# Patient Record
Sex: MALE | Race: WHITE | Employment: OTHER | URBAN - METROPOLITAN AREA
[De-identification: names, ages, dates, MRNs, and addresses within clinical notes are randomized per-mention and may not be internally consistent; named-entity substitution may affect disease eponyms.]

---

## 2017-07-24 ENCOUNTER — TELEPHONE (OUTPATIENT)
Dept: INTERNAL MEDICINE CLINIC | Facility: CLINIC | Age: 67
End: 2017-07-24

## 2017-10-03 ENCOUNTER — TELEPHONE (OUTPATIENT)
Dept: INTERNAL MEDICINE CLINIC | Facility: CLINIC | Age: 67
End: 2017-10-03

## 2017-11-16 ENCOUNTER — NURSE ONLY (OUTPATIENT)
Dept: INTERNAL MEDICINE CLINIC | Facility: CLINIC | Age: 67
End: 2017-11-16

## 2017-11-16 DIAGNOSIS — Z00.00 LABORATORY EXAMINATION ORDERED AS PART OF A ROUTINE GENERAL MEDICAL EXAMINATION: Primary | ICD-10-CM

## 2017-11-16 PROCEDURE — 99173 VISUAL ACUITY SCREEN: CPT | Performed by: INTERNAL MEDICINE

## 2017-11-16 PROCEDURE — 92551 PURE TONE HEARING TEST AIR: CPT | Performed by: INTERNAL MEDICINE

## 2017-11-16 PROCEDURE — 81001 URINALYSIS AUTO W/SCOPE: CPT | Performed by: INTERNAL MEDICINE

## 2017-11-16 PROCEDURE — 93000 ELECTROCARDIOGRAM COMPLETE: CPT | Performed by: INTERNAL MEDICINE

## 2017-11-16 PROCEDURE — 94010 BREATHING CAPACITY TEST: CPT | Performed by: INTERNAL MEDICINE

## 2017-11-16 NOTE — PROGRESS NOTES
*LOOKIN' BODY RESULTS    YES: X      NO:       REASON TEST NOT PERFORMED:        HEIGHT: 5'10   WEIGHT: 237  _____________________________________________________________________________  *VENIPUNCTURE    YES:  X     NO:        REASON VENIPUNCTURE NOT PERF

## 2017-11-27 ENCOUNTER — MED REC SCAN ONLY (OUTPATIENT)
Dept: INTERNAL MEDICINE CLINIC | Facility: CLINIC | Age: 67
End: 2017-11-27

## 2017-11-29 NOTE — PROGRESS NOTES
HEALTH MAINTENANCE:        Immunization History  Administered            Date(s) Administered    DTAP                  12/15/2014      Influenza             09/17/2010      Influenza Vaccine, High Dose, Preserv Free                          11/06/2017 - 1000 Hz: Pass Right Ear @ 40 dBHL - 1000 Hz: Pass   Left Ear @ 40dBHL - 2000 Hz: Pass Right Ear @ 40 dBHL - 2000 Hz: Pass   Left Ear @ 40dBHL - 4000 Hz: Pass Right Ear @ 40 dBHL - 4000 Hz: Pass

## 2017-11-30 ENCOUNTER — OFFICE VISIT (OUTPATIENT)
Dept: INTERNAL MEDICINE CLINIC | Facility: CLINIC | Age: 67
End: 2017-11-30

## 2017-11-30 ENCOUNTER — LAB ENCOUNTER (OUTPATIENT)
Dept: LAB | Age: 67
End: 2017-11-30
Attending: INTERNAL MEDICINE
Payer: MEDICARE

## 2017-11-30 VITALS
RESPIRATION RATE: 12 BRPM | OXYGEN SATURATION: 98 % | WEIGHT: 237 LBS | HEIGHT: 70 IN | TEMPERATURE: 98 F | SYSTOLIC BLOOD PRESSURE: 118 MMHG | HEART RATE: 72 BPM | DIASTOLIC BLOOD PRESSURE: 70 MMHG | BODY MASS INDEX: 33.93 KG/M2

## 2017-11-30 DIAGNOSIS — F98.8 ATTENTION DEFICIT DISORDER, UNSPECIFIED HYPERACTIVITY PRESENCE: ICD-10-CM

## 2017-11-30 DIAGNOSIS — E88.81 INSULIN RESISTANCE: ICD-10-CM

## 2017-11-30 DIAGNOSIS — R93.1 AGATSTON CAC SCORE, <100: ICD-10-CM

## 2017-11-30 DIAGNOSIS — I65.23 ATHEROSCLEROSIS OF BOTH CAROTID ARTERIES: ICD-10-CM

## 2017-11-30 DIAGNOSIS — R82.90 ABNORMAL FINDING ON URINALYSIS: ICD-10-CM

## 2017-11-30 DIAGNOSIS — Z91.09 ENVIRONMENTAL ALLERGIES: ICD-10-CM

## 2017-11-30 DIAGNOSIS — L57.0 ACTINIC KERATOSIS: ICD-10-CM

## 2017-11-30 DIAGNOSIS — N20.0 KIDNEY STONE: ICD-10-CM

## 2017-11-30 DIAGNOSIS — I87.2 VENOUS INSUFFICIENCY: ICD-10-CM

## 2017-11-30 DIAGNOSIS — R31.29 MICROSCOPIC HEMATURIA: ICD-10-CM

## 2017-11-30 DIAGNOSIS — Z00.00 ROUTINE GENERAL MEDICAL EXAMINATION AT A HEALTH CARE FACILITY: ICD-10-CM

## 2017-11-30 DIAGNOSIS — R39.12 BENIGN PROSTATIC HYPERPLASIA WITH WEAK URINARY STREAM: ICD-10-CM

## 2017-11-30 DIAGNOSIS — E78.5 ELEVATED LIPIDS: Primary | ICD-10-CM

## 2017-11-30 DIAGNOSIS — Z83.3 FH: DIABETES MELLITUS: ICD-10-CM

## 2017-11-30 DIAGNOSIS — N40.1 BENIGN PROSTATIC HYPERPLASIA WITH WEAK URINARY STREAM: ICD-10-CM

## 2017-11-30 PROCEDURE — 87086 URINE CULTURE/COLONY COUNT: CPT | Performed by: INTERNAL MEDICINE

## 2017-11-30 PROCEDURE — 88108 CYTOPATH CONCENTRATE TECH: CPT

## 2017-11-30 PROCEDURE — G0402 INITIAL PREVENTIVE EXAM: HCPCS | Performed by: INTERNAL MEDICINE

## 2017-11-30 RX ORDER — SERTRALINE HYDROCHLORIDE 100 MG/1
100 TABLET, FILM COATED ORAL DAILY
COMMUNITY
End: 2017-11-30

## 2017-11-30 RX ORDER — ZOLPIDEM TARTRATE 10 MG/1
10 TABLET ORAL NIGHTLY PRN
COMMUNITY
End: 2017-11-30

## 2017-11-30 RX ORDER — ROSUVASTATIN CALCIUM 20 MG/1
20 TABLET, COATED ORAL NIGHTLY
Qty: 30 TABLET | Refills: 6 | Status: SHIPPED | OUTPATIENT
Start: 2017-11-30 | End: 2019-02-07

## 2017-11-30 RX ORDER — ROSUVASTATIN CALCIUM 20 MG/1
20 TABLET, COATED ORAL NIGHTLY
Qty: 90 TABLET | Refills: 3 | Status: SHIPPED | OUTPATIENT
Start: 2017-11-30 | End: 2018-11-14

## 2017-11-30 NOTE — PROGRESS NOTES
HPI:    Patient ID: Josette Bishop is a 79year old male. HPI DEAR Boo    IT WAS NICE SEEING YOU FOR YOUR WELLNESS VISIT ON 11/30/2017            Review of Systems   HPI:    Patient ID: Josette Bishop is a 79year old male.     History of Present Illn Have you had any memory issues?: 0-No    Fall/Risk Scorin          PHQ-4: Over the LAST 2 WEEKS               Little interest or pleasure in doing things (over the last two weeks)?: Not at all    Feeling down, depressed, or hopeless (over the last microhematuria   Negative for dysuria and hematuria. Urinary stream normal.  Denies history of kidney stones. Nocturia times:x    ED BPH symptoms of some lower extreme some nocturia. History of kidney stones. 2 cystoscopies for micral hematuria.   Remain score, <100     Cervical spine arthritis (HCC)     Insulin resistance      Past Medical History:  Past Medical History:   Diagnosis Date   • Actinic keratosis 9/17/2010   • BPH (benign prostatic hypertrophy) 9/17/2010   • Cataract    • Chickenpox    • CRP Smokeless tobacco: Never Used    Alcohol use Yes    Comment: rarely    Drug use: No    Sexual activity: Not on file     Other Topics Concern    Caffeine Concern No    Exercise Yes     Social History Narrative   None on file       Health Maintenance: let me know if there are any changes. ASSESSMENT/PLAN:       1. Wellness visit completed. Long discussion on inflammation. Similar to sunburn. Red warm swollen with tissue damage. Only without pain. Triggering factors #1 age.   Other trigge reduce risk by reducing inflammation.   I have elected to put her on Crestor 20 mg occasionally you do forget the evening dose of Lipitor your LDL is 115 Crestor you really cannot take it any time during the day improve compliance and we can check that in s Cap Take 1 capsule (0.5 mg total) by mouth nightly. Disp: 90 capsule Rfl: 3   Oseltamivir Phosphate 75 MG Oral Cap Take 1 capsule (75 mg total) by mouth 2 (two) times daily.  Disp: 10 capsule Rfl: 1   Atorvastatin Calcium 20 MG Oral Tab Take 1 tablet (20 mg

## 2017-12-07 ENCOUNTER — HOSPITAL ENCOUNTER (OUTPATIENT)
Dept: CT IMAGING | Facility: HOSPITAL | Age: 67
Discharge: HOME | End: 2017-12-07
Attending: INTERNAL MEDICINE
Payer: MEDICARE

## 2017-12-07 DIAGNOSIS — R31.29 MICROHEMATURIA: ICD-10-CM

## 2017-12-07 PROCEDURE — 74178 CT ABD&PLV WO CNTR FLWD CNTR: CPT | Performed by: INTERNAL MEDICINE

## 2017-12-07 PROCEDURE — 82565 ASSAY OF CREATININE: CPT

## 2017-12-11 DIAGNOSIS — G47.33 OSA (OBSTRUCTIVE SLEEP APNEA): Primary | ICD-10-CM

## 2018-03-03 ENCOUNTER — LAB ENCOUNTER (OUTPATIENT)
Dept: LAB | Facility: HOSPITAL | Age: 68
End: 2018-03-03
Attending: INTERNAL MEDICINE
Payer: MEDICARE

## 2018-03-03 DIAGNOSIS — E78.5 ELEVATED LIPIDS: ICD-10-CM

## 2018-03-03 LAB
ALBUMIN SERPL-MCNC: 3.9 G/DL (ref 3.5–4.8)
ALP LIVER SERPL-CCNC: 65 U/L (ref 45–117)
ALT SERPL-CCNC: 41 U/L (ref 17–63)
AST SERPL-CCNC: 24 U/L (ref 15–41)
BILIRUB DIRECT SERPL-MCNC: 0.1 MG/DL (ref 0.1–0.5)
BILIRUB SERPL-MCNC: 0.4 MG/DL (ref 0.1–2)
M PROTEIN MFR SERPL ELPH: 7.5 G/DL (ref 6.1–8.3)

## 2018-03-03 PROCEDURE — 36415 COLL VENOUS BLD VENIPUNCTURE: CPT

## 2018-03-03 PROCEDURE — 80076 HEPATIC FUNCTION PANEL: CPT

## 2018-03-07 ENCOUNTER — PATIENT MESSAGE (OUTPATIENT)
Dept: INTERNAL MEDICINE CLINIC | Facility: CLINIC | Age: 68
End: 2018-03-07

## 2018-03-07 NOTE — TELEPHONE ENCOUNTER
From: Jorge Ortiz Dr.  To: Ary Dailey MD  Sent: 3/7/2018 11:17 AM CST  Subject: Test Results Question    I have a question about HEPATIC FUNCTION PANEL (7) resulted on 3/3/18, 10:46 AM.  I believe a lipid panel was also performed.  Are tho

## 2018-03-10 ENCOUNTER — LAB ENCOUNTER (OUTPATIENT)
Dept: LAB | Facility: HOSPITAL | Age: 68
End: 2018-03-10
Attending: INTERNAL MEDICINE
Payer: MEDICARE

## 2018-03-10 DIAGNOSIS — E78.5 HYPERLIPIDEMIA: Primary | ICD-10-CM

## 2018-03-10 LAB
CHOLEST SMN-MCNC: 141 MG/DL (ref ?–200)
HDLC SERPL-MCNC: 55 MG/DL (ref 45–?)
HDLC SERPL: 2.56 {RATIO} (ref ?–4.97)
LDLC SERPL CALC-MCNC: 73 MG/DL (ref ?–130)
NONHDLC SERPL-MCNC: 86 MG/DL (ref ?–130)
TRIGL SERPL-MCNC: 66 MG/DL (ref ?–150)
VLDLC SERPL CALC-MCNC: 13 MG/DL (ref 5–40)

## 2018-03-10 PROCEDURE — 80061 LIPID PANEL: CPT

## 2018-03-10 PROCEDURE — 36415 COLL VENOUS BLD VENIPUNCTURE: CPT

## 2018-03-10 NOTE — PROGRESS NOTES
Congratulations. Triglycerides remarkably better. Continue up good health habits.   Cholesterol 141 excellent LDL 73 excellent HDL excellent

## 2018-06-19 ENCOUNTER — OFFICE VISIT (OUTPATIENT)
Dept: SLEEP CENTER | Facility: HOSPITAL | Age: 68
End: 2018-06-19
Attending: INTERNAL MEDICINE
Payer: MEDICARE

## 2018-06-19 DIAGNOSIS — R06.83 SNORING: ICD-10-CM

## 2018-06-19 PROCEDURE — 95810 POLYSOM 6/> YRS 4/> PARAM: CPT

## 2018-06-26 NOTE — PROCEDURES
[address] [address],[facility] 100       Accredited by the [facility] of Sleep Medicine (AASM)    PATIENT'S NAME:        Josr Kirk PHYSICIAN:   Tamir Blanc M.D.   REFERRING PHYSICIAN:   Denton Madrigal, was 75.5 minutes. During sleep, all stages of sleep were seen. Slow wave sleep comprised 25% of total sleep time. REM sleep occupied 12.6% of total sleep time with a REM latency of 182.1 minutes. Sleep architecture was fragmented at times.     Sleep-dis further evaluation for restless leg syndrome. Thank you for your confidence in the [facility]. If you have any questions, please feel free to contact us at [phone].     Dictated By Enma Lopez M.D.  d: 06/26/2018 09:39:01  t: 06/26/2018

## 2018-07-16 ENCOUNTER — MED REC SCAN ONLY (OUTPATIENT)
Dept: INTERNAL MEDICINE CLINIC | Facility: CLINIC | Age: 68
End: 2018-07-16

## 2018-07-17 ENCOUNTER — OFFICE VISIT (OUTPATIENT)
Dept: SLEEP CENTER | Facility: HOSPITAL | Age: 68
End: 2018-07-17
Attending: INTERNAL MEDICINE
Payer: MEDICARE

## 2018-07-17 DIAGNOSIS — G47.33 OBSTRUCTIVE SLEEP APNEA (ADULT) (PEDIATRIC): ICD-10-CM

## 2018-07-17 PROCEDURE — 95811 POLYSOM 6/>YRS CPAP 4/> PARM: CPT

## 2018-07-25 NOTE — PROCEDURES
[address] [address],[facility] 100       Accredited by the [facility] of Sleep Medicine (AASM)    PATIENT'S NAME:        Ayse Burnham PHYSICIAN:   James Best M.D.   REFERRING PHYSICIAN:   Dr. Damien Shelley minutes, wake after sleep onset was 43 minutes. During sleep, all stages of sleep were seen. Slow-wave sleep comprised 4.7% of total sleep time. REM sleep occupied 9.2% of total sleep time with a REM latency of 282.5 minutes.   There were a few awakening 55:55:63  t: 07/24/2018 19:35:07  [facility] [id number]  NS/    cc: Dr. Veronica Quezada

## 2018-07-30 ENCOUNTER — OFFICE VISIT (OUTPATIENT)
Dept: SLEEP CENTER | Facility: HOSPITAL | Age: 68
End: 2018-07-30
Attending: INTERNAL MEDICINE
Payer: MEDICARE

## 2018-07-30 DIAGNOSIS — G47.33 OBSTRUCTIVE SLEEP APNEA (ADULT) (PEDIATRIC): ICD-10-CM

## 2018-07-30 PROCEDURE — 95806 SLEEP STUDY UNATT&RESP EFFT: CPT

## 2018-08-01 NOTE — PROCEDURES
[address] [address],[facility] 100       Accredited by the [facility] of Sleep Medicine (AASM)    PATIENT'S NAME:        Timothy Colbert PHYSICIAN:   Kim Smith M.D.   REFERRING PHYSICIAN:   Dr. April Umana desaturations, with an SaO2 padma of 82%. The patient spent about 11 minutes, which was 3% of recording time, with oxygen saturation levels of 88% or below. Snoring was noted. Heart rate averaged 54.     IMPRESSION:  This home sleep test documents modera

## 2018-08-07 ENCOUNTER — MED REC SCAN ONLY (OUTPATIENT)
Dept: INTERNAL MEDICINE CLINIC | Facility: CLINIC | Age: 68
End: 2018-08-07

## 2018-08-20 ENCOUNTER — TELEPHONE (OUTPATIENT)
Dept: INTERNAL MEDICINE CLINIC | Facility: CLINIC | Age: 68
End: 2018-08-20

## 2018-11-04 ENCOUNTER — HOSPITAL ENCOUNTER (OUTPATIENT)
Dept: GENERAL RADIOLOGY | Facility: HOSPITAL | Age: 68
Discharge: HOME | DRG: 868 | End: 2018-11-04
Attending: INTERNAL MEDICINE
Payer: MEDICARE

## 2018-11-04 ENCOUNTER — TELEPHONE (OUTPATIENT)
Dept: INTERNAL MEDICINE CLINIC | Facility: CLINIC | Age: 68
End: 2018-11-04

## 2018-11-04 ENCOUNTER — APPOINTMENT (OUTPATIENT)
Dept: CT IMAGING | Facility: HOSPITAL | Age: 68
DRG: 868 | End: 2018-11-04
Attending: EMERGENCY MEDICINE
Payer: MEDICARE

## 2018-11-04 ENCOUNTER — LAB ENCOUNTER (OUTPATIENT)
Dept: LAB | Facility: HOSPITAL | Age: 68
DRG: 868 | End: 2018-11-04
Attending: INTERNAL MEDICINE
Payer: MEDICARE

## 2018-11-04 ENCOUNTER — HOSPITAL ENCOUNTER (INPATIENT)
Facility: HOSPITAL | Age: 68
LOS: 4 days | Discharge: HOME | DRG: 868 | End: 2018-11-08
Attending: EMERGENCY MEDICINE | Admitting: INTERNAL MEDICINE
Payer: MEDICARE

## 2018-11-04 DIAGNOSIS — R50.81 FEVER IN OTHER DISEASES: Primary | ICD-10-CM

## 2018-11-04 DIAGNOSIS — D69.6 THROMBOCYTOPENIA (HCC): ICD-10-CM

## 2018-11-04 DIAGNOSIS — R50.81 FEVER IN OTHER DISEASES: ICD-10-CM

## 2018-11-04 DIAGNOSIS — R50.9 FEVER, UNSPECIFIED FEVER CAUSE: Primary | ICD-10-CM

## 2018-11-04 PROBLEM — B54: Status: ACTIVE | Noted: 2018-11-04

## 2018-11-04 PROBLEM — D64.9 ANEMIA: Status: ACTIVE | Noted: 2018-11-04

## 2018-11-04 PROBLEM — R73.9 HYPERGLYCEMIA: Status: ACTIVE | Noted: 2018-11-04

## 2018-11-04 PROBLEM — R73.9 HYPERGLYCEMIA: Status: RESOLVED | Noted: 2018-11-04 | Resolved: 2018-11-04

## 2018-11-04 PROBLEM — D50.0 BLOOD LOSS ANEMIA: Status: ACTIVE | Noted: 2018-11-04

## 2018-11-04 PROBLEM — R31.9 HEMATURIA: Status: ACTIVE | Noted: 2018-11-04

## 2018-11-04 PROCEDURE — 81001 URINALYSIS AUTO W/SCOPE: CPT

## 2018-11-04 PROCEDURE — 71046 X-RAY EXAM CHEST 2 VIEWS: CPT | Performed by: INTERNAL MEDICINE

## 2018-11-04 PROCEDURE — 80053 COMPREHEN METABOLIC PANEL: CPT

## 2018-11-04 PROCEDURE — 87207 SMEAR SPECIAL STAIN: CPT | Performed by: INTERNAL MEDICINE

## 2018-11-04 PROCEDURE — 71275 CT ANGIOGRAPHY CHEST: CPT | Performed by: EMERGENCY MEDICINE

## 2018-11-04 PROCEDURE — 87086 URINE CULTURE/COLONY COUNT: CPT

## 2018-11-04 PROCEDURE — 74178 CT ABD&PLV WO CNTR FLWD CNTR: CPT | Performed by: EMERGENCY MEDICINE

## 2018-11-04 PROCEDURE — 36415 COLL VENOUS BLD VENIPUNCTURE: CPT

## 2018-11-04 PROCEDURE — 85025 COMPLETE CBC W/AUTO DIFF WBC: CPT

## 2018-11-04 PROCEDURE — 99223 1ST HOSP IP/OBS HIGH 75: CPT | Performed by: INTERNAL MEDICINE

## 2018-11-04 RX ORDER — ACETAMINOPHEN 500 MG
1000 TABLET ORAL ONCE
Status: COMPLETED | OUTPATIENT
Start: 2018-11-04 | End: 2018-11-04

## 2018-11-04 RX ORDER — ATOVAQUONE 750 MG/5ML
750 SUSPENSION ORAL 2 TIMES DAILY WITH MEALS
Status: DISCONTINUED | OUTPATIENT
Start: 2018-11-05 | End: 2018-11-04

## 2018-11-04 RX ORDER — FINASTERIDE 5 MG/1
5 TABLET, FILM COATED ORAL DAILY
Status: DISCONTINUED | OUTPATIENT
Start: 2018-11-04 | End: 2018-11-08

## 2018-11-04 RX ORDER — ALBUTEROL SULFATE 2.5 MG/3ML
2.5 SOLUTION RESPIRATORY (INHALATION) EVERY 4 HOURS PRN
Status: DISCONTINUED | OUTPATIENT
Start: 2018-11-04 | End: 2018-11-08

## 2018-11-04 RX ORDER — AZITHROMYCIN 250 MG/1
500 TABLET, FILM COATED ORAL
Status: DISCONTINUED | OUTPATIENT
Start: 2018-11-05 | End: 2018-11-04

## 2018-11-04 RX ORDER — SODIUM CHLORIDE 9 MG/ML
INJECTION, SOLUTION INTRAVENOUS CONTINUOUS
Status: DISCONTINUED | OUTPATIENT
Start: 2018-11-04 | End: 2018-11-07

## 2018-11-04 RX ORDER — DOXYCYCLINE HYCLATE 100 MG/1
100 CAPSULE ORAL 2 TIMES DAILY
Status: DISCONTINUED | OUTPATIENT
Start: 2018-11-05 | End: 2018-11-05

## 2018-11-04 RX ORDER — ACETAMINOPHEN 500 MG
500 TABLET ORAL EVERY 6 HOURS PRN
Status: DISCONTINUED | OUTPATIENT
Start: 2018-11-04 | End: 2018-11-05

## 2018-11-04 RX ORDER — ONDANSETRON 2 MG/ML
4 INJECTION INTRAMUSCULAR; INTRAVENOUS EVERY 4 HOURS PRN
Status: DISCONTINUED | OUTPATIENT
Start: 2018-11-04 | End: 2018-11-08

## 2018-11-04 RX ORDER — DOXYCYCLINE HYCLATE 100 MG/1
100 CAPSULE ORAL ONCE
Status: COMPLETED | OUTPATIENT
Start: 2018-11-04 | End: 2018-11-04

## 2018-11-05 PROBLEM — G47.33 OSA (OBSTRUCTIVE SLEEP APNEA): Status: ACTIVE | Noted: 2018-11-05

## 2018-11-05 PROCEDURE — 99233 SBSQ HOSP IP/OBS HIGH 50: CPT | Performed by: INTERNAL MEDICINE

## 2018-11-05 RX ORDER — MEFLOQUINE HYDROCHLORIDE 250 MG/1
750 TABLET ORAL ONCE
Status: COMPLETED | OUTPATIENT
Start: 2018-11-05 | End: 2018-11-05

## 2018-11-05 RX ORDER — CODEINE PHOSPHATE AND GUAIFENESIN 10; 100 MG/5ML; MG/5ML
10 SOLUTION ORAL EVERY 4 HOURS PRN
Status: DISCONTINUED | OUTPATIENT
Start: 2018-11-05 | End: 2018-11-08

## 2018-11-05 RX ORDER — ALFUZOSIN HYDROCHLORIDE 10 MG/1
10 TABLET, EXTENDED RELEASE ORAL DAILY
Status: DISCONTINUED | OUTPATIENT
Start: 2018-11-05 | End: 2018-11-08

## 2018-11-05 RX ORDER — ATOVAQUONE 750 MG/5ML
750 SUSPENSION ORAL 2 TIMES DAILY WITH MEALS
Status: DISCONTINUED | OUTPATIENT
Start: 2018-11-05 | End: 2018-11-08

## 2018-11-05 RX ORDER — ACETAMINOPHEN 500 MG
500 TABLET ORAL EVERY 4 HOURS PRN
Status: DISCONTINUED | OUTPATIENT
Start: 2018-11-05 | End: 2018-11-08

## 2018-11-05 RX ORDER — CLINDAMYCIN PHOSPHATE 900 MG/50ML
900 INJECTION INTRAVENOUS EVERY 8 HOURS
Status: DISCONTINUED | OUTPATIENT
Start: 2018-11-05 | End: 2018-11-05

## 2018-11-05 RX ORDER — MEFLOQUINE HYDROCHLORIDE 250 MG/1
500 TABLET ORAL ONCE
Status: COMPLETED | OUTPATIENT
Start: 2018-11-05 | End: 2018-11-05

## 2018-11-05 NOTE — CONSULTS
INFECTIOUS DISEASE CONSULTATION    [address] Patient Status:  Inpatient    1/3/1950 MRN [id number]   [facility] 3NE-A Attending Sampson Owusu MD   Hosp Day # 1 PCP Stephie Jennings mild   • Varicose veins 9/17/2010    and venous insufficiency   • Wrist fracture, left 1997     Past Surgical History:   Procedure Laterality Date   • FOREARM/WRIST SURGERY UNLISTED  1997    L wrist fx, ORIF   • OTHER SURGICAL HISTORY  12/26/2017    cysto chloride 0.9 % 100 mL MBP/ADD-vantage, 2 g, Intravenous, Q24H  •  Doxycycline Hyclate (VIBRAMYCIN) cap 100 mg, 100 mg, Oral, BID    No current facility-administered medications on file prior to encounter.    Current Outpatient Medications on File Prior to E Regular rate and rhythm. No murmurs. Abdomen: Soft, nontender, nondistended. Positive bowel sounds. Musculoskeletal: Full range of motion of all extremities. No swelling noted. Joints: no effusions  Skin: No lesions.  No erythema, no open wounds [facility]  [phone]

## 2018-11-05 NOTE — PROGRESS NOTES
[facility]  Progress Note    [address]rig[address] Dr. Patient Status:  Inpatient    1/3/1950 MRN [id number]   [facility] 3NE-A Attending Kimberlee Erickson MD   Hosp Day # 1 PCP Boris Ontiveros MD       Assessment and Plan:  Angelita Ansari patient was compliant with Malarone.     Spent some time in [place] recently but actually had illness before he went [place]    SUBJECTIVE:  Constitutional:  Denies fever, pain, nausea and vomiting or shaking and chills BUN  22*   --    NA  136   --    K  4.0   --    CL  103   --    GLU  103*   --    CO2  25.0   --    CA  8.6   --    ALB  3.0*   --    ALKPHO  70   --    BILT  1.0   --    TP  7.3   --    AST  30   --    ALT  46   --    B12   --   454       Imaging:  Xr C thickening of the mid pelvic ureter seen on series 4, image 148 and 149 which appears unchanged compared to the study of 2017. LIVER:  No enlargement, atrophy, abnormal density, or significant focal lesion.   BILIARY:  No visible dilatation or calcification the ureters bilaterally right greater than left unchanged . There is some stable minimal wall thickening of the mid pelvic ureter seen on series 4, image 148 and 149. This of uncertain significance.   Stable moderate enlargement of the prostate with stable

## 2018-11-05 NOTE — H&P
History and Physical    [address] Patient Status:  Emergency    1/3/1950 MRN [id number]   Location [address] [facility] Attending Alex Lopes MD   Hosp Day # 0 PCP Jannie Lopez MD         Assessment and Plan: no kidney stones were noted although there had been a possibility of kidney stones in the past.  Urinalysis was sent for culture. History of Present Illness:  Haseeb Jane Dr. 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   3 weeks ago developed fever of veins 9/17/2010    and venous insufficiency   • Wrist fracture, left 1997     Past Surgical History:   Procedure Laterality Date   • FOREARM/WRIST SURGERY UNLISTED  1997    L wrist fx, ORIF   • OTHER SURGICAL HISTORY  12/26/2017    zheng coffman/ Dr. Mooney Stage symptoms, nausea, vomiting, change in bowel habits. No  diarrhea, constipation and abdominal pain,  Melena  Or  Rectal  Bleeding        please see above. Integument/breast: Negative for rash, skin lesions, and pruritus.   Hematologic/lymphatic: Chuck Chester WBC  4.3   HGB  11.5*   PLT  57.0*       Chem:  Recent Labs   Lab  11/04/18   1630   NA  136   K  4.0   CL  103   CO2  25.0   BUN  22*   CREATSERUM  1.21   CA  8.6   GLU  103*       Recent Labs   Lab  11/04/18   1630   ALT  46   AST  30   ALB  3.0*

## 2018-11-05 NOTE — PROGRESS NOTES
[facility] Pharmacy Note: Antimicrobial Weight Dose Adjustment for: ceftriaxone (Real Plume)    Jimmy Demarco Dr. 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   CrCl is estimated creatinine clearance is 60.3 mL/min (based

## 2018-11-05 NOTE — ED INITIAL ASSESSMENT (HPI)
Patient arrives for abnormal labs and fever. Patient had blood work completed today, platelets of 57, malaria smear positive. Traveled out of the country over one year ago. Fever of 103 at home.

## 2018-11-05 NOTE — ED PROVIDER NOTES
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 Neuro: No facial droop. No dysarthria or a aphasia. Patient moves all extremities. He is awake and alert and answers questions appropriately.     ED Course provider specified.       Medications Prescribed:  Current Discharge Medication List        Present on Admission  Date Reviewed: 11/30/2017          ICD-10-CM Noted POA    Anemia D64.9 11/4/2018 Yes    Fever R50.9 11/4/2018 Unknown    Hyperglycemia R73.9 11

## 2018-11-05 NOTE — PROGRESS NOTES
Received pt from ER via transport and spouse  Pt A&Ox4 calm and cooperative  VS WNL, RA  Pt brought home CPAP machine however does not have some of the supplies needed.  Reviewed with pt RT can give him CPAP for the night however he states he will go withou

## 2018-11-05 NOTE — PLAN OF CARE
Patient being treated for malaria vs. Babsesiosis. ID on consult. Blood sent to check for infection. Anti-infectives given as ordered. Low grade fever today, tylenol given.  Patient was diaphoretic during low grade fever and states he \"just doesn't feel w

## 2018-11-06 PROCEDURE — 99233 SBSQ HOSP IP/OBS HIGH 50: CPT | Performed by: INTERNAL MEDICINE

## 2018-11-06 RX ORDER — AZITHROMYCIN 500 MG/1
500 TABLET, FILM COATED ORAL DAILY
Qty: 7 TABLET | Refills: 0 | Status: SHIPPED | OUTPATIENT
Start: 2018-11-06 | End: 2018-11-13

## 2018-11-06 RX ORDER — AZITHROMYCIN 250 MG/1
500 TABLET, FILM COATED ORAL DAILY
Status: DISCONTINUED | OUTPATIENT
Start: 2018-11-07 | End: 2018-11-08

## 2018-11-06 RX ORDER — ATOVAQUONE 750 MG/5ML
750 SUSPENSION ORAL 2 TIMES DAILY WITH MEALS
Qty: 100 ML | Refills: 0 | Status: SHIPPED | OUTPATIENT
Start: 2018-11-06 | End: 2018-11-07

## 2018-11-06 NOTE — PROGRESS NOTES
[facility]  Progress Note    [address] Patient Status:  Inpatient    1/3/1950 MRN [id number]   [facility] 3NE-A Attending Manas Leno MD   Hosp Day # 2 PCP Collette [place], MD       Assessment and Plan:  Santino Reinoso malaria can cause cough with release of parasites. CC: Better sense of well-being today    SUBJECTIVE:  Constitutional:  Denies fever, pain, nausea and vomiting or shaking and chills    HEENT:  No visual or hearing complaints.  No dysphasia and no epistaxi PLT  57.0*  56.0*  56.0*     Recent Labs   Lab  11/04/18   1630  11/04/18   1852  11/05/18   0614  11/05/18   1817  11/05/18   1818  11/06/18   0713   BUN  22*   --    --   18   --   16   NA  136   --    --   138   --   140   K  4.0   --    --   3.8   -- reduction techniques were used. Dose information is transmitted to the ACR [employer] of Radiology) NRDR ([address]) which includes the Dose Index Registry.   3-D RENDERING:  Additional 3-D rendering was generated by the techno 2.1 cm sclerotic focus right iliac bone. No fracture. Marked degenerative disc disease L4-5 and L5-S1. There is vacuum phenomena posterior to the superior aspect of the L5 vertebral body unchanged.  LUNG BASES:  Mild calcification of the aortic valve quin DVT Prophylaxis SCD ambulation    Estimated date of discharge:   TBD    At this point Mr. Laila Carson is expected to be discharge to: home    Questions/concerns were discussed with patient and/or family by bedside.       Geovanny Tran MD  11/6/2018

## 2018-11-06 NOTE — PROGRESS NOTES
[facility]  Progress Note    [address] Patient Status:  Inpatient    1/3/1950 MRN [id number]   [facility] 3NE-A Attending Nell Mckeon MD   Hosp Day # 2 PCP Geovanny Tran MD     Chief complaint: Low grade temp Malaria awaiting PCR confirmation on Rx  Anemia/Thrombocytopenia await labs today.     Denton Chaves MD  11/6/2018  7:27 AM

## 2018-11-06 NOTE — PROGRESS NOTES
[address] [facility] DISEASE PROGRESS NOTE    [address] Patient Status:  Inpatient    1/3/1950 MRN [id number]   [facility] 3NE-A Attending Brien Pope MD   [address] Day # 2 PCP Jayda Rico Culture Result No Growth 1 Day N/A             Problem list reviewed:  Patient Active Problem List:     Actinic keratosis     Kidney stone     Venous insufficiency     Rosacea     BPH with obstruction/lower urinary tract symptoms     BMI 34.0-34.9,adult

## 2018-11-06 NOTE — CONSULTS
[facility]    PATIENT'S NAME: Molly Diaz   ATTENDING PHYSICIAN: Diogo White M.D.   CONSULTING PHYSICIAN: Cici Simental M.D.    PATIENT ACCOUNT#:   [de-identified]    LOCATION:  [address] [place] [address]  MEDICAL RECORD #:   [id number] palpitations. RESPIRATORY:  Complains of nonproductive cough. No hemoptysis, wheezing, etc.  GASTROINTESTINAL:  Appetite good. No nausea, vomiting, diarrhea. GENITOURINARY:  As mentioned, hematuria.   The patient has had several episodes of the same, th sent out for PCR for babesiosis, etc.    IMPRESSION:    1. Fever. 2.   Normocytic anemia/thrombocytopenia. 3.   Possible positive smears for malarial parasites on peripheral smear evaluation of RBCs.     PLAN:  The patient presents with gross hematuria

## 2018-11-07 PROCEDURE — 99233 SBSQ HOSP IP/OBS HIGH 50: CPT | Performed by: INTERNAL MEDICINE

## 2018-11-07 RX ORDER — AZITHROMYCIN 500 MG/1
500 TABLET, FILM COATED ORAL DAILY
Qty: 7 TABLET | Refills: 0 | Status: SHIPPED | OUTPATIENT
Start: 2018-11-07 | End: 2018-11-14

## 2018-11-07 RX ORDER — ATOVAQUONE 750 MG/5ML
750 SUSPENSION ORAL 2 TIMES DAILY WITH MEALS
Qty: 100 ML | Refills: 0 | Status: SHIPPED | OUTPATIENT
Start: 2018-11-07 | End: 2018-11-17

## 2018-11-07 NOTE — PLAN OF CARE
GENITOURINARY - ADULT    • Absence of urinary retention Progressing        RESPIRATORY - ADULT    • Achieves optimal ventilation and oxygenation Progressing        PT A/O, 95% ON RA, CPAP AT NIGHT, SR, TEMP 100.5, GIVEN TYLENOL, NONPRODUCTIVE COUGH, GIVEN

## 2018-11-07 NOTE — PROGRESS NOTES
[facility]  Progress Note    [address] Dr[address] Patient Status:  Inpatient    1/3/1950 MRN [id number]   [facility] 3NE-A Attending Josephine Winston MD   Hosp Day # 3 PCP Isabel Glez MD     Chief complaint: Low grade fever Cervical spine arthritis     Insulin resistance     Thrombocytopenia (HCC)     Blood loss anemia     Peripheral blood smear positive for malaria     Hematuria     Fever     Fever, unspecified fever cause     ADOLPH (obstructive sleep apnea)     Splenomegaly

## 2018-11-07 NOTE — PROGRESS NOTES
[address] [facility] DISEASE PROGRESS NOTE    Jihan [address]er Dr. Patient Status:  Inpatient    1/3/1950 MRN [id number]   [facility] 3NE-A Attending Brien Pope MD   [facility] Day # 3 PCP Dana Rico Growth 2 Days N/A             Problem list reviewed:  Patient Active Problem List:     Actinic keratosis     Kidney stone     Venous insufficiency     Rosacea     BPH with obstruction/lower urinary tract symptoms     BMI 34.0-34.9,adult     Microscopic hem

## 2018-11-07 NOTE — PROGRESS NOTES
[facility]  Progress Note    Souleymane Gupta Dr. Patient Status:  Inpatient    1/3/1950 MRN [id number]   [facility] 3NE-A Attending Mecca Singer MD   Hosp Day # 3 PCP Godwin Olvera MD       Assessment and Plan:  Rebeca Reid hematuria    Neuro:  No headaches, no confusion. no  Falls  Oriented      MS:  Denies joint  Pain     Skin no breakdown, no phlebitis or cellutlitis    Vascular  -  No  Calf  Swelling      Psych: appropriate        Intake/Output:    Intake/Output Summary (L --   23.0  24.0   CA   --    --   7.8*   --   8.1*  8.0*   ALB   --    --   2.7*   --   2.5*  2.3*   ALKPHO   --    --   62   --   64  66   BILT   --    --   0.7   --   0.8  0.6   TP   --    --   6.5   --   6.4  6.0*   AST   --    --   39   --   39  47* CONTRAST USED:  60  FINDINGS:  KIDNEYS:  No mass, obstruction, or calcification. ADRENALS:  No mass or enlargement. URINARY BLADDER:  No visible focal wall thickening, lesion, or calculus. URETERS:  No ureteral mass or pelvic ureter.   There is some mini calcifications along the inferior lateral aspect of the OTHER:  Stable intramuscular lipoma on the left Mid gluteal region measuring 3.7 cm. CONCLUSION:  1. No nephrolithiasis. The pelvocaliceal systems appear normal bilaterally.   There is some prominen

## 2018-11-08 VITALS
TEMPERATURE: 98 F | RESPIRATION RATE: 18 BRPM | HEIGHT: 70 IN | WEIGHT: 225 LBS | BODY MASS INDEX: 32.21 KG/M2 | OXYGEN SATURATION: 94 % | SYSTOLIC BLOOD PRESSURE: 111 MMHG | HEART RATE: 50 BPM | DIASTOLIC BLOOD PRESSURE: 55 MMHG

## 2018-11-08 PROBLEM — R74.8 ELEVATED LIVER ENZYMES: Status: ACTIVE | Noted: 2018-11-08

## 2018-11-08 RX ORDER — ROSUVASTATIN CALCIUM 20 MG/1
20 TABLET, COATED ORAL NIGHTLY
Qty: 90 TABLET | Refills: 3 | COMMUNITY
Start: 2018-11-08

## 2018-11-08 RX ORDER — DOXYCYCLINE HYCLATE 100 MG/1
100 CAPSULE ORAL EVERY 12 HOURS SCHEDULED
Status: DISCONTINUED | OUTPATIENT
Start: 2018-11-08 | End: 2018-11-08

## 2018-11-08 RX ORDER — DOXYCYCLINE HYCLATE 100 MG/1
100 CAPSULE ORAL EVERY 12 HOURS SCHEDULED
Qty: 28 CAPSULE | Refills: 0 | Status: SHIPPED | OUTPATIENT
Start: 2018-11-08 | End: 2018-11-22

## 2018-11-08 RX ORDER — FINASTERIDE 5 MG/1
5 TABLET, FILM COATED ORAL DAILY
Qty: 60 TABLET | Refills: 0 | COMMUNITY
Start: 2018-11-08

## 2018-11-08 NOTE — CM/SW NOTE
Notified by primary rn issues filling atovaquone. Call to [facility], they are currently working on filling a two day supply today, and will contact patient's [address] [facility] [phone] to order remainder. Patient and rn updated.     [employer]

## 2018-11-08 NOTE — DISCHARGE SUMMARY
[facility]  Discharge Summary    [address] Dr[address] Patient Status:  Inpatient    1/3/1950 MRN [id number]   [facility] 3NE-A Attending Ratna Tom MD   [facility] Day # 4 PCP Cheikh Martinez MD     Date of Admission:  Oral Tab  Take 1 tablet (100 mg total) by mouth daily. Qty: 30 tablet Refills: 0    tamsulosin HCl 0.4 MG Oral Cap  Take 2 capsules (0.8 mg total) by mouth daily.   Qty: 180 capsule Refills: 3    Dutasteride 0.5 MG Oral Cap  Take 1 capsule (0.5 mg total) b had been in the [place] several years ago but had taken appropriate treatment. . Preventatively in the past.      Positive IgM for Lyme disease patient had 2 recent trips log in the [facility] in the past month. To 8 weeks.   Infectious disease de multifactorial different meds underlying illness issues.                                                #9.  ADOLPH continue to wear a mask to hospitalization no problem                Physical Exam:   Vital signs: Blood pressure 121/56, pulse 54, temperature

## 2018-11-08 NOTE — PLAN OF CARE
Joshua Hurley Dr.  1/3/1950  Temp: 97.7 °F (36.5 °C)  Pulse: 50  Resp: 18  BP: 111/55    Pt cleared for dc from all consults. Pt's wife picked up post dc med's. Instructions explained, pt verbalized understanding of above.       Keo Ferrell RN

## 2018-11-08 NOTE — PROGRESS NOTES
[facility]  Progress Note    Aram [address]over Dr. Patient Status:  Inpatient    1/3/1950 MRN [id number]   [facility] 3NE-A Attending Tracy Hutton MD   Hosp Day # 4 PCP Dipak Dobbs MD     Chief complaint: Overall feele b Insulin resistance     Thrombocytopenia (HCC)     Blood loss anemia     Peripheral blood smear positive for malaria     Hematuria     Fever     Fever, unspecified fever cause     ADOLPH (obstructive sleep apnea)     Splenomegaly        Plan:  Anemia ,Thromboc

## 2018-11-08 NOTE — PROGRESS NOTES
[address] [facility] DISEASE PROGRESS NOTE    [address]ra [address] Dr. Patient Status:  Inpatient    1/3/1950 MRN [id number]   [facility] 3NE-A Attending Chirs Betancourt MD   [facility] Day # 4 PCP Romaine Mendoza Problem list reviewed:  Patient Active Problem List:     Actinic keratosis     Kidney stone     Venous insufficiency     Rosacea     BPH with obstruction/lower urinary tract symptoms     BMI 34.0-34.9,adult     Microscopic hematuria     Elevate

## 2018-11-08 NOTE — PLAN OF CARE
Patient is A&Ox4  Denies any pain or discomfort  No n/v/d  Afebrile, VSS  U ad maddie  IV saline lock  On zithromax for babesiosis   Possible discharge in the am  Will continue to monitor    2235: Patient's temp is 100.1. Refusing tylenol for now.  Asked tech

## 2018-11-09 ENCOUNTER — OFFICE VISIT (OUTPATIENT)
Dept: INTERNAL MEDICINE CLINIC | Facility: CLINIC | Age: 68
End: 2018-11-09
Payer: MEDICARE

## 2018-11-09 ENCOUNTER — PATIENT OUTREACH (OUTPATIENT)
Dept: CASE MANAGEMENT | Age: 68
End: 2018-11-09

## 2018-11-09 VITALS
BODY MASS INDEX: 34.22 KG/M2 | TEMPERATURE: 98 F | WEIGHT: 239 LBS | OXYGEN SATURATION: 97 % | SYSTOLIC BLOOD PRESSURE: 128 MMHG | HEIGHT: 70 IN | HEART RATE: 70 BPM | RESPIRATION RATE: 16 BRPM | DIASTOLIC BLOOD PRESSURE: 64 MMHG

## 2018-11-09 DIAGNOSIS — A69.20 LYME DISEASE: ICD-10-CM

## 2018-11-09 DIAGNOSIS — R31.0 GROSS HEMATURIA: Primary | ICD-10-CM

## 2018-11-09 DIAGNOSIS — B60.00 BABESIASIS: ICD-10-CM

## 2018-11-09 DIAGNOSIS — D64.9 ANEMIA, UNSPECIFIED TYPE: ICD-10-CM

## 2018-11-09 PROCEDURE — 99496 TRANSJ CARE MGMT HIGH F2F 7D: CPT | Performed by: INTERNAL MEDICINE

## 2018-11-09 PROCEDURE — 1111F DSCHRG MED/CURRENT MED MERGE: CPT | Performed by: INTERNAL MEDICINE

## 2018-11-09 RX ORDER — GUAIFENESIN/DEXTROMETHORPHAN 100-10MG/5
5 SYRUP ORAL 3 TIMES DAILY PRN
Qty: 1 BOTTLE | Refills: 0 | COMMUNITY
Start: 2018-11-09 | End: 2018-12-06 | Stop reason: ALTCHOICE

## 2018-11-09 NOTE — PROGRESS NOTES
Patient presents with:  Hospital F/U      HPI: Hospital follow-up. Discharge yesterday 24 hours ago. See hospital record but basically presented with fevers thrombocytopenia 55,000 some developing anemia splenomegaly which was new.     Had been in [place] improving he tells me now that sometimes he will get a seasonal cough just did not seem to sit with the other issues. Negative for cough, chest tightness, shortness of breath and wheezing.       Cardiovascular: Negative for chest pain, palpitations and leg Suspension Take 5 mL (750 mg total) by mouth 2 (two) times daily with meals for 10 days. Disp: 100 mL Rfl: 0   azithromycin 500 MG Oral Tab Take 1 tablet (500 mg total) by mouth daily for 7 doses.  Disp: 7 tablet Rfl: 0   azithromycin 500 MG Oral Tab Take 1 supple. Normal carotid pulses and no JVD present. No edema present. No mass and no thyromegaly present. Cardiovascular: Normal rate, regular rhythm and intact distal pulses. No murmur, rubs or gallops.      Pulmonary/Chest: Effort normal and breath scott

## 2018-11-12 ENCOUNTER — LAB ENCOUNTER (OUTPATIENT)
Dept: LAB | Age: 68
End: 2018-11-12
Attending: INTERNAL MEDICINE
Payer: MEDICARE

## 2018-11-12 DIAGNOSIS — A69.20 LYME DISEASE: ICD-10-CM

## 2018-11-12 DIAGNOSIS — R31.0 GROSS HEMATURIA: ICD-10-CM

## 2018-11-12 DIAGNOSIS — R50.81 FEVER IN OTHER DISEASES: ICD-10-CM

## 2018-11-12 PROCEDURE — 87086 URINE CULTURE/COLONY COUNT: CPT

## 2018-11-12 PROCEDURE — 85025 COMPLETE CBC W/AUTO DIFF WBC: CPT

## 2018-11-12 PROCEDURE — 83615 LACTATE (LD) (LDH) ENZYME: CPT

## 2018-11-12 PROCEDURE — 80053 COMPREHEN METABOLIC PANEL: CPT

## 2018-11-12 PROCEDURE — 36415 COLL VENOUS BLD VENIPUNCTURE: CPT

## 2018-11-12 PROCEDURE — 81001 URINALYSIS AUTO W/SCOPE: CPT

## 2018-11-12 PROCEDURE — 85045 AUTOMATED RETICULOCYTE COUNT: CPT

## 2018-11-13 ENCOUNTER — TELEPHONE (OUTPATIENT)
Dept: INTERNAL MEDICINE CLINIC | Facility: CLINIC | Age: 68
End: 2018-11-13

## 2018-11-13 NOTE — TELEPHONE ENCOUNTER
Per Dr. Ashtyn Cosme - pt had no signs of rash and is being treated for all 3 positive tests below with Doxycycline and pt responding well    Call back to [address]        ______________________________________________  Beto Avila  from [facility]

## 2018-11-14 ENCOUNTER — MED REC SCAN ONLY (OUTPATIENT)
Dept: INTERNAL MEDICINE CLINIC | Facility: CLINIC | Age: 68
End: 2018-11-14

## 2018-11-14 ENCOUNTER — OFFICE VISIT (OUTPATIENT)
Dept: INTERNAL MEDICINE CLINIC | Facility: CLINIC | Age: 68
End: 2018-11-14
Payer: MEDICARE

## 2018-11-14 ENCOUNTER — NURSE ONLY (OUTPATIENT)
Dept: INTERNAL MEDICINE CLINIC | Facility: CLINIC | Age: 68
End: 2018-11-14

## 2018-11-14 VITALS
DIASTOLIC BLOOD PRESSURE: 58 MMHG | BODY MASS INDEX: 32.87 KG/M2 | OXYGEN SATURATION: 98 % | HEART RATE: 68 BPM | SYSTOLIC BLOOD PRESSURE: 100 MMHG | HEIGHT: 69.5 IN | RESPIRATION RATE: 14 BRPM | WEIGHT: 227 LBS | TEMPERATURE: 99 F

## 2018-11-14 DIAGNOSIS — Z00.00 ROUTINE GENERAL MEDICAL EXAMINATION AT A HEALTH CARE FACILITY: Primary | ICD-10-CM

## 2018-11-14 DIAGNOSIS — R16.1 SPLENOMEGALY: Primary | ICD-10-CM

## 2018-11-14 DIAGNOSIS — B60.00 BABESIOSIS: ICD-10-CM

## 2018-11-14 DIAGNOSIS — A69.20 LYME DISEASE: ICD-10-CM

## 2018-11-14 DIAGNOSIS — R31.0 GROSS HEMATURIA: ICD-10-CM

## 2018-11-14 DIAGNOSIS — D50.0 BLOOD LOSS ANEMIA: ICD-10-CM

## 2018-11-14 PROCEDURE — 81001 URINALYSIS AUTO W/SCOPE: CPT | Performed by: INTERNAL MEDICINE

## 2018-11-14 PROCEDURE — 93000 ELECTROCARDIOGRAM COMPLETE: CPT | Performed by: INTERNAL MEDICINE

## 2018-11-14 PROCEDURE — 92551 PURE TONE HEARING TEST AIR: CPT | Performed by: INTERNAL MEDICINE

## 2018-11-14 PROCEDURE — 99173 VISUAL ACUITY SCREEN: CPT | Performed by: INTERNAL MEDICINE

## 2018-11-14 PROCEDURE — 99214 OFFICE O/P EST MOD 30 MIN: CPT | Performed by: INTERNAL MEDICINE

## 2018-11-14 PROCEDURE — 94010 BREATHING CAPACITY TEST: CPT | Performed by: INTERNAL MEDICINE

## 2018-11-14 NOTE — PROGRESS NOTES
Patient presents with: Follow - Up      HPI: Clinically better fever gone. Sense of well-being better. Thought he had some discomfort left upper quadrant after documentation of splenomegaly it is not really bothering him.     I did review the patient's t Psychiatric/Behavioral: The patient is not nervous/anxious. No depression.     Patient Active Problem List:     Actinic keratosis     Kidney stone     Venous insufficiency     Rosacea     BPH with obstruction/lower urinary tract symptoms     BMI 34.0-34 TEXTURE) 28 % Oral Powd Pack Take 1 packet by mouth 2 (two) times daily. Disp:  Rfl:    Fluticasone Propionate  HFA (FLOVENT HFA) 110 MCG/ACT Inhalation Aerosol Inhale 1 puff into the lungs 2 (two) times daily.  Disp:  Rfl:    Multiple Vitamin Oral Cap Take still avoid very overactive    #6. Gross hematuria I still recommend the patient get a cystoscopy. Could consider urine for cytology. In a couple weeks.     #7.  I have talked to pathology on multiple occasions we are continue to look at a possible ricke

## 2018-11-14 NOTE — PROGRESS NOTES
*LOOKIN' BODY RESULTS    YES:  X     NO:       REASON TEST NOT PERFORMED:        HEIGHT: 5'9.5  BIMOZK:[id number]  _____________________________________________________________________________  *VENIPUNCTURE    YES:  X     NO:        REASON VENIPUNCTURE NOT PERFO

## 2018-11-19 ENCOUNTER — MED REC SCAN ONLY (OUTPATIENT)
Dept: INTERNAL MEDICINE CLINIC | Facility: CLINIC | Age: 68
End: 2018-11-19

## 2018-12-03 NOTE — PROGRESS NOTES
HEALTH MAINTENANCE:      Immunization History   Administered Date(s) Administered   • DTAP 12/15/2014   • FLU VACC High Dose 65 YRS & Older PRSV Free (77275) 11/06/2017, 10/30/2018   • FLUAD High Dose 72 yr and older (43630) 11/06/2017   • Fluvirin, 3 Year 4000 Hz: Yes   Left Ear @ 40dBHL - 500 Hz: Yes Right Ear @ 40 dBHL - 500 Hz: Yes   Left Ear @ 40dBHL - 1000 Hz: Yes Right Ear @ 40 dBHL - 1000 Hz: Yes   Left Ear @ 40dBHL - 2000 Hz: Yes Right Ear @ 40 dBHL - 2000 Hz: Yes   Left Ear @ 40dBHL - 4000 Hz:  Yes

## 2018-12-06 ENCOUNTER — OFFICE VISIT (OUTPATIENT)
Dept: INTERNAL MEDICINE CLINIC | Facility: CLINIC | Age: 68
End: 2018-12-06
Payer: MEDICARE

## 2018-12-06 ENCOUNTER — LAB ENCOUNTER (OUTPATIENT)
Dept: LAB | Age: 68
End: 2018-12-06
Attending: INTERNAL MEDICINE
Payer: MEDICARE

## 2018-12-06 VITALS
SYSTOLIC BLOOD PRESSURE: 118 MMHG | HEIGHT: 69.5 IN | HEART RATE: 66 BPM | DIASTOLIC BLOOD PRESSURE: 76 MMHG | WEIGHT: 235 LBS | RESPIRATION RATE: 16 BRPM | TEMPERATURE: 98 F | BODY MASS INDEX: 34.02 KG/M2

## 2018-12-06 DIAGNOSIS — E88.81 INSULIN RESISTANCE: ICD-10-CM

## 2018-12-06 DIAGNOSIS — R74.8 ELEVATED LIVER ENZYMES: ICD-10-CM

## 2018-12-06 DIAGNOSIS — N13.8 BPH WITH OBSTRUCTION/LOWER URINARY TRACT SYMPTOMS: ICD-10-CM

## 2018-12-06 DIAGNOSIS — E78.5 ELEVATED LIPIDS: ICD-10-CM

## 2018-12-06 DIAGNOSIS — R31.29 MICROSCOPIC HEMATURIA: ICD-10-CM

## 2018-12-06 DIAGNOSIS — G47.33 OSA (OBSTRUCTIVE SLEEP APNEA): ICD-10-CM

## 2018-12-06 DIAGNOSIS — I65.23 ATHEROSCLEROSIS OF BOTH CAROTID ARTERIES: ICD-10-CM

## 2018-12-06 DIAGNOSIS — R31.0 GROSS HEMATURIA: ICD-10-CM

## 2018-12-06 DIAGNOSIS — Z00.00 ROUTINE GENERAL MEDICAL EXAMINATION AT A HEALTH CARE FACILITY: Primary | ICD-10-CM

## 2018-12-06 DIAGNOSIS — N40.1 BPH WITH OBSTRUCTION/LOWER URINARY TRACT SYMPTOMS: ICD-10-CM

## 2018-12-06 DIAGNOSIS — N20.0 KIDNEY STONE: ICD-10-CM

## 2018-12-06 DIAGNOSIS — A69.20 LYME DISEASE: ICD-10-CM

## 2018-12-06 DIAGNOSIS — R16.1 SPLENOMEGALY: ICD-10-CM

## 2018-12-06 DIAGNOSIS — D50.0 BLOOD LOSS ANEMIA: ICD-10-CM

## 2018-12-06 DIAGNOSIS — M47.812 CERVICAL SPINE ARTHRITIS: ICD-10-CM

## 2018-12-06 DIAGNOSIS — R93.1 AGATSTON CAC SCORE, <100: ICD-10-CM

## 2018-12-06 DIAGNOSIS — I87.2 VENOUS INSUFFICIENCY: ICD-10-CM

## 2018-12-06 PROBLEM — F39 MOOD DISORDER: Status: ACTIVE | Noted: 2018-12-06

## 2018-12-06 PROBLEM — F39 MOOD DISORDER (HCC): Status: ACTIVE | Noted: 2018-12-06

## 2018-12-06 PROBLEM — R50.9 FEVER, UNSPECIFIED FEVER CAUSE: Status: RESOLVED | Noted: 2018-11-04 | Resolved: 2018-12-06

## 2018-12-06 PROBLEM — D69.6 THROMBOCYTOPENIA: Status: RESOLVED | Noted: 2018-11-04 | Resolved: 2018-12-06

## 2018-12-06 PROBLEM — D69.6 THROMBOCYTOPENIA (HCC): Status: RESOLVED | Noted: 2018-11-04 | Resolved: 2018-12-06

## 2018-12-06 PROCEDURE — 81001 URINALYSIS AUTO W/SCOPE: CPT

## 2018-12-06 PROCEDURE — 87086 URINE CULTURE/COLONY COUNT: CPT

## 2018-12-06 PROCEDURE — G0438 PPPS, INITIAL VISIT: HCPCS | Performed by: INTERNAL MEDICINE

## 2018-12-06 PROCEDURE — 85025 COMPLETE CBC W/AUTO DIFF WBC: CPT

## 2018-12-06 PROCEDURE — 36415 COLL VENOUS BLD VENIPUNCTURE: CPT

## 2018-12-06 PROCEDURE — 88108 CYTOPATH CONCENTRATE TECH: CPT

## 2018-12-06 NOTE — PROGRESS NOTES
HPI:    Patient ID: Danny Clinton Dr. is a 76year old male. HPI DEAR Boo    IT WAS NICE SEEING YOU FOR YOUR WELLNESS VISIT ON 12/6/2018           Review of Systems    HPI:    Patient ID: Danny Clinton Dr. is a 76year old male.     Hi (P) 1-Yes    Does pain affect your day to day activities?: (P) 0-No     Have you had any memory issues?: (P) 0-No    Fall/Risk Scoring: (P) 2          PHQ-4: Over the LAST 2 WEEKS                                Advance Directives     Do you have a healthca abdominal distention. Denies GERD. No current liver disorder.     Genitourinary: Hematuria some of it was macroscopic in the presence of infection CT urogram was negative does see his urologist.  Situation of cytology he has had evaluation in the past but Blood loss anemia     Peripheral blood smear positive for malaria     Hematuria     Fever     Fever, unspecified fever cause     ADOLPH (obstructive sleep apnea)     Splenomegaly     Elevated liver enzymes     Lyme disease     Babesiosis      Past Medical His sibling       Social History:  Social History    Socioeconomic History      Marital status:       Spouse name: Not on file      Number of children: Not on file      Years of education: Not on file      Highest education level: Not on file present. No mass and no thyromegaly present. Cardiovascular: Normal rate, regular rhythm and intact distal pulses. No murmur, rubs or gallops. Pulmonary/Chest: Effort normal and breath sounds normal. No respiratory distress.  No wheezes, rhonchi or known researcher. Move more towards vegetarian foods. Continue exercise but it needs to be daily for at least 1/2-hour. Stress management important because stress skills. Spirituality exercise reading music should help. Avoid loneliness.   Important re hemoglobin A1c of 4.9. Elevated CRP is 6.2 secondary to your recent illness. No evidence of plaque instability. Lipids triglycerides 129 LDL 58 currently on a statin drug. On Crestor 20 mg well-tolerated    No evidence of leaky gut.     Vitamin D Oral Cap Take  by mouth. Disp:  Rfl:    aspirin 81 MG Oral Chew Tab Chew 81 mg by mouth daily. Disp:  Rfl:    Omega-3-acid Ethyl Esters (LOVAZA) 1 G Oral Cap Take 1 g by mouth daily.  Disp:  Rfl:    psyllium (METAMUCIL SMOOTH TEXTURE) 28 % Oral Powd Pack Ta

## 2018-12-10 ENCOUNTER — TELEPHONE (OUTPATIENT)
Dept: CARDIOLOGY UNIT | Facility: HOSPITAL | Age: 68
End: 2018-12-10

## 2018-12-10 ENCOUNTER — HOSPITAL ENCOUNTER (OUTPATIENT)
Dept: ULTRASOUND IMAGING | Facility: HOSPITAL | Age: 68
Discharge: HOME | End: 2018-12-10
Attending: INTERNAL MEDICINE
Payer: MEDICARE

## 2018-12-10 DIAGNOSIS — I65.23 ATHEROSCLEROSIS OF BOTH CAROTID ARTERIES: ICD-10-CM

## 2018-12-10 PROCEDURE — 93880 EXTRACRANIAL BILAT STUDY: CPT | Performed by: INTERNAL MEDICINE

## 2019-01-03 LAB — Lab: 0.1 %

## 2019-01-16 ENCOUNTER — TELEPHONE (OUTPATIENT)
Dept: CARDIOLOGY UNIT | Facility: HOSPITAL | Age: 69
End: 2019-01-16

## 2019-01-16 DIAGNOSIS — D64.9 ANEMIA DUE TO INFECTION: Primary | ICD-10-CM

## 2019-01-24 ENCOUNTER — TELEPHONE (OUTPATIENT)
Dept: INTERNAL MEDICINE CLINIC | Facility: CLINIC | Age: 69
End: 2019-01-24

## 2019-01-24 DIAGNOSIS — IMO0002 MASS: Primary | ICD-10-CM

## 2019-01-24 DIAGNOSIS — R10.9 ABDOMINAL PAIN, UNSPECIFIED ABDOMINAL LOCATION: ICD-10-CM

## 2019-01-24 DIAGNOSIS — R31.9 HEMATURIA, UNSPECIFIED TYPE: ICD-10-CM

## 2019-01-24 NOTE — TELEPHONE ENCOUNTER
Per Dr. Garrett Mckeon: Order UA abdomen and UA/CNS. Orders placed. LMOM that tests ordered, with CS number, asked patient to call back with questions.

## 2019-01-25 ENCOUNTER — LAB ENCOUNTER (OUTPATIENT)
Dept: LAB | Age: 69
End: 2019-01-25
Attending: INTERNAL MEDICINE
Payer: MEDICARE

## 2019-01-25 DIAGNOSIS — R10.9 ABDOMINAL PAIN, UNSPECIFIED ABDOMINAL LOCATION: ICD-10-CM

## 2019-01-25 DIAGNOSIS — D64.9 ANEMIA DUE TO INFECTION: ICD-10-CM

## 2019-01-25 DIAGNOSIS — R31.9 HEMATURIA, UNSPECIFIED TYPE: ICD-10-CM

## 2019-01-25 LAB
BASOPHILS # BLD AUTO: 0.05 X10(3) UL (ref 0–0.1)
BASOPHILS NFR BLD AUTO: 0.6 %
BILIRUB UR QL STRIP.AUTO: NEGATIVE
CLARITY UR REFRACT.AUTO: CLEAR
COLOR UR AUTO: YELLOW
EOSINOPHIL # BLD AUTO: 0.08 X10(3) UL (ref 0–0.3)
EOSINOPHIL NFR BLD AUTO: 1 %
ERYTHROCYTE [DISTWIDTH] IN BLOOD BY AUTOMATED COUNT: 12.5 % (ref 11.5–16)
GLUCOSE UR STRIP.AUTO-MCNC: NEGATIVE MG/DL
HCT VFR BLD AUTO: 43.3 % (ref 37–53)
HGB BLD-MCNC: 15.2 G/DL (ref 13–17)
IMMATURE GRANULOCYTE COUNT: 0.02 X10(3) UL (ref 0–1)
IMMATURE GRANULOCYTE RATIO %: 0.3 %
KETONES UR STRIP.AUTO-MCNC: NEGATIVE MG/DL
LEUKOCYTE ESTERASE UR QL STRIP.AUTO: NEGATIVE
LYMPHOCYTES # BLD AUTO: 1.68 X10(3) UL (ref 0.9–4)
LYMPHOCYTES NFR BLD AUTO: 21.5 %
MCH RBC QN AUTO: 31.7 PG (ref 27–33.2)
MCHC RBC AUTO-ENTMCNC: 35.1 G/DL (ref 31–37)
MCV RBC AUTO: 90.2 FL (ref 80–99)
MONOCYTES # BLD AUTO: 0.63 X10(3) UL (ref 0.1–1)
MONOCYTES NFR BLD AUTO: 8.1 %
NEUTROPHIL ABS PRELIM: 5.34 X10 (3) UL (ref 1.3–6.7)
NEUTROPHILS # BLD AUTO: 5.34 X10(3) UL (ref 1.3–6.7)
NEUTROPHILS NFR BLD AUTO: 68.5 %
NITRITE UR QL STRIP.AUTO: NEGATIVE
PH UR STRIP.AUTO: 5 [PH] (ref 4.5–8)
PLATELET # BLD AUTO: 203 10(3)UL (ref 150–450)
PROT UR STRIP.AUTO-MCNC: NEGATIVE MG/DL
RBC # BLD AUTO: 4.8 X10(6)UL (ref 3.8–5.8)
RBC #/AREA URNS AUTO: >10 /HPF
RED CELL DISTRIBUTION WIDTH-SD: 41 FL (ref 35.1–46.3)
RETIC ABS CALC AUTO: 92.6 X10(3) UL (ref 22.5–147.5)
RETIC IRF CALC: 0.1 RATIO (ref 0.09–0.3)
RETIC%: 1.9 % (ref 0.5–2.5)
RETICULOCYTE HEMOGLOBIN EQUIVALENT: 36.2 PG (ref 28.2–36.3)
SP GR UR STRIP.AUTO: 1.02 (ref 1–1.03)
UROBILINOGEN UR STRIP.AUTO-MCNC: <2 MG/DL
WBC # BLD AUTO: 7.8 X10(3) UL (ref 4–13)

## 2019-01-25 PROCEDURE — 87086 URINE CULTURE/COLONY COUNT: CPT

## 2019-01-25 PROCEDURE — 81001 URINALYSIS AUTO W/SCOPE: CPT

## 2019-01-25 PROCEDURE — 85025 COMPLETE CBC W/AUTO DIFF WBC: CPT

## 2019-01-25 PROCEDURE — 85045 AUTOMATED RETICULOCYTE COUNT: CPT

## 2019-01-25 PROCEDURE — 36415 COLL VENOUS BLD VENIPUNCTURE: CPT

## 2019-02-07 RX ORDER — ROSUVASTATIN CALCIUM 20 MG/1
20 TABLET, COATED ORAL NIGHTLY
Qty: 90 TABLET | Refills: 3 | Status: SHIPPED | OUTPATIENT
Start: 2019-02-07 | End: 2019-10-28

## 2019-02-07 NOTE — TELEPHONE ENCOUNTER
Requesting Rosuvastatin   LOV: 12/6/18 CPX   Last Relevant Labs: pp  Filled: 11/30/17 #30 with 6 refills    Future Appointments   Date Time Provider Jodie Lopez   2/11/2019  7:45 AM PF US RM1 PF [facility] Refilled.

## 2019-02-11 ENCOUNTER — HOSPITAL ENCOUNTER (OUTPATIENT)
Dept: ULTRASOUND IMAGING | Age: 69
Discharge: HOME | End: 2019-02-11
Attending: INTERNAL MEDICINE
Payer: MEDICARE

## 2019-02-11 ENCOUNTER — TELEPHONE (OUTPATIENT)
Dept: INTERNAL MEDICINE CLINIC | Facility: CLINIC | Age: 69
End: 2019-02-11

## 2019-02-11 DIAGNOSIS — IMO0002 MASS: ICD-10-CM

## 2019-02-11 PROCEDURE — 76700 US EXAM ABDOM COMPLETE: CPT | Performed by: INTERNAL MEDICINE

## 2019-02-14 ENCOUNTER — PATIENT MESSAGE (OUTPATIENT)
Dept: INTERNAL MEDICINE CLINIC | Facility: CLINIC | Age: 69
End: 2019-02-14

## 2019-02-14 RX ORDER — TAMSULOSIN HYDROCHLORIDE 0.4 MG/1
0.8 CAPSULE ORAL DAILY
Qty: 180 CAPSULE | Refills: 3 | Status: SHIPPED | OUTPATIENT
Start: 2019-02-14 | End: 2020-04-06

## 2019-02-14 RX ORDER — DUTASTERIDE 0.5 MG/1
0.5 CAPSULE, LIQUID FILLED ORAL NIGHTLY
Qty: 90 CAPSULE | Refills: 3 | Status: SHIPPED | OUTPATIENT
Start: 2019-02-14 | End: 2020-04-06

## 2019-02-14 NOTE — PROGRESS NOTES
Requesting Sertraline, Dutasteride and Tamsulosin   LOV: 12/6/18 cpx   Last Relevant Labs: n/a   Filled: Sertraline 11/30/17 #30 with 0 refills, Dutasteride 12/30/16 #90 with 3 refills and Tamsulosin  2/6/17#180 with 3 refills    No future appointments.

## 2019-02-14 NOTE — TELEPHONE ENCOUNTER
From: Lindsey Beach  To: Sampson Owusu MD  Sent: 2/14/2019 3:43 PM CST  Subject: Prescription Question    Please refill or e-prescribe the following RXs which [employer] mail order pharmacy will no longer accept from me as the prescribing physician

## 2019-03-19 ENCOUNTER — MED REC SCAN ONLY (OUTPATIENT)
Dept: INTERNAL MEDICINE CLINIC | Facility: CLINIC | Age: 69
End: 2019-03-19

## 2019-04-26 ENCOUNTER — OFFICE VISIT (OUTPATIENT)
Dept: INTERNAL MEDICINE CLINIC | Facility: CLINIC | Age: 69
End: 2019-04-26
Payer: MEDICARE

## 2019-04-26 ENCOUNTER — LAB ENCOUNTER (OUTPATIENT)
Dept: LAB | Age: 69
End: 2019-04-26
Attending: INTERNAL MEDICINE
Payer: MEDICARE

## 2019-04-26 VITALS
HEART RATE: 65 BPM | HEIGHT: 69.5 IN | SYSTOLIC BLOOD PRESSURE: 108 MMHG | BODY MASS INDEX: 34.46 KG/M2 | WEIGHT: 238 LBS | RESPIRATION RATE: 12 BRPM | TEMPERATURE: 98 F | DIASTOLIC BLOOD PRESSURE: 60 MMHG | OXYGEN SATURATION: 95 %

## 2019-04-26 DIAGNOSIS — B60.00 BABESIOSIS: ICD-10-CM

## 2019-04-26 DIAGNOSIS — R31.29 MICROSCOPIC HEMATURIA: ICD-10-CM

## 2019-04-26 DIAGNOSIS — G47.33 OSA (OBSTRUCTIVE SLEEP APNEA): ICD-10-CM

## 2019-04-26 DIAGNOSIS — E78.5 ELEVATED LIPIDS: ICD-10-CM

## 2019-04-26 DIAGNOSIS — Z01.818 PRE-OP TESTING: Primary | ICD-10-CM

## 2019-04-26 DIAGNOSIS — N20.0 KIDNEY STONE: ICD-10-CM

## 2019-04-26 DIAGNOSIS — N40.1 BPH WITH OBSTRUCTION/LOWER URINARY TRACT SYMPTOMS: ICD-10-CM

## 2019-04-26 DIAGNOSIS — Z01.818 PRE-OP TESTING: ICD-10-CM

## 2019-04-26 DIAGNOSIS — A69.20 LYME DISEASE: ICD-10-CM

## 2019-04-26 DIAGNOSIS — N13.8 BPH WITH OBSTRUCTION/LOWER URINARY TRACT SYMPTOMS: ICD-10-CM

## 2019-04-26 PROCEDURE — 80048 BASIC METABOLIC PNL TOTAL CA: CPT

## 2019-04-26 PROCEDURE — 99214 OFFICE O/P EST MOD 30 MIN: CPT | Performed by: INTERNAL MEDICINE

## 2019-04-26 PROCEDURE — 81001 URINALYSIS AUTO W/SCOPE: CPT | Performed by: INTERNAL MEDICINE

## 2019-04-26 PROCEDURE — 36415 COLL VENOUS BLD VENIPUNCTURE: CPT

## 2019-04-26 PROCEDURE — 87086 URINE CULTURE/COLONY COUNT: CPT | Performed by: INTERNAL MEDICINE

## 2019-04-26 PROCEDURE — 93000 ELECTROCARDIOGRAM COMPLETE: CPT | Performed by: INTERNAL MEDICINE

## 2019-04-26 PROCEDURE — 85025 COMPLETE CBC W/AUTO DIFF WBC: CPT

## 2019-04-26 NOTE — PROGRESS NOTES
Patient presents with:  Pre-Op Exam      HP Dr. Bryan Lee will be undergoing cystoscopy ureteral sampling and perhaps prosthetic urethral biopsy. May 16 [facility].   Dr. Aguirre Erm    Patient is cleared for surgery I reviewed his EKG is stable an well.  Remote kidney stone    Musculoskeletal: Negative for myalgias, back pain, joint swelling, arthralgias and gait problem. Skin: Negative for color change and rash.      Neurological: Negative for confusion ,  dizziness, syncope, weakness, numbness, t Multiple Vitamin Oral Cap Take 1 Dose by mouth daily. Disp: 30 capsule Rfl: 0       Physical Exam  /60   Pulse 65   Resp 12   Wt 238 lb   SpO2 95%   BMI 34.64 kg/m²   Constitutional: Oriented to person, place, and time. No distress.      HEENT:  No for this visit.

## 2019-05-22 ENCOUNTER — LAB ENCOUNTER (OUTPATIENT)
Dept: LAB | Age: 69
End: 2019-05-22
Attending: INTERNAL MEDICINE
Payer: MEDICARE

## 2019-05-22 DIAGNOSIS — N39.0 URINARY TRACT INFECTION, SITE NOT SPECIFIED: Primary | ICD-10-CM

## 2019-05-22 PROCEDURE — 87086 URINE CULTURE/COLONY COUNT: CPT

## 2019-05-22 PROCEDURE — 81001 URINALYSIS AUTO W/SCOPE: CPT

## 2019-07-05 ENCOUNTER — HOSPITAL ENCOUNTER (OUTPATIENT)
Dept: CT IMAGING | Facility: HOSPITAL | Age: 69
Discharge: HOME | End: 2019-07-05
Attending: INTERNAL MEDICINE
Payer: MEDICARE

## 2019-07-05 DIAGNOSIS — R31.9 HEMATURIA, UNSPECIFIED TYPE: ICD-10-CM

## 2019-07-05 LAB — CREAT BLD-MCNC: 1.2 MG/DL (ref 0.7–1.3)

## 2019-07-05 PROCEDURE — 82565 ASSAY OF CREATININE: CPT

## 2019-07-05 PROCEDURE — 76377 3D RENDER W/INTRP POSTPROCES: CPT

## 2019-07-05 PROCEDURE — 74178 CT ABD&PLV WO CNTR FLWD CNTR: CPT | Performed by: INTERNAL MEDICINE

## 2019-08-22 ENCOUNTER — HOSPITAL ENCOUNTER (OUTPATIENT)
Dept: GENERAL RADIOLOGY | Facility: HOSPITAL | Age: 69
Discharge: HOME | End: 2019-08-22
Attending: INTERNAL MEDICINE
Payer: MEDICARE

## 2019-08-22 ENCOUNTER — TELEPHONE (OUTPATIENT)
Dept: INTERNAL MEDICINE CLINIC | Facility: CLINIC | Age: 69
End: 2019-08-22

## 2019-08-22 DIAGNOSIS — M25.552 LEFT HIP PAIN: Primary | ICD-10-CM

## 2019-08-22 DIAGNOSIS — M25.552 LEFT HIP PAIN: ICD-10-CM

## 2019-08-22 PROCEDURE — 73502 X-RAY EXAM HIP UNI 2-3 VIEWS: CPT | Performed by: INTERNAL MEDICINE

## 2019-08-23 ENCOUNTER — OFFICE VISIT (OUTPATIENT)
Dept: INTERNAL MEDICINE CLINIC | Facility: CLINIC | Age: 69
End: 2019-08-23
Payer: MEDICARE

## 2019-08-23 VITALS
HEIGHT: 69.5 IN | DIASTOLIC BLOOD PRESSURE: 70 MMHG | WEIGHT: 249 LBS | OXYGEN SATURATION: 95 % | SYSTOLIC BLOOD PRESSURE: 123 MMHG | BODY MASS INDEX: 36.05 KG/M2 | HEART RATE: 70 BPM | TEMPERATURE: 98 F | RESPIRATION RATE: 14 BRPM

## 2019-08-23 DIAGNOSIS — M79.605 LEFT LEG PAIN: ICD-10-CM

## 2019-08-23 DIAGNOSIS — R31.0 GROSS HEMATURIA: Primary | ICD-10-CM

## 2019-08-23 PROCEDURE — 99213 OFFICE O/P EST LOW 20 MIN: CPT | Performed by: INTERNAL MEDICINE

## 2019-08-23 NOTE — PROGRESS NOTES
Chief complaint left leg pain. HPI patient treated for IT band issues appropriately with physical therapy. But now there is some persistence.   He does have some DJD on a CAT scan abdominal which was incidental.    History of Lyme which we discussed sarah

## 2019-09-11 ENCOUNTER — HOSPITAL ENCOUNTER (OUTPATIENT)
Dept: MRI IMAGING | Age: 69
End: 2019-09-11
Attending: PHYSICAL MEDICINE & REHABILITATION
Payer: MEDICARE

## 2019-09-11 ENCOUNTER — HOSPITAL ENCOUNTER (OUTPATIENT)
Dept: MRI IMAGING | Age: 69
Discharge: HOME | End: 2019-09-11
Attending: PHYSICAL MEDICINE & REHABILITATION
Payer: MEDICARE

## 2019-09-11 DIAGNOSIS — M48.062 SPINAL STENOSIS OF LUMBAR REGION WITH NEUROGENIC CLAUDICATION: ICD-10-CM

## 2019-09-11 DIAGNOSIS — M70.62 GREATER TROCHANTERIC BURSITIS OF LEFT HIP: ICD-10-CM

## 2019-09-11 PROCEDURE — 72148 MRI LUMBAR SPINE W/O DYE: CPT | Performed by: PHYSICAL MEDICINE & REHABILITATION

## 2019-09-11 PROCEDURE — 73721 MRI JNT OF LWR EXTRE W/O DYE: CPT | Performed by: PHYSICAL MEDICINE & REHABILITATION

## 2019-09-23 ENCOUNTER — OFFICE VISIT (OUTPATIENT)
Dept: SURGERY | Facility: CLINIC | Age: 69
End: 2019-09-23
Payer: MEDICARE

## 2019-09-23 VITALS
HEIGHT: 71 IN | DIASTOLIC BLOOD PRESSURE: 78 MMHG | HEART RATE: 64 BPM | BODY MASS INDEX: 33.6 KG/M2 | SYSTOLIC BLOOD PRESSURE: 132 MMHG | WEIGHT: 240 LBS

## 2019-09-23 DIAGNOSIS — M48.061 SPINAL STENOSIS OF LUMBAR REGION, UNSPECIFIED WHETHER NEUROGENIC CLAUDICATION PRESENT: Primary | ICD-10-CM

## 2019-09-23 DIAGNOSIS — M47.9 SPONDYLOSIS: ICD-10-CM

## 2019-09-23 DIAGNOSIS — M54.16 LUMBAR RADICULOPATHY: ICD-10-CM

## 2019-09-23 DIAGNOSIS — G95.9 CERVICAL MYELOPATHY (HCC): ICD-10-CM

## 2019-09-23 PROCEDURE — 99203 OFFICE O/P NEW LOW 30 MIN: CPT | Performed by: PHYSICIAN ASSISTANT

## 2019-09-23 NOTE — PROGRESS NOTES
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 He has had physical therapy over-the-counter NSAIDs and is had a Medrol Dosepak.     He does have an appointment to see our pain service in a couple of days to discuss lumb member; hep c in his brother. SOCIAL HISTORY:   reports that he has never smoked. He has never used smokeless tobacco. He reports that he drinks alcohol. He reports that he does not use drugs.     ALLERGIES:    Gramineae Pollens       OTHER (SEE COMMENTS back pain on flexion-extension. Flexion-extension lateral bending does not aggravate his radiculopathy. Mildly positive cross straight leg raise on the right for left leg radiculopathy. Mildly positive straight leg raise with full extension on the left. the pain service. He is given no medications today he feels he can tolerate it with just Tylenol for now    Images were reviewed his questions were answered.     Leti Jimenez M.S., HANK  [place] [id number] [facility], [address]

## 2019-09-23 NOTE — PATIENT INSTRUCTIONS
Refill policies:    • Allow 2-3 business days for refills; controlled substances may take longer.   • Contact your pharmacy at least 5 days prior to running out of medication and have them send an electronic request or submit request through the “request re Depending on your insurance carrier, approval may take 3-10 days. It is highly recommended patients contact their insurance carrier directly to determine coverage.   If test is done without insurance authorization, patient may be responsible for the entire MRI of the cervical and x-ray of the cervical spine  4. Limit rotation of the lumbar spine and lumbar extension for now  5. We will hold on a prednisone taper until he sees the pain service.   He is given no medications today he feels he can tolerate it w

## 2019-09-23 NOTE — PROGRESS NOTES
MRI lumbar spine complete 9/11/19    Left leg pain. Pt here for evaluation. Pt states pain is over left hip and below left knee. Pt is not having issues with numbness or tingling, pt not having issues with gait or balance.       Pt states pain occurs fir

## 2019-09-25 ENCOUNTER — OFFICE VISIT (OUTPATIENT)
Dept: PAIN CLINIC | Facility: CLINIC | Age: 69
End: 2019-09-25
Payer: MEDICARE

## 2019-09-25 ENCOUNTER — TELEPHONE (OUTPATIENT)
Dept: PAIN CLINIC | Facility: CLINIC | Age: 69
End: 2019-09-25

## 2019-09-25 VITALS
BODY MASS INDEX: 33.6 KG/M2 | HEIGHT: 71 IN | OXYGEN SATURATION: 92 % | SYSTOLIC BLOOD PRESSURE: 100 MMHG | HEART RATE: 72 BPM | WEIGHT: 240 LBS | DIASTOLIC BLOOD PRESSURE: 50 MMHG

## 2019-09-25 DIAGNOSIS — M71.38 SYNOVIAL CYST OF LUMBAR FACET JOINT: ICD-10-CM

## 2019-09-25 DIAGNOSIS — M54.16 LUMBAR RADICULOPATHY: Primary | ICD-10-CM

## 2019-09-25 DIAGNOSIS — M54.16 LUMBAR RADICULITIS: Primary | ICD-10-CM

## 2019-09-25 PROCEDURE — 99203 OFFICE O/P NEW LOW 30 MIN: CPT | Performed by: ANESTHESIOLOGY

## 2019-09-25 NOTE — PROGRESS NOTES
Spoke with patient and scheduled injection(s). Reviewed pre-op instructions. Patient verbalized understanding, and agreeable to holding ASA 81mg for 24 hours and Fish Oil for 5 days prior to procedure.  Patient prefers conscious sedation, reviewed fasting a

## 2019-09-25 NOTE — PATIENT INSTRUCTIONS
Refill policies:    • Allow 2-3 business days for refills; controlled substances may take longer.   • Contact your pharmacy at least 5 days prior to running out of medication and have them send an electronic request or submit request through the “request re Depending on your insurance carrier, approval may take 3-10 days. It is highly recommended patients contact their insurance carrier directly to determine coverage.   If test is done without insurance authorization, patient may be responsible for the entire 10/01/2019   Check-In Time: 10:45am    ** TO AVOID CANCELLATION AND/OR RESCHEDULING: PLEASE CALL [facility] PRE-PROCEDURE LINE -[phone] FOR DETAILED INSTRUCTIONS FIVE TO SEVEN DAYS PRIOR TO PROCEDURE**        Prior to the procedure:  Please update us prior medications:  • Aggrenox 10 days   • Agrylin (Anagrelide) 10 days   • Enbrel (Etanercept) 24 hours   • Fragmin (Dalteparin) 24 hours   • Pletal (Cilostazol) 7 days  • Effient (Prasugrel) 7 days  • Pradaxa 10 days  • Trental 7 days  • Eliquis (Apixaban) 3 d increase in your blood sugar. Contact your primary care physician if your blood sugar rises as you may require some medication adjustment.         It is normal to have increased pain at injection site for up to 3-5 days after procedure, you can        use

## 2019-09-25 NOTE — TELEPHONE ENCOUNTER
Patient evaluated by Dr. Nia Burk and recommended for:    Left Transforaminal at L4-5, L5-S1 with Cyst Aspiriation    Please place case request with conscious sedation.

## 2019-09-25 NOTE — PROGRESS NOTES
PHYSICAL EXAM:   Physical Exam   Constitutional:           Patient presents in office today with reported pain in left hip, left knee    Current pain level reported = 2/10    MRI HIPS, LEFT (CPT=73721) 9/11/19    MRI SPINE LUMBAR (CPT=72148) 9/11/19    L

## 2019-09-27 ENCOUNTER — HOSPITAL ENCOUNTER (OUTPATIENT)
Dept: MRI IMAGING | Age: 69
Discharge: HOME | End: 2019-09-27
Attending: PHYSICIAN ASSISTANT
Payer: MEDICARE

## 2019-09-27 ENCOUNTER — TELEPHONE (OUTPATIENT)
Dept: SURGERY | Facility: CLINIC | Age: 69
End: 2019-09-27

## 2019-09-27 ENCOUNTER — HOSPITAL ENCOUNTER (OUTPATIENT)
Dept: GENERAL RADIOLOGY | Age: 69
Discharge: HOME | End: 2019-09-27
Attending: PHYSICIAN ASSISTANT
Payer: MEDICARE

## 2019-09-27 DIAGNOSIS — M47.9 SPONDYLOSIS: ICD-10-CM

## 2019-09-27 DIAGNOSIS — G95.9 CERVICAL MYELOPATHY (HCC): ICD-10-CM

## 2019-09-27 PROCEDURE — 72141 MRI NECK SPINE W/O DYE: CPT | Performed by: PHYSICIAN ASSISTANT

## 2019-09-27 PROCEDURE — 72052 X-RAY EXAM NECK SPINE 6/>VWS: CPT | Performed by: PHYSICIAN ASSISTANT

## 2019-09-27 NOTE — TELEPHONE ENCOUNTER
Spoke to patient, confirmed procedure date of 10-01-19 and to be checked in at outpatient registration at 10:45 am. Patient called pre-procedure line and understood instructions

## 2019-09-28 NOTE — PROGRESS NOTES
Name: Aram Stover   : 1/3/1950   DOS: 2019     Chief complaint: Low back pain    History of present illness:  Aram Stover is a 71year old male complaining of   pain in the lower back for 30 years.   Patient knew he has a extrude • Tinnitus 9/17/2010    low grade   • Urinary urgency 9/17/2010    mild   • Varicose veins 9/17/2010    and venous insufficiency   • Venous insufficiency 9/17/2010   • Wrist fracture, left 1997        Current Outpatient Medications:  Sertraline HCl 50 MG use: No      Review of  other systems:  Constitutional: negative for fever, weight loss and malaise/fatigue  Cardiovascular:  Denies shortness of breath, chest pain, dyspnea on exertion, ischemia in extremities. Endocrine: Negative.  Specifically denies th Examination:    (R)   (L)  Deltoid:      5    5  Biceps:       5    5  Triceps:      5    5  Wrist Extension:     5    5  Wrist Flexsion:                 5    5  Finger Extension:     5    5  Finger Flexsion:     5    5    Deep Tendon Reflexes: N                          N   L2:      N     N   L3:      N               N   L4:      N                          N   L5:      N                          N    S1:      N                          N   S3:      N                          N    Radiology diagn

## 2019-10-01 ENCOUNTER — APPOINTMENT (OUTPATIENT)
Dept: GENERAL RADIOLOGY | Facility: HOSPITAL | Age: 69
End: 2019-10-01
Attending: ANESTHESIOLOGY
Payer: MEDICARE

## 2019-10-01 ENCOUNTER — HOSPITAL ENCOUNTER (OUTPATIENT)
Facility: HOSPITAL | Age: 69
Setting detail: HOSPITAL OUTPATIENT SURGERY
Discharge: HOME | End: 2019-10-01
Attending: ANESTHESIOLOGY | Admitting: ANESTHESIOLOGY
Payer: MEDICARE

## 2019-10-01 VITALS
TEMPERATURE: 98 F | HEART RATE: 59 BPM | OXYGEN SATURATION: 96 % | SYSTOLIC BLOOD PRESSURE: 125 MMHG | DIASTOLIC BLOOD PRESSURE: 61 MMHG | RESPIRATION RATE: 18 BRPM

## 2019-10-01 DIAGNOSIS — M54.16 LUMBAR RADICULITIS: ICD-10-CM

## 2019-10-01 DIAGNOSIS — M71.38 SYNOVIAL CYST OF LUMBAR FACET JOINT: ICD-10-CM

## 2019-10-01 PROCEDURE — 99152 MOD SED SAME PHYS/QHP 5/>YRS: CPT | Performed by: ANESTHESIOLOGY

## 2019-10-01 PROCEDURE — 3E0R33Z INTRODUCTION OF ANTI-INFLAMMATORY INTO SPINAL CANAL, PERCUTANEOUS APPROACH: ICD-10-PCS | Performed by: ANESTHESIOLOGY

## 2019-10-01 PROCEDURE — 3E0R3BZ INTRODUCTION OF ANESTHETIC AGENT INTO SPINAL CANAL, PERCUTANEOUS APPROACH: ICD-10-PCS | Performed by: ANESTHESIOLOGY

## 2019-10-01 RX ORDER — DEXAMETHASONE SODIUM PHOSPHATE 10 MG/ML
INJECTION, SOLUTION INTRAMUSCULAR; INTRAVENOUS AS NEEDED
Status: DISCONTINUED | OUTPATIENT
Start: 2019-10-01 | End: 2019-10-01

## 2019-10-01 RX ORDER — ONDANSETRON 2 MG/ML
4 INJECTION INTRAMUSCULAR; INTRAVENOUS ONCE AS NEEDED
Status: DISCONTINUED | OUTPATIENT
Start: 2019-10-01 | End: 2019-10-01

## 2019-10-01 RX ORDER — SODIUM CHLORIDE, SODIUM LACTATE, POTASSIUM CHLORIDE, CALCIUM CHLORIDE 600; 310; 30; 20 MG/100ML; MG/100ML; MG/100ML; MG/100ML
100 INJECTION, SOLUTION INTRAVENOUS CONTINUOUS
Status: DISCONTINUED | OUTPATIENT
Start: 2019-10-01 | End: 2019-10-01

## 2019-10-01 RX ORDER — DIPHENHYDRAMINE HYDROCHLORIDE 50 MG/ML
50 INJECTION INTRAMUSCULAR; INTRAVENOUS ONCE AS NEEDED
Status: CANCELLED | OUTPATIENT
Start: 2019-10-01 | End: 2019-10-01

## 2019-10-01 RX ORDER — MIDAZOLAM HYDROCHLORIDE 1 MG/ML
INJECTION INTRAMUSCULAR; INTRAVENOUS AS NEEDED
Status: DISCONTINUED | OUTPATIENT
Start: 2019-10-01 | End: 2019-10-01

## 2019-10-01 RX ORDER — LIDOCAINE HYDROCHLORIDE 10 MG/ML
INJECTION, SOLUTION EPIDURAL; INFILTRATION; INTRACAUDAL; PERINEURAL AS NEEDED
Status: DISCONTINUED | OUTPATIENT
Start: 2019-10-01 | End: 2019-10-01

## 2019-10-01 RX ORDER — ONDANSETRON 2 MG/ML
4 INJECTION INTRAMUSCULAR; INTRAVENOUS ONCE AS NEEDED
Status: CANCELLED | OUTPATIENT
Start: 2019-10-01 | End: 2019-10-01

## 2019-10-01 NOTE — H&P
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  Grasses trees dust doing well on daily Zyrtec.     • [place] measles    • Hemorrhoids    • History of hepatitis    • History of influenza    • Hyperglycemia 9/17/2010   • Kidney stone 8/19/2011   • Lipid screening 9/13/2010   • Measles    • Mononu UROGENITAL [x ] [x ]    EXTREMITIES [x ] [x ]    OTHER        [ x ] I have discussed the risks and benefits and alternatives with the patient/family. They understand and agree to proceed with plan of care.   [ x ] I have reviewed the History and Physical

## 2019-10-02 NOTE — OPERATIVE REPORT
[facility]  Operative Report  10/1/2019     Radha Monson Patient Status:  Hospital Outpatient Surgery    1/3/1950 MRN [id number]   [facility] ENDOSCOPY Attending No att. providers found   Hosp Day # 0 PCP Karen Cooper MD fluoroscopy. The skin and subcutaneous tissue was anesthetized via 25-gauge 1.5 inch needle with approximately 2 mL of 1% lidocaine.   A 22-gauge 5 inch Quincke spinal needle was introduced toward the inferior aspect of the junction between the transverse

## 2019-10-10 ENCOUNTER — TELEPHONE (OUTPATIENT)
Dept: SURGERY | Facility: CLINIC | Age: 69
End: 2019-10-10

## 2019-10-10 ENCOUNTER — OFFICE VISIT (OUTPATIENT)
Dept: SURGERY | Facility: CLINIC | Age: 69
End: 2019-10-10
Payer: MEDICARE

## 2019-10-10 VITALS — SYSTOLIC BLOOD PRESSURE: 122 MMHG | HEART RATE: 70 BPM | DIASTOLIC BLOOD PRESSURE: 68 MMHG

## 2019-10-10 DIAGNOSIS — M54.16 LUMBAR RADICULOPATHY: ICD-10-CM

## 2019-10-10 DIAGNOSIS — M47.9 SPONDYLOSIS: ICD-10-CM

## 2019-10-10 DIAGNOSIS — G95.9 CERVICAL MYELOPATHY (HCC): ICD-10-CM

## 2019-10-10 DIAGNOSIS — M48.061 SPINAL STENOSIS OF LUMBAR REGION, UNSPECIFIED WHETHER NEUROGENIC CLAUDICATION PRESENT: Primary | ICD-10-CM

## 2019-10-10 PROCEDURE — 99213 OFFICE O/P EST LOW 20 MIN: CPT | Performed by: PHYSICIAN ASSISTANT

## 2019-10-10 RX ORDER — GABAPENTIN 100 MG/1
100 CAPSULE ORAL 3 TIMES DAILY
Qty: 90 CAPSULE | Refills: 0 | Status: SHIPPED | OUTPATIENT
Start: 2019-10-10 | End: 2019-11-04

## 2019-10-10 NOTE — PATIENT INSTRUCTIONS
Refill policies:    • Allow 2-3 business days for refills; controlled substances may take longer.   • Contact your pharmacy at least 5 days prior to running out of medication and have them send an electronic request or submit request through the “request re Depending on your insurance carrier, approval may take 3-10 days. It is highly recommended patients contact their insurance carrier directly to determine coverage.   If test is done without insurance authorization, patient may be responsible for the entire early November. Orders placed  2. Gabapentin 100mg TID  3.  Call with changes

## 2019-10-10 NOTE — PROGRESS NOTES
Pt is here for follow up for SX Discussion:     Imaging:   MRI of the cervical   Xray of the cervical     Injection:     LEFT TRANSFORAMINAL LUMBAR EPIDURAL STEROID AND CYST ASPIRATION WITH CONSCIOUS SEDATION  10/1/19 Dr Thalia Venegas. 0% of relief/     Pt states t

## 2019-10-10 NOTE — PROGRESS NOTES
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 Left lateral leg pain worse when he wakes up, then after a period of time it improves and is tolerable.  It lateral calf pain which can go from a 2–10/10. He may have a slight numbness but is unsure if he has any numbness in the left leg. He denies any pain down into the foot on the left leg he denies any right leg numbness tingling or pain.   He denies any wea Rylie   • TRANSFORAMINAL LUMBAR EPIDURAL STEROID INJECTION MULTIPLE LEVEL Left 10/1/2019    Performed by Klaudia Billingsley MD at [facility] ENDOSCOPY       FAMILY HISTORY:  family history includes Cancer in his brother; Diabetes in his mother; Glaucoma in his fa EXAMINATION:  GENERAL:  Patient is in no acute distress. HEENT:  Normocephalic, atraumatic  SKIN: Warm, dry, no rashes. NEUROLOGICAL:  This patient is alert and orientated x 3. Speech fluent. Comprehension intact.   Face is symmetrical.    SPINE: No ne posteriorly to the L5 vertebral body L5-S1 severe central stenosis and foraminal stenosis    MRI of the left hip 9/11/2019 mild left hip osteoarthritis      ASSESSMENT:  1. L4-5 L5-S1 severe spinal stenosis, cyst with left leg radiculopathy  2.   Lumbar sp

## 2019-10-11 NOTE — TELEPHONE ENCOUNTER
Patient informed Gage Russell is booking out until 1/2020. Gage Russell was informed that sooner dates are needed. Preferably before Thanksgiving.     Informed patient Gage Russell was informed so as soon as dates are received from Gage Russell we will be giving patient a

## 2019-10-14 ENCOUNTER — TELEPHONE (OUTPATIENT)
Dept: SURGERY | Facility: CLINIC | Age: 69
End: 2019-10-14

## 2019-10-18 ENCOUNTER — TELEPHONE (OUTPATIENT)
Dept: SURGERY | Facility: CLINIC | Age: 69
End: 2019-10-18

## 2019-10-18 NOTE — TELEPHONE ENCOUNTER
You are scheduled for LEFT LUMBAR 4-5, LUMBAR 5-SACRAL 1 MICROFORAMINOTOMIES AND FACET CYST RESECTION on 10/28/19 with Danitza York.     Pre-op instructions discussed with patient and surgical packet provided:    · You will need to contact the Pre-admission dep instructions about when to resume the medication before you are discharged from the hospital  · Post operative appointment to be made 2 and 6 weeks after surgery.      When speaking with Pre-admissions you will be told about a spine class as it relates to y

## 2019-10-18 NOTE — TELEPHONE ENCOUNTER
Scheduled patient for surgery. Patient wanting to know if he can get pain medication in case he needs it prior to surgery. He is currently only taking Tylenol, 1000 mg. States he has never taken Tramadol.       Informed patient message will be forwar

## 2019-10-19 ENCOUNTER — LABORATORY ENCOUNTER (OUTPATIENT)
Dept: LAB | Facility: HOSPITAL | Age: 69
End: 2019-10-19
Attending: NEUROLOGICAL SURGERY
Payer: MEDICARE

## 2019-10-19 DIAGNOSIS — M48.061 SPINAL STENOSIS OF LUMBAR REGION, UNSPECIFIED WHETHER NEUROGENIC CLAUDICATION PRESENT: ICD-10-CM

## 2019-10-19 PROCEDURE — 87081 CULTURE SCREEN ONLY: CPT

## 2019-10-19 PROCEDURE — 80053 COMPREHEN METABOLIC PANEL: CPT

## 2019-10-19 PROCEDURE — 36415 COLL VENOUS BLD VENIPUNCTURE: CPT

## 2019-10-19 PROCEDURE — 85610 PROTHROMBIN TIME: CPT

## 2019-10-19 PROCEDURE — 85025 COMPLETE CBC W/AUTO DIFF WBC: CPT

## 2019-10-19 PROCEDURE — 85730 THROMBOPLASTIN TIME PARTIAL: CPT

## 2019-10-21 ENCOUNTER — TELEPHONE (OUTPATIENT)
Dept: SURGERY | Facility: CLINIC | Age: 69
End: 2019-10-21

## 2019-10-21 ENCOUNTER — OFFICE VISIT (OUTPATIENT)
Dept: INTERNAL MEDICINE CLINIC | Facility: CLINIC | Age: 69
End: 2019-10-21
Payer: MEDICARE

## 2019-10-21 VITALS
HEART RATE: 64 BPM | DIASTOLIC BLOOD PRESSURE: 60 MMHG | SYSTOLIC BLOOD PRESSURE: 120 MMHG | BODY MASS INDEX: 33.6 KG/M2 | TEMPERATURE: 98 F | RESPIRATION RATE: 14 BRPM | HEIGHT: 71 IN | OXYGEN SATURATION: 94 % | WEIGHT: 240 LBS

## 2019-10-21 DIAGNOSIS — N13.8 BPH WITH OBSTRUCTION/LOWER URINARY TRACT SYMPTOMS: ICD-10-CM

## 2019-10-21 DIAGNOSIS — M47.812 CERVICAL SPINE ARTHRITIS: ICD-10-CM

## 2019-10-21 DIAGNOSIS — G47.33 OSA (OBSTRUCTIVE SLEEP APNEA): ICD-10-CM

## 2019-10-21 DIAGNOSIS — Z83.3 FH: DIABETES MELLITUS: ICD-10-CM

## 2019-10-21 DIAGNOSIS — R93.1 AGATSTON CAC SCORE, <100: ICD-10-CM

## 2019-10-21 DIAGNOSIS — E88.81 INSULIN RESISTANCE: ICD-10-CM

## 2019-10-21 DIAGNOSIS — M71.38 SYNOVIAL CYST OF LUMBAR SPINE: ICD-10-CM

## 2019-10-21 DIAGNOSIS — E78.5 ELEVATED LIPIDS: ICD-10-CM

## 2019-10-21 DIAGNOSIS — N40.1 BPH WITH OBSTRUCTION/LOWER URINARY TRACT SYMPTOMS: ICD-10-CM

## 2019-10-21 PROBLEM — B60.00 BABESIOSIS: Status: RESOLVED | Noted: 2018-11-14 | Resolved: 2019-10-21

## 2019-10-21 PROBLEM — A69.20 LYME DISEASE: Status: RESOLVED | Noted: 2018-11-14 | Resolved: 2019-10-21

## 2019-10-21 PROBLEM — D50.0 BLOOD LOSS ANEMIA: Status: RESOLVED | Noted: 2018-11-04 | Resolved: 2019-10-21

## 2019-10-21 PROCEDURE — 99214 OFFICE O/P EST MOD 30 MIN: CPT | Performed by: INTERNAL MEDICINE

## 2019-10-21 NOTE — PROGRESS NOTES
Patient presents with:  Pre-Op Exam      HPI: Patient presents with lumbar pain some radicular component work-up shows cyst lumbar spine area patient will be taken to surgery by Dr. Renetta Hunt -for exploratory and resolution of the cyst micro dynamic [employer]   Constitutional: Negative for fever, chills and fatigue. No distress. HENT: Negative for hearing loss, congestion, sore throat, neck pain and dental problem. Eyes: Negative for pain and visual disturbance.      Respiratory: Negative for cou 0  Sertraline HCl 50 MG Oral Tab, Take 1 tablet (50 mg total) by mouth daily. , Disp: 90 tablet, Rfl: 3  Dutasteride 0.5 MG Oral Cap, Take 1 capsule (0.5 mg total) by mouth nightly., Disp: 90 capsule, Rfl: 3  tamsulosin HCl 0.4 MG Oral Cap, Take 2 capsules nerve deficit or sensory deficit. Normal muscle tone. Coordination normal. Gait noted. :  Normal no retention findings I have checked his prostate in the past    Skin: Skin is warm and dry. No rash noted. No erythema. No pallor.      Psychiatric: Norm

## 2019-10-21 NOTE — TELEPHONE ENCOUNTER
pt scheduled for sx on 10/28. He is requesting pain medication for the interim. He states the pain is getting progressively worse.

## 2019-10-22 ENCOUNTER — TELEPHONE (OUTPATIENT)
Dept: INTERNAL MEDICINE CLINIC | Facility: CLINIC | Age: 69
End: 2019-10-22

## 2019-10-22 RX ORDER — HYDROCODONE BITARTRATE AND ACETAMINOPHEN 5; 325 MG/1; MG/1
1 TABLET ORAL EVERY 8 HOURS PRN
Qty: 30 TABLET | Refills: 0 | Status: ON HOLD | OUTPATIENT
Start: 2019-10-22 | End: 2019-10-29

## 2019-10-22 NOTE — TELEPHONE ENCOUNTER
Per PCP- on 10/21/19:\"Patient is cleared for surgery I will follow him in the hospital day of surgery and day after. \"    Nothing further needed for upcoming surgery.

## 2019-10-23 ENCOUNTER — HOSPITAL ENCOUNTER (OUTPATIENT)
Dept: PHYSICAL THERAPY | Facility: HOSPITAL | Age: 69
Discharge: HOME | End: 2019-10-23
Attending: NEUROLOGICAL SURGERY
Payer: MEDICARE

## 2019-10-23 DIAGNOSIS — M48.061 SPINAL STENOSIS OF LUMBAR REGION, UNSPECIFIED WHETHER NEUROGENIC CLAUDICATION PRESENT: ICD-10-CM

## 2019-10-28 ENCOUNTER — HOSPITAL ENCOUNTER (INPATIENT)
Facility: HOSPITAL | Age: 69
LOS: 1 days | Discharge: HOME | DRG: 516 | End: 2019-10-29
Attending: NEUROLOGICAL SURGERY | Admitting: NEUROLOGICAL SURGERY
Payer: MEDICARE

## 2019-10-28 ENCOUNTER — APPOINTMENT (OUTPATIENT)
Dept: GENERAL RADIOLOGY | Facility: HOSPITAL | Age: 69
DRG: 516 | End: 2019-10-28
Attending: NEUROLOGICAL SURGERY
Payer: MEDICARE

## 2019-10-28 ENCOUNTER — ANESTHESIA EVENT (OUTPATIENT)
Dept: SURGERY | Facility: HOSPITAL | Age: 69
DRG: 516 | End: 2019-10-28
Payer: MEDICARE

## 2019-10-28 ENCOUNTER — ANESTHESIA (OUTPATIENT)
Dept: SURGERY | Facility: HOSPITAL | Age: 69
DRG: 516 | End: 2019-10-28
Payer: MEDICARE

## 2019-10-28 DIAGNOSIS — M48.061 SPINAL STENOSIS OF LUMBAR REGION, UNSPECIFIED WHETHER NEUROGENIC CLAUDICATION PRESENT: Primary | ICD-10-CM

## 2019-10-28 DIAGNOSIS — M54.16 LUMBAR RADICULOPATHY: ICD-10-CM

## 2019-10-28 DIAGNOSIS — M47.9 SPONDYLOSIS: ICD-10-CM

## 2019-10-28 PROBLEM — G95.89 LUMBAR EPIDURAL MASS (HCC): Status: ACTIVE | Noted: 2019-10-28

## 2019-10-28 PROCEDURE — 99232 SBSQ HOSP IP/OBS MODERATE 35: CPT | Performed by: INTERNAL MEDICINE

## 2019-10-28 PROCEDURE — 72020 X-RAY EXAM OF SPINE 1 VIEW: CPT | Performed by: NEUROLOGICAL SURGERY

## 2019-10-28 PROCEDURE — 01NB0ZZ RELEASE LUMBAR NERVE, OPEN APPROACH: ICD-10-PCS | Performed by: NEUROLOGICAL SURGERY

## 2019-10-28 RX ORDER — HYDROMORPHONE HYDROCHLORIDE 1 MG/ML
0.8 INJECTION, SOLUTION INTRAMUSCULAR; INTRAVENOUS; SUBCUTANEOUS EVERY 2 HOUR PRN
Status: DISCONTINUED | OUTPATIENT
Start: 2019-10-28 | End: 2019-10-29

## 2019-10-28 RX ORDER — DIPHENHYDRAMINE HCL 25 MG
25 CAPSULE ORAL EVERY 4 HOURS PRN
Status: DISCONTINUED | OUTPATIENT
Start: 2019-10-28 | End: 2019-10-29

## 2019-10-28 RX ORDER — HYDROMORPHONE HYDROCHLORIDE 1 MG/ML
0.4 INJECTION, SOLUTION INTRAMUSCULAR; INTRAVENOUS; SUBCUTANEOUS EVERY 2 HOUR PRN
Status: DISCONTINUED | OUTPATIENT
Start: 2019-10-28 | End: 2019-10-29

## 2019-10-28 RX ORDER — SODIUM CHLORIDE, SODIUM LACTATE, POTASSIUM CHLORIDE, CALCIUM CHLORIDE 600; 310; 30; 20 MG/100ML; MG/100ML; MG/100ML; MG/100ML
INJECTION, SOLUTION INTRAVENOUS CONTINUOUS
Status: DISCONTINUED | OUTPATIENT
Start: 2019-10-28 | End: 2019-10-28 | Stop reason: HOSPADM

## 2019-10-28 RX ORDER — HYDROMORPHONE HYDROCHLORIDE 1 MG/ML
0.2 INJECTION, SOLUTION INTRAMUSCULAR; INTRAVENOUS; SUBCUTANEOUS EVERY 2 HOUR PRN
Status: DISCONTINUED | OUTPATIENT
Start: 2019-10-28 | End: 2019-10-29

## 2019-10-28 RX ORDER — BACITRACIN 50000 [USP'U]/1
INJECTION, POWDER, LYOPHILIZED, FOR SOLUTION INTRAMUSCULAR AS NEEDED
Status: DISCONTINUED | OUTPATIENT
Start: 2019-10-28 | End: 2019-10-28 | Stop reason: HOSPADM

## 2019-10-28 RX ORDER — ACETAMINOPHEN 325 MG/1
650 TABLET ORAL EVERY 4 HOURS PRN
Status: DISCONTINUED | OUTPATIENT
Start: 2019-10-28 | End: 2019-10-29

## 2019-10-28 RX ORDER — SODIUM PHOSPHATE, DIBASIC AND SODIUM PHOSPHATE, MONOBASIC 7; 19 G/133ML; G/133ML
1 ENEMA RECTAL ONCE AS NEEDED
Status: DISCONTINUED | OUTPATIENT
Start: 2019-10-28 | End: 2019-10-29

## 2019-10-28 RX ORDER — TAMSULOSIN HYDROCHLORIDE 0.4 MG/1
0.8 CAPSULE ORAL DAILY
Status: DISCONTINUED | OUTPATIENT
Start: 2019-10-29 | End: 2019-10-29

## 2019-10-28 RX ORDER — GABAPENTIN 100 MG/1
100 CAPSULE ORAL 3 TIMES DAILY
Status: DISCONTINUED | OUTPATIENT
Start: 2019-10-28 | End: 2019-10-28

## 2019-10-28 RX ORDER — ONDANSETRON 2 MG/ML
4 INJECTION INTRAMUSCULAR; INTRAVENOUS EVERY 4 HOURS PRN
Status: DISCONTINUED | OUTPATIENT
Start: 2019-10-28 | End: 2019-10-29

## 2019-10-28 RX ORDER — NALOXONE HYDROCHLORIDE 0.4 MG/ML
80 INJECTION, SOLUTION INTRAMUSCULAR; INTRAVENOUS; SUBCUTANEOUS AS NEEDED
Status: DISCONTINUED | OUTPATIENT
Start: 2019-10-28 | End: 2019-10-28 | Stop reason: HOSPADM

## 2019-10-28 RX ORDER — ORPHENADRINE CITRATE 30 MG/ML
30 INJECTION INTRAMUSCULAR; INTRAVENOUS EVERY 6 HOURS
Status: DISCONTINUED | OUTPATIENT
Start: 2019-10-28 | End: 2019-10-29

## 2019-10-28 RX ORDER — GABAPENTIN 300 MG/1
300 CAPSULE ORAL NIGHTLY
Status: DISCONTINUED | OUTPATIENT
Start: 2019-10-28 | End: 2019-10-29

## 2019-10-28 RX ORDER — CEFAZOLIN SODIUM/WATER 2 G/20 ML
2 SYRINGE (ML) INTRAVENOUS ONCE
Status: COMPLETED | OUTPATIENT
Start: 2019-10-28 | End: 2019-10-28

## 2019-10-28 RX ORDER — DIAZEPAM 5 MG/1
5 TABLET ORAL EVERY 6 HOURS PRN
Status: DISCONTINUED | OUTPATIENT
Start: 2019-10-28 | End: 2019-10-29

## 2019-10-28 RX ORDER — DIPHENHYDRAMINE HYDROCHLORIDE 50 MG/ML
25 INJECTION INTRAMUSCULAR; INTRAVENOUS EVERY 4 HOURS PRN
Status: DISCONTINUED | OUTPATIENT
Start: 2019-10-28 | End: 2019-10-29

## 2019-10-28 RX ORDER — ROSUVASTATIN CALCIUM 20 MG/1
20 TABLET, COATED ORAL NIGHTLY
COMMUNITY
End: 2020-04-06

## 2019-10-28 RX ORDER — SODIUM CHLORIDE 9 MG/ML
INJECTION, SOLUTION INTRAVENOUS CONTINUOUS
Status: DISCONTINUED | OUTPATIENT
Start: 2019-10-28 | End: 2019-10-29

## 2019-10-28 RX ORDER — SENNOSIDES 8.6 MG
17.2 TABLET ORAL NIGHTLY
Status: DISCONTINUED | OUTPATIENT
Start: 2019-10-28 | End: 2019-10-29

## 2019-10-28 RX ORDER — HYDROCODONE BITARTRATE AND ACETAMINOPHEN 5; 325 MG/1; MG/1
2 TABLET ORAL EVERY 4 HOURS PRN
Status: DISCONTINUED | OUTPATIENT
Start: 2019-10-28 | End: 2019-10-29

## 2019-10-28 RX ORDER — ONDANSETRON 2 MG/ML
4 INJECTION INTRAMUSCULAR; INTRAVENOUS AS NEEDED
Status: DISCONTINUED | OUTPATIENT
Start: 2019-10-28 | End: 2019-10-28 | Stop reason: HOSPADM

## 2019-10-28 RX ORDER — BISACODYL 10 MG
10 SUPPOSITORY, RECTAL RECTAL
Status: DISCONTINUED | OUTPATIENT
Start: 2019-10-28 | End: 2019-10-29

## 2019-10-28 RX ORDER — HYDROCODONE BITARTRATE AND ACETAMINOPHEN 5; 325 MG/1; MG/1
1 TABLET ORAL EVERY 4 HOURS PRN
Status: DISCONTINUED | OUTPATIENT
Start: 2019-10-28 | End: 2019-10-29

## 2019-10-28 RX ORDER — METOCLOPRAMIDE HYDROCHLORIDE 5 MG/ML
10 INJECTION INTRAMUSCULAR; INTRAVENOUS EVERY 6 HOURS PRN
Status: DISCONTINUED | OUTPATIENT
Start: 2019-10-28 | End: 2019-10-29

## 2019-10-28 RX ORDER — GABAPENTIN 300 MG/1
300 CAPSULE ORAL DAILY
Status: DISCONTINUED | OUTPATIENT
Start: 2019-10-29 | End: 2019-10-28

## 2019-10-28 RX ORDER — BUPIVACAINE HYDROCHLORIDE AND EPINEPHRINE 5; 5 MG/ML; UG/ML
INJECTION, SOLUTION EPIDURAL; INTRACAUDAL; PERINEURAL AS NEEDED
Status: DISCONTINUED | OUTPATIENT
Start: 2019-10-28 | End: 2019-10-28 | Stop reason: HOSPADM

## 2019-10-28 RX ORDER — DOCUSATE SODIUM 100 MG/1
100 CAPSULE, LIQUID FILLED ORAL 2 TIMES DAILY
Status: DISCONTINUED | OUTPATIENT
Start: 2019-10-28 | End: 2019-10-29

## 2019-10-28 RX ORDER — POLYETHYLENE GLYCOL 3350 17 G/17G
17 POWDER, FOR SOLUTION ORAL DAILY PRN
Status: DISCONTINUED | OUTPATIENT
Start: 2019-10-28 | End: 2019-10-29

## 2019-10-28 RX ORDER — CEFAZOLIN SODIUM/WATER 2 G/20 ML
2 SYRINGE (ML) INTRAVENOUS EVERY 8 HOURS
Status: COMPLETED | OUTPATIENT
Start: 2019-10-28 | End: 2019-10-29

## 2019-10-28 RX ORDER — CEFAZOLIN SODIUM/WATER 2 G/20 ML
SYRINGE (ML) INTRAVENOUS
Status: DISPENSED
Start: 2019-10-28 | End: 2019-10-29

## 2019-10-28 RX ORDER — ACETAMINOPHEN 500 MG
1000 TABLET ORAL ONCE
Status: DISCONTINUED | OUTPATIENT
Start: 2019-10-28 | End: 2019-10-28 | Stop reason: HOSPADM

## 2019-10-28 RX ORDER — HYDROMORPHONE HYDROCHLORIDE 1 MG/ML
0.4 INJECTION, SOLUTION INTRAMUSCULAR; INTRAVENOUS; SUBCUTANEOUS EVERY 5 MIN PRN
Status: DISCONTINUED | OUTPATIENT
Start: 2019-10-28 | End: 2019-10-28 | Stop reason: HOSPADM

## 2019-10-28 NOTE — H&P
RUBÉN [place]   Physician Assistant   Physician Assistant   Progress Notes      Signed   Encounter Date:  10/10/2019               Signed                [facility]   Follow up        HISTORY OF PRESENT ILLNESS:Boo Monique Call is X72 He denies neck pain but does have decreased range of motion and some stiffness in the neck. Couple of years ago he had right-sided trapezius tightness which went away. He denies any radiculopathy in the arms.   He denies any numbness or tingling in the ar   progressive pigmented dermatosis in lower extremities-relatively stable   • Rosacea 9/17/2010   • Seasonal allergies       spring and fall, and environmental allergies   • Thrombocytopenia ([facility] [address] 11/4/2018   • Tinnitus 9/17/2010     low grade   • Urinary Cholecalciferol (VITAMIN D) 2000 UNITS Oral Cap Take  by mouth. Disp:  Rfl:    aspirin 81 MG Oral Chew Tab Chew 81 mg by mouth daily. Disp:  Rfl:    Omega-3-acid Ethyl Esters (LOVAZA) 1 G Oral Cap Take 1 g by mouth daily.  Disp:  Rfl:    psyllium (METAMUCIL    Iliopsoas  Hamstrings   Quads    D-flexion P-flexion Eversion   Right       5         5       5         5 5 5   Left       5         5       5         5 5 5      Reflexes DTRs:      Biceps   Brachioradialis   Triceps    Patellar    Ankle  Hamstring   Ri • BPH (benign prostatic hypertrophy) 9/17/2010   • Cataract    • Chickenpox    • CRP elevated 2009   • Dizziness and giddiness 8/19/2011    and lightheaded   • Environmental allergies 12/28/2015    Positive I CAP test.  Grasses trees dust doing well on radha • Thyroid Disorder Other         hypothyroidism, child   • Glaucoma Other         sibling     The above referenced H&P was reviewed by Alaina Alberto PA-C on 10/28/19, the patient was examined and no significant changes have occurred in the patient's con

## 2019-10-28 NOTE — PROGRESS NOTES
[facility]  Progress Note    Keegan Villasenor Patient Status:  Outpatient in a Bed    1/3/1950 MRN [id number]   [facility] SURGERY Attending Johann Walden MD   Hosp Day # 0 PCP Qian Escudero MD       Assessment: #1 lumbar s Skin: Negative. Neurological: Negative. No specific weakness in legs although on examination left great toe mild weakness on extension per neurosurgery   Endo/Heme/Allergies: Negative. Psychiatric/Behavioral: Negative.           Intake/Outpu Approved by: Agnieszka Bliss MD on 10/28/2019 at 16:12              DVT Prophylaxis: Early ambulation    Estimated date of discharge: October 29, 2019    At this point Mr. Jazmin Whitten is expected to be discharge to: home    Questions/concerns were discusse

## 2019-10-28 NOTE — ANESTHESIA PREPROCEDURE EVALUATION
PRE-OP EVALUATION    Patient Name: Danny Clinton    Pre-op Diagnosis: Spinal stenosis of lumbar region, unspecified whether neurogenic claudication present [M48.061]  Lumbar radiculopathy [M54.16]  Spondylosis [M47.9]    Procedure(s):  LEFT LUMBAR mouth nightly., Disp: 90 capsule, Rfl: 3  tamsulosin HCl 0.4 MG Oral Cap, Take 2 capsules (0.8 mg total) by mouth daily. , Disp: 180 capsule, Rfl: 3  Cholecalciferol (VITAMIN D OR), Take 1 capsule by mouth daily.   , Disp: , Rfl:   psyllium (METAMUCIL SMOOTH 10/19/2019    BUN 23 (H) 10/19/2019    CREATSERUM 1.14 10/19/2019     (H) 10/19/2019    CA 9.3 10/19/2019     Lab Results   Component Value Date    INR 0.90 10/19/2019         Airway      Mallampati: III  Mouth opening: >3 FB  TM distance: > 6 cm  N

## 2019-10-29 VITALS
SYSTOLIC BLOOD PRESSURE: 149 MMHG | TEMPERATURE: 98 F | RESPIRATION RATE: 15 BRPM | DIASTOLIC BLOOD PRESSURE: 75 MMHG | HEART RATE: 68 BPM | WEIGHT: 246.5 LBS | HEIGHT: 71 IN | OXYGEN SATURATION: 97 % | BODY MASS INDEX: 34.51 KG/M2

## 2019-10-29 PROBLEM — R21 RASH: Status: ACTIVE | Noted: 2019-10-29

## 2019-10-29 PROCEDURE — 99232 SBSQ HOSP IP/OBS MODERATE 35: CPT | Performed by: INTERNAL MEDICINE

## 2019-10-29 RX ORDER — CYCLOBENZAPRINE HCL 5 MG
5 TABLET ORAL 3 TIMES DAILY PRN
Status: DISCONTINUED | OUTPATIENT
Start: 2019-10-29 | End: 2019-10-29

## 2019-10-29 RX ORDER — ASPIRIN 81 MG/1
81 TABLET, CHEWABLE ORAL DAILY
Qty: 30 TABLET | Refills: 0 | Status: SHIPPED | OUTPATIENT
Start: 2019-11-02 | End: 2019-10-29

## 2019-10-29 RX ORDER — POLYETHYLENE GLYCOL 3350 17 G/17G
17 POWDER, FOR SOLUTION ORAL DAILY PRN
Qty: 1 EACH | Refills: 0 | Status: SHIPPED | COMMUNITY
Start: 2019-10-29 | End: 2019-11-04 | Stop reason: ALTCHOICE

## 2019-10-29 RX ORDER — CYCLOBENZAPRINE HCL 5 MG
5 TABLET ORAL 3 TIMES DAILY PRN
Qty: 60 TABLET | Refills: 0 | Status: SHIPPED | OUTPATIENT
Start: 2019-10-29 | End: 2019-11-04 | Stop reason: ALTCHOICE

## 2019-10-29 RX ORDER — HYDROCODONE BITARTRATE AND ACETAMINOPHEN 5; 325 MG/1; MG/1
1-2 TABLET ORAL EVERY 4 HOURS PRN
Qty: 60 TABLET | Refills: 0 | Status: SHIPPED | OUTPATIENT
Start: 2019-10-29 | End: 2019-11-04 | Stop reason: ALTCHOICE

## 2019-10-29 NOTE — PLAN OF CARE
A&Ox4, on RA. , tele, SCDs bilaterally, IS encouraged. Vital signs stable. Denies numbness/tingling. Neurovascular assessment WDL. Good pulses, sensation, strength, warm extremities.  Niño d/c this AM. Voided after niño removal. Pain controlled well wi

## 2019-10-29 NOTE — OCCUPATIONAL THERAPY NOTE
OCCUPATIONAL THERAPY QUICK EVALUATION - INPATIENT    Room Number: 386/386-A  Evaluation Date: 10/29/2019     Type of Evaluation: Quick Eval  Presenting Problem: s/p L4-S1 microforaminotomies and facet cyst resection 10/28/19    Physician Order: Ad Shrestha Consult fall, and environmental allergies   • Sleep apnea    • Thrombocytopenia ([facility]) 11/4/2018   • Tinnitus 9/17/2010    low grade   • Urinary urgency 9/17/2010    mild   • Varicose veins 9/17/2010    and venous insufficiency   • Venous insufficiency 9/17/2010 Findings: None                ACTIVITY TOLERANCE                         O2 SATURATIONS  SPO2 on Room Air at Rest: 97             ACTIVITIES OF DAILY LIVING ASSESSMENT  AM-PAC ‘6-Clicks’ Inpatient Daily Activity Short Form   How much help from another pers old male admitted 10/28/2019 for L4-S1 microforaminotomies and facet cyst resection. Patient seen this am for OT evauation.  In this OT eval patient performed all ADL tasks, functional transfers and dynamic reaching safely, without loss of balance, and at s

## 2019-10-29 NOTE — BRIEF OP NOTE
Pre-Operative Diagnosis: Spinal stenosis of lumbar region, unspecified whether neurogenic claudication present [M48.061]  Lumbar radiculopathy [M54.16]  Spondylosis [M47.9]     Post-Operative Diagnosis: Spinal stenosis of lumbar region, unspecified whether

## 2019-10-29 NOTE — PLAN OF CARE
Received pt from PACU. IVF infusing, niño draining. Drsg lower back scant old drainage D/I. Schedule norflex provided. PRN pain meds. Pt c/o burning to chest redness noted R/L chest MD notified. Kenalog cream applied/ice to site. Improvement noted.  Monito

## 2019-10-29 NOTE — PROGRESS NOTES
Patient and  (wife) attended discharge spine education class. Printed discharge education sheet provided and reviewed. Teach back done. Questions solicited and answered. Tolerated activity well.

## 2019-10-29 NOTE — PHYSICAL THERAPY NOTE
PHYSICAL THERAPY QUICK EVALUATION - INPATIENT    Room Number: 386/386-A  Evaluation Date: 10/29/2019  Presenting Problem: s/p L4-S1 microforaminotomies, facet cyst removal 1/28/19  Physician Order: PT Eval and Treat    Problem List  Principal Problem: CYSTOSCOPY,INSERT URETERAL STENT      with biopsy, may 2019   • FOREARM/WRIST SURGERY UNLISTED  1997    L wrist fx, ORIF   • LUMBAR LAMINECTOMY 2 LEVEL N/A 10/28/2019    Performed by Carrie Diana MD at [facility] MAIN OR   • OTHER      debridment of L wrist 1 Moving from lying on back to sitting on the side of the bed?: None   How much help from another person does the patient currently need. ..   -   Moving to and from a bed to a chair (including a wheelchair)?: None   -   Need to walk in hospital room?: None educated in frequent position changes, cont of ankle pumps for DVT prevention, use of ice for surgical pain, and activity recommendations. Pt able to adhere to spine precautions with all activity, and demonstrates good understanding of above.     Based on th

## 2019-10-29 NOTE — PROGRESS NOTES
10/29/19 0612   Clinical Encounter Type   Visited With Patient   Patient's Supportive Strategies/Resources Father Kamilah Easley provided prayer, Scripture, support and Sacrament of the Sick.     Sacramental Encounters   Sacrament of Sick-Anointing Anointed

## 2019-10-29 NOTE — PROGRESS NOTES
[facility]  Neurosurgery Progress Note    Nivia Adkins Patient Status:  Outpatient in a Bed    1/3/1950 MRN [id number]   [facility] 3SW-A Attending Slim Alexander MD   Hosp Day # 0 PCP Lydiamirna Mejía MD     Chief Complain

## 2019-10-29 NOTE — PROGRESS NOTES
[facility]  Progress Note    Benjiman Dural Patient Status:  Inpatient    1/3/1950 MRN [id number]   [facility] 3SW-A Attending Jurgen Greenfield MD   Hosp Day # 1 PCP Leslye Gallagher MD       Assessment: Status post lumbar s leg swelling. Telemetry for ADOLPH stable   Gastrointestinal: Negative. Genitourinary: Negative for dysuria, flank pain, frequency and urgency. See above regarding BPH with obstructive symptoms reinitiation of Flomax.    Musculoskeletal: DVT     negative catheter out no suprapubic tenderness    Skin see mild pressure points upper chest area no exudative issues.   No breakdown, cellulitis    Vascular: No phlebitis please see above    Neurology: Moves all extremities and no confusion noted

## 2019-10-29 NOTE — RESPIRATORY THERAPY NOTE
ADOLPH - Equipment Use Daily Summary:                  . Set Mode: CPAP                . Usage in hours: 7:41                . 90% Pressure (EPAP) level: 8.0                . 90% Insp. Pressure (IPAP): Willa Hou AHI: 13.1                .  Supplemental O

## 2019-10-31 ENCOUNTER — TELEPHONE (OUTPATIENT)
Dept: INTERNAL MEDICINE CLINIC | Facility: CLINIC | Age: 69
End: 2019-10-31

## 2019-10-31 NOTE — OPERATIVE REPORT
[facility]    OPERATIVE REPORT    Patient:  Aram Stover;  YOB: 1950     CSN:  [id number]; Medical Record Number:  [id number]    Admission Date:  10/28/2019 Operation Date:  10/28/2019    . ..........................     Operating and dural sac were significantly decompressed with only some capsule left attached to the dura. Procedure: He was placed under general endotracheal anesthesia in the supine position on the transportation cart.   Sequential compression boots and Baptiste cat lateral gutter along the shoulder of the exiting nerve root revealed a thickened capsule of the cyst.  This was followed down to the floor of the canal at the level of the pedicle and then undermined.   It was lifted off the floor of the spinal canal and th of the canal to relieve his central and right-sided spinal stenosis even further. I then moved down to the L5-S1 level.   At this level I drilled the inferior aspect of the L5 lamina sparing a bridge in the middle and sparing the pars and facet complex at

## 2019-11-04 ENCOUNTER — OFFICE VISIT (OUTPATIENT)
Dept: INTERNAL MEDICINE CLINIC | Facility: CLINIC | Age: 69
End: 2019-11-04
Payer: MEDICARE

## 2019-11-04 VITALS
HEIGHT: 71 IN | TEMPERATURE: 98 F | BODY MASS INDEX: 33.6 KG/M2 | OXYGEN SATURATION: 93 % | RESPIRATION RATE: 12 BRPM | HEART RATE: 64 BPM | WEIGHT: 240 LBS | DIASTOLIC BLOOD PRESSURE: 60 MMHG | SYSTOLIC BLOOD PRESSURE: 118 MMHG

## 2019-11-04 DIAGNOSIS — G95.89 LUMBAR EPIDURAL MASS (HCC): ICD-10-CM

## 2019-11-04 DIAGNOSIS — R31.29 MICROSCOPIC HEMATURIA: ICD-10-CM

## 2019-11-04 DIAGNOSIS — M47.812 CERVICAL SPINE ARTHRITIS: ICD-10-CM

## 2019-11-04 PROCEDURE — 99496 TRANSJ CARE MGMT HIGH F2F 7D: CPT | Performed by: INTERNAL MEDICINE

## 2019-11-04 NOTE — PROGRESS NOTES
Patient presents with:  Post-Op      HPI: Symptomatic radicular lumbar cyst failed epidural was taken to surgery pain was quite significant. Operated on 6 days ago. Postoperatively no impaction but was able to stop MiraLAX for loose stools.     He has n Negative for confusion, dizziness, syncope, weakness, numbness, tingling and headaches. Strength back    Hematological: Negative for adenopathy. Does not bruise/bleed easily.      Psychiatric/Behavioral: No aggravation of mood disorder the patient is not n Normal rate, regular rhythm and intact distal pulses. No murmur, rubs or gallops. No  JVD      Pulmonary/Chest: Effort normal and breath sounds normal. No respiratory distress. No wheezes, rhonchi or rales    Abdominal: Soft.  Bowel sounds are normal. Non

## 2019-11-12 ENCOUNTER — OFFICE VISIT (OUTPATIENT)
Dept: SURGERY | Facility: CLINIC | Age: 69
End: 2019-11-12
Payer: MEDICARE

## 2019-11-12 VITALS — DIASTOLIC BLOOD PRESSURE: 72 MMHG | SYSTOLIC BLOOD PRESSURE: 128 MMHG | HEART RATE: 60 BPM

## 2019-11-12 DIAGNOSIS — Z98.890 POST-OPERATIVE STATE: ICD-10-CM

## 2019-11-12 DIAGNOSIS — Z98.890 S/P SPINAL SURGERY: Primary | ICD-10-CM

## 2019-11-12 PROCEDURE — 99024 POSTOP FOLLOW-UP VISIT: CPT | Performed by: PHYSICIAN ASSISTANT

## 2019-11-12 NOTE — PROGRESS NOTES
Patient here for 2 week post op visit. Patient stating his pain has \"completely resolved\". There is almost no discomfort at surgical site. Patient not needing to take pain meds currently.

## 2019-11-12 NOTE — PROGRESS NOTES
Patient: Sarah Glez  Medical Record Number: [id number]  YOB: 1950  PCP: Marylee Buffy, MD    Reason for visit: S/p spine surgery, 2 week post op visit    HISTORY OF CHIEF COMPLAINT:    Sarah Glez is a very pleasant 71 Wrist fracture, left 1997      Past Surgical History:   Procedure Laterality Date   • CATARACT  11/2012   • CYSTOSCOPY,INSERT URETERAL STENT      with biopsy, may 2019   • FOREARM/WRIST SURGERY UNLISTED  1997    L wrist fx, ORIF   • LUMBAR LAMINECTOMY 2 LE Outpatient Medications   Medication Sig Dispense Refill   • Rosuvastatin Calcium 20 MG Oral Tab Take 20 mg by mouth nightly. • Sertraline HCl 50 MG Oral Tab Take 1 tablet (50 mg total) by mouth daily.  90 tablet 3   • Dutasteride 0.5 MG Oral Cap Take 1 Post-operative state    PLAN:   1. Medication: None prescribed  2. Imaging:    - Reviewed today:    - No recent imaging   - Ordered today:    - None  3.  Activity:    - Gradually increase activity level   - Incision:    - Dab hydrogen peroxide twice daily,

## 2019-11-25 ENCOUNTER — PATIENT MESSAGE (OUTPATIENT)
Dept: SURGERY | Facility: CLINIC | Age: 69
End: 2019-11-25

## 2019-12-19 ENCOUNTER — OFFICE VISIT (OUTPATIENT)
Dept: SURGERY | Facility: CLINIC | Age: 69
End: 2019-12-19
Payer: MEDICARE

## 2019-12-19 VITALS — SYSTOLIC BLOOD PRESSURE: 106 MMHG | HEART RATE: 68 BPM | DIASTOLIC BLOOD PRESSURE: 62 MMHG

## 2019-12-19 DIAGNOSIS — M48.02 FORAMINAL STENOSIS OF CERVICAL REGION: ICD-10-CM

## 2019-12-19 DIAGNOSIS — Z98.890 POST-OPERATIVE STATE: ICD-10-CM

## 2019-12-19 DIAGNOSIS — M47.812 FACET ARTHRITIS OF CERVICAL REGION: ICD-10-CM

## 2019-12-19 DIAGNOSIS — Z98.890 S/P SPINAL SURGERY: Primary | ICD-10-CM

## 2019-12-19 DIAGNOSIS — M48.02 MYELOPATHY CONCURRENT WITH AND DUE TO SPINAL STENOSIS OF CERVICAL REGION (HCC): ICD-10-CM

## 2019-12-19 DIAGNOSIS — M54.2 NECK PAIN: ICD-10-CM

## 2019-12-19 DIAGNOSIS — M50.30 DEGENERATIVE DISC DISEASE, CERVICAL: ICD-10-CM

## 2019-12-19 DIAGNOSIS — G99.2 MYELOPATHY CONCURRENT WITH AND DUE TO SPINAL STENOSIS OF CERVICAL REGION (HCC): ICD-10-CM

## 2019-12-19 PROCEDURE — 99024 POSTOP FOLLOW-UP VISIT: CPT | Performed by: PHYSICIAN ASSISTANT

## 2019-12-19 NOTE — PROGRESS NOTES
Patient: Rod Swenson  Medical Record Number: [id number]  YOB: 1950  PCP: Mónica Manriquez MD    Reason for visit: Lumbar follow up, 6 week post op visit     HISTORY OF CHIEF COMPLAINT:    Rod Swenson is a very pleasant 71 dermatosis in lower extremities-relatively stable   • Rosacea 9/17/2010   • Seasonal allergies     spring and fall, and environmental allergies   • Sleep apnea    • [facility] 11/[facility]/2018   • Tinnitus 9/17/2010    low grade   • Urinary urgency 9/ less        Drinks per session: 1 or 2        Binge frequency: Never        Comment: rarely      Drug use: No    Other Topics      Concerns:        Caffeine Concern: Yes        Exercise: Yes       Gramineae Pollens       OTHER (SEE COMMENTS)    Comment:Als No recent imaging    MEDICAL DECISION MAKING:     ASSESSMENT and PLAN:  (D27.294) S/P spinal surgery  (primary encounter diagnosis)  Comment: S/p left L4-5, L5-S1 microforaminotomies and facet cyst resection with microscope dissection, Dr. Tawanda Wilson, 10/28/1

## 2019-12-19 NOTE — PROGRESS NOTES
Pt is here for post op  LEFT LUMBAR 4-LUMBAR 5, LUMBAR 5-SACRAL 1 MICROFORAMINOTOMIES AND FACET CYST RESECTION WITH MICROSCOPE DISSECTION  11/28/19     Pt states that he has been doing great since LOV.

## 2020-01-27 ENCOUNTER — TELEPHONE (OUTPATIENT)
Dept: SURGERY | Facility: CLINIC | Age: 70
End: 2020-01-27

## 2020-01-27 NOTE — TELEPHONE ENCOUNTER
Rcvd fax from [facility] Initial Examination [facility] 1/24/20. Endorsed to provider for signature.

## 2020-01-29 NOTE — TELEPHONE ENCOUNTER
Requesting Sertraline   LOV: 12/6/18 cpe   Last Relevant Labs: n/a   Filled: 2/14/19 #90 with 3 refills    Future Appointments   Date Time Provider Jodie Lopez   2/12/2020 10:00 AM [facility] 24 [facility] 24 [facility] [place]   2/25/2020  2:00 PM Madyson Hagen,

## 2020-02-12 ENCOUNTER — NURSE ONLY (OUTPATIENT)
Dept: INTERNAL MEDICINE CLINIC | Facility: CLINIC | Age: 70
End: 2020-02-12
Payer: MEDICARE

## 2020-02-12 DIAGNOSIS — Z00.00 ROUTINE GENERAL MEDICAL EXAMINATION AT A HEALTH CARE FACILITY: Primary | ICD-10-CM

## 2020-02-12 DIAGNOSIS — N20.0 KIDNEY STONE: ICD-10-CM

## 2020-02-12 LAB
AMB EXT GLUCOSE: 105 MG/DL
AMB EXT HGBA1C: 5.5 %
AMB EXT PSA SCREEN: 0.43 NG/ML
BILIRUB UR QL STRIP.AUTO: NEGATIVE
CLARITY UR REFRACT.AUTO: CLEAR
COLOR UR AUTO: YELLOW
CREAT UR-SCNC: 230 MG/DL
GLUCOSE UR STRIP.AUTO-MCNC: NEGATIVE MG/DL
HYALINE CASTS #/AREA URNS AUTO: PRESENT /LPF
KETONES UR STRIP.AUTO-MCNC: NEGATIVE MG/DL
MICROALBUMIN UR-MCNC: 5.58 MG/DL
MICROALBUMIN/CREAT 24H UR-RTO: 24.3 UG/MG (ref ?–30)
NITRITE UR QL STRIP.AUTO: NEGATIVE
PH UR STRIP.AUTO: 5 [PH] (ref 4.5–8)
PROT UR STRIP.AUTO-MCNC: NEGATIVE MG/DL
RBC #/AREA URNS AUTO: >10 /HPF
SP GR UR STRIP.AUTO: 1.02 (ref 1–1.03)
UROBILINOGEN UR STRIP.AUTO-MCNC: <2 MG/DL

## 2020-02-12 PROCEDURE — 82570 ASSAY OF URINE CREATININE: CPT | Performed by: INTERNAL MEDICINE

## 2020-02-12 PROCEDURE — 92551 PURE TONE HEARING TEST AIR: CPT | Performed by: INTERNAL MEDICINE

## 2020-02-12 PROCEDURE — 82043 UR ALBUMIN QUANTITATIVE: CPT | Performed by: INTERNAL MEDICINE

## 2020-02-12 PROCEDURE — 81001 URINALYSIS AUTO W/SCOPE: CPT | Performed by: INTERNAL MEDICINE

## 2020-02-12 PROCEDURE — 94010 BREATHING CAPACITY TEST: CPT | Performed by: INTERNAL MEDICINE

## 2020-02-12 PROCEDURE — 99173 VISUAL ACUITY SCREEN: CPT | Performed by: INTERNAL MEDICINE

## 2020-02-12 PROCEDURE — 93000 ELECTROCARDIOGRAM COMPLETE: CPT | Performed by: INTERNAL MEDICINE

## 2020-02-12 NOTE — PROGRESS NOTES
*LOOKIN' BODY RESULTS    YES:       NO:  x     REASON TEST NOT PERFORMED:        HEIGHT: 5'9.5  WEIGHT:250 lb  _____________________________________________________________________________  *VENIPUNCTURE    YES:  x     NO:        REASON VENIPUNCTURE NOT PE

## 2020-02-12 NOTE — PROGRESS NOTES
Will review at CPX patient has chronic hematuria followed closely by Dr. La Mckinney no need to call

## 2020-02-21 ENCOUNTER — MED REC SCAN ONLY (OUTPATIENT)
Dept: INTERNAL MEDICINE CLINIC | Facility: CLINIC | Age: 70
End: 2020-02-21

## 2020-02-21 NOTE — PROGRESS NOTES
HEALTH MAINTENANCE:      Immunization History   Administered Date(s) Administered   • DTAP 12/15/2014   • FLU VACC High Dose 65 YRS & Older PRSV Free (37468) 11/06/2017, 10/29/2018, 10/11/2019   • FLUAD High Dose 72 yr and older (69703) 11/06/2017   • Fluv Left Ear @ 40dBHL - 4000 Hz: Yes Right Ear @ 40 dBHL - 4000 Hz:  Yes

## 2020-02-25 ENCOUNTER — OFFICE VISIT (OUTPATIENT)
Dept: INTERNAL MEDICINE CLINIC | Facility: CLINIC | Age: 70
End: 2020-02-25
Payer: MEDICARE

## 2020-02-25 ENCOUNTER — OFFICE VISIT (OUTPATIENT)
Dept: PODIATRY CLINIC | Facility: CLINIC | Age: 70
End: 2020-02-25
Payer: MEDICARE

## 2020-02-25 VITALS
OXYGEN SATURATION: 93 % | HEART RATE: 60 BPM | RESPIRATION RATE: 12 BRPM | HEIGHT: 71 IN | WEIGHT: 253 LBS | DIASTOLIC BLOOD PRESSURE: 65 MMHG | SYSTOLIC BLOOD PRESSURE: 122 MMHG | BODY MASS INDEX: 35.42 KG/M2 | TEMPERATURE: 98 F

## 2020-02-25 DIAGNOSIS — F39 MOOD DISORDER (HCC): ICD-10-CM

## 2020-02-25 DIAGNOSIS — R31.29 MICROSCOPIC HEMATURIA: ICD-10-CM

## 2020-02-25 DIAGNOSIS — M48.02 MYELOPATHY CONCURRENT WITH AND DUE TO SPINAL STENOSIS OF CERVICAL REGION (HCC): ICD-10-CM

## 2020-02-25 DIAGNOSIS — E88.81 INSULIN RESISTANCE: ICD-10-CM

## 2020-02-25 DIAGNOSIS — M47.812 CERVICAL SPINE ARTHRITIS: ICD-10-CM

## 2020-02-25 DIAGNOSIS — R93.1 AGATSTON CAC SCORE, <100: ICD-10-CM

## 2020-02-25 DIAGNOSIS — N40.1 BPH WITH OBSTRUCTION/LOWER URINARY TRACT SYMPTOMS: ICD-10-CM

## 2020-02-25 DIAGNOSIS — L71.9 ROSACEA: ICD-10-CM

## 2020-02-25 DIAGNOSIS — G99.2 MYELOPATHY CONCURRENT WITH AND DUE TO SPINAL STENOSIS OF CERVICAL REGION (HCC): ICD-10-CM

## 2020-02-25 DIAGNOSIS — Z83.3 FH: DIABETES MELLITUS: ICD-10-CM

## 2020-02-25 DIAGNOSIS — G47.33 OSA (OBSTRUCTIVE SLEEP APNEA): ICD-10-CM

## 2020-02-25 DIAGNOSIS — E78.5 ELEVATED LIPIDS: ICD-10-CM

## 2020-02-25 DIAGNOSIS — L60.0 INGROWN TOENAIL OF RIGHT FOOT: Primary | ICD-10-CM

## 2020-02-25 DIAGNOSIS — N13.8 BPH WITH OBSTRUCTION/LOWER URINARY TRACT SYMPTOMS: ICD-10-CM

## 2020-02-25 DIAGNOSIS — Z98.890 S/P SPINAL SURGERY: ICD-10-CM

## 2020-02-25 DIAGNOSIS — R21 RASH: ICD-10-CM

## 2020-02-25 DIAGNOSIS — I65.23 ATHEROSCLEROSIS OF BOTH CAROTID ARTERIES: ICD-10-CM

## 2020-02-25 DIAGNOSIS — L57.0 ACTINIC KERATOSIS: ICD-10-CM

## 2020-02-25 DIAGNOSIS — Z00.00 ROUTINE GENERAL MEDICAL EXAMINATION AT A HEALTH CARE FACILITY: Primary | ICD-10-CM

## 2020-02-25 DIAGNOSIS — M47.812 FACET ARTHRITIS OF CERVICAL REGION: ICD-10-CM

## 2020-02-25 DIAGNOSIS — N20.0 KIDNEY STONE: ICD-10-CM

## 2020-02-25 DIAGNOSIS — G95.89 LUMBAR EPIDURAL MASS (HCC): ICD-10-CM

## 2020-02-25 PROBLEM — K76.0 FATTY LIVER: Status: ACTIVE | Noted: 2020-02-25

## 2020-02-25 PROCEDURE — G0439 PPPS, SUBSEQ VISIT: HCPCS | Performed by: INTERNAL MEDICINE

## 2020-02-25 RX ORDER — ZALEPLON 10 MG/1
10 CAPSULE ORAL NIGHTLY
Qty: 8 CAPSULE | Refills: 0 | Status: SHIPPED | OUTPATIENT
Start: 2020-02-25 | End: 2020-06-27

## 2020-02-25 RX ORDER — ZOLPIDEM TARTRATE 10 MG/1
10 TABLET ORAL NIGHTLY
Qty: 8 TABLET | Refills: 3 | Status: SHIPPED | OUTPATIENT
Start: 2020-02-25 | End: 2020-06-27

## 2020-02-25 NOTE — PROGRESS NOTES
Jorge Ortiz is a 79year old male. Patient presents with:  Consult: right 2nd toe pain -- No xrays taken. Onset yesterday. States may have been an ingrown toenail due to trimming toenail. No redness or swelling or drainage.  Denies any pain right Lipid screening 9/13/2010   • Lyme disease 11/14/2018   • Measles    • Mononucleosis    • MRSA infection 1997    L wrist fracture complicated my MRSA   • Mumps    • Nocturia 9/17/2010   • Overweight(278.02) 9/17/2010   • Progressive pigmentary dermatosis 4 History    Socioeconomic History      Marital status:       Spouse name: Not on file      Number of children: Not on file      Years of education: Not on file      Highest education level: Not on file    Tobacco Use      Smoking status: Never Smoker PRN    The patient indicates understanding of these issues and agrees to the plan. Return if symptoms worsen or fail to improve.     Carlin Crowe DPM  2/25/2020

## 2020-02-25 NOTE — PROGRESS NOTES
HPI:    Patient ID: Meggan Arnold is a 79year old male. HPI DEAR Boo    IT WAS NICE SEEING YOU FOR YOUR WELLNESS VISIT ON 2/25/2020           Review of Systems    HPI:    Patient ID: Meggan Arnold is a 79year old male.     History of P activities?: 0-No     Have you had any memory issues?: 0-No    Fall/Risk Scorin          PHQ-4: Over the LAST 2 WEEKS               Little interest or pleasure in doing things (over the last two weeks)?: Not at all    Feeling down, depressed, or hopele disturbance. Respiratory: Negative for cough, chest tightness, shortness of breath and wheezing. No active smoking. Cardiovascular: Negative for chest pain, palpitations, syncope, and edema. No symptoms of heart failure.     Gastrointestinal: Regino Rogers Weight: 253 lb (114.8 kg)   Height: 71\"     Body mass index is 35.29 kg/m².     Active Problems:  Patient Active Problem List:     Actinic keratosis     Kidney stone     Rosacea     BPH with obstruction/lower urinary tract symptoms     BMI 33.0-33.9,adul venous insufficiency   • Venous insufficiency 9/17/2010   • Wrist fracture, left 1997       Past Surgical History:  Past Surgical History:   Procedure Laterality Date   • CATARACT  11/2012   • CYSTOSCOPY,DIL Alison Pope  05/2019   • Marycarmen Benitez tobacco: Never Used    Substance and Sexual Activity      Alcohol use: Yes        Frequency: Monthly or less        Drinks per session: 1 or 2        Binge frequency: Never        Comment: rarely      Drug use: No      Sexual activity: Not on file    Lifes scleral icterus. Visual Acuity                           Neck: Normal range of motion. Neck supple. Normal carotid pulses no bruits. No JVD present. No edema present. No mass and no thyromegaly present.      Cardiovascular: Normal rate, regular rhythm an but has not changed    No evidence of oxidation LDL normal CRP is normal    Current cholesterol 133 LDL 71 last heart test was 0 probably should repeat mild carotid plaque bilaterally    We did review concept of lipoprotein's lipoprotein a is 73 I am very Exam           ASSESSMENT/PLAN:   Routine general medical examination at a health care facility  (primary encounter diagnosis)  Myelopathy concurrent with and due to spinal stenosis of cervical region (hcc)  Facet arthritis of cervical region (hcc)  Mood d

## 2020-02-27 ENCOUNTER — OFFICE VISIT (OUTPATIENT)
Dept: INTERNAL MEDICINE CLINIC | Facility: CLINIC | Age: 70
End: 2020-02-27
Payer: MEDICARE

## 2020-02-27 VITALS
OXYGEN SATURATION: 95 % | DIASTOLIC BLOOD PRESSURE: 65 MMHG | TEMPERATURE: 98 F | HEART RATE: 65 BPM | SYSTOLIC BLOOD PRESSURE: 128 MMHG | RESPIRATION RATE: 14 BRPM

## 2020-02-27 DIAGNOSIS — L57.0 ACTINIC KERATOSIS: ICD-10-CM

## 2020-02-27 DIAGNOSIS — E78.5 ELEVATED LIPIDS: ICD-10-CM

## 2020-02-27 DIAGNOSIS — R31.0 GROSS HEMATURIA: ICD-10-CM

## 2020-02-27 DIAGNOSIS — I65.23 ATHEROSCLEROSIS OF BOTH CAROTID ARTERIES: ICD-10-CM

## 2020-02-27 DIAGNOSIS — R31.29 MICROSCOPIC HEMATURIA: ICD-10-CM

## 2020-02-27 DIAGNOSIS — N13.8 BPH WITH OBSTRUCTION/LOWER URINARY TRACT SYMPTOMS: ICD-10-CM

## 2020-02-27 DIAGNOSIS — E88.81 INSULIN RESISTANCE: ICD-10-CM

## 2020-02-27 DIAGNOSIS — K76.0 FATTY LIVER: ICD-10-CM

## 2020-02-27 DIAGNOSIS — Z00.00 ROUTINE GENERAL MEDICAL EXAMINATION AT A HEALTH CARE FACILITY: Primary | ICD-10-CM

## 2020-02-27 DIAGNOSIS — L71.9 ROSACEA: ICD-10-CM

## 2020-02-27 DIAGNOSIS — N20.0 KIDNEY STONE: ICD-10-CM

## 2020-02-27 DIAGNOSIS — Z98.890 S/P SPINAL SURGERY: ICD-10-CM

## 2020-02-27 DIAGNOSIS — F39 MOOD DISORDER (HCC): ICD-10-CM

## 2020-02-27 DIAGNOSIS — R93.1 AGATSTON CAC SCORE, <100: ICD-10-CM

## 2020-02-27 DIAGNOSIS — M47.812 CERVICAL SPINE ARTHRITIS: ICD-10-CM

## 2020-02-27 DIAGNOSIS — N40.1 BPH WITH OBSTRUCTION/LOWER URINARY TRACT SYMPTOMS: ICD-10-CM

## 2020-02-27 NOTE — PROGRESS NOTES
HPI:    Patient ID: Marion Smith is a 79year old male. HPI DEAR Boo    IT WAS NICE SEEING YOU FOR YOUR WELLNESS VISIT ON 2/28/2020           Review of Systems   HPI:    Patient ID: Marion Smith is a 79year old male.     History of Pr failure. Gastrointestinal: Negative for nausea, vomiting, abdominal pain, diarrhea, blood in stool and abdominal distention. Denies GERD. No current liver disorder. No constipation. Colonoscopy December 2014 repeat 2024.     Genitourinary: Does have so with obstruction/lower urinary tract symptoms     Microscopic hematuria     Elevated lipids     Carotid artery plaque     FH: diabetes mellitus     Agatston CAC score, <100     Cervical spine arthritis     Insulin resistance     Hematuria     ADOLPH (obstruct CATARACT  11/2012   • CYSTOSCOPY,DIL BLADDER,LOCAL ANESTH  05/2019   • CYSTOSCOPY,INSERT URETERAL STENT      with biopsy, may 2019   • FOREARM/WRIST SURGERY UNLISTED  1997    L wrist fx, ORIF   • LAMINECTOMY,LUMBAR  10/2019   • LUMBAR LAMINECTOMY 2 LEVEL N Drinks per session: 1 or 2        Binge frequency: Never        Comment: rarely      Drug use: No      Sexual activity: Not on file    Lifestyle      Physical activity:        Days per week: Not on file        Minutes per session: Not on file      Stress: Cardiovascular: Normal rate, regular rhythm and intact distal pulses. No murmur, rubs or gallops. Pulmonary/Chest: Effort normal and breath sounds normal. No respiratory distress. No wheezes, rhonchi or rales. No cyanosis and no clubbing. diet.            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     Last Ultrafast CT was in 2014 let us pursue that if that achiness      #4. Synovial cyst lumbar back just getting back into full swing. Hopefully looking return in 2 your golf lessons      #5. Given ongoing hematuria Dr. Dana Zuniga multiple evaluations in the past and pending      #6.    Mood disorder well contro

## 2020-02-28 ENCOUNTER — TELEPHONE (OUTPATIENT)
Dept: INTERNAL MEDICINE CLINIC | Facility: CLINIC | Age: 70
End: 2020-02-28

## 2020-02-28 DIAGNOSIS — Z13.6 SCREENING FOR HEART DISEASE: Primary | ICD-10-CM

## 2020-02-28 NOTE — TELEPHONE ENCOUNTER
Per Dr. Jeronimo Rea: order heart scan. Notified patient via [employer]. Will hold to confirm that patient has read the message.

## 2020-04-06 RX ORDER — ROSUVASTATIN CALCIUM 20 MG/1
TABLET, COATED ORAL
Qty: 90 TABLET | Refills: 3 | Status: SHIPPED | OUTPATIENT
Start: 2020-04-06 | End: 2020-10-09

## 2020-04-06 RX ORDER — DUTASTERIDE 0.5 MG/1
CAPSULE, LIQUID FILLED ORAL
Qty: 90 CAPSULE | Refills: 3 | Status: SHIPPED | OUTPATIENT
Start: 2020-04-06 | End: 2020-10-09

## 2020-04-06 RX ORDER — TAMSULOSIN HYDROCHLORIDE 0.4 MG/1
CAPSULE ORAL
Qty: 180 CAPSULE | Refills: 3 | Status: SHIPPED | OUTPATIENT
Start: 2020-04-06 | End: 2020-10-09

## 2020-04-17 ENCOUNTER — VIRTUAL PHONE E/M (OUTPATIENT)
Dept: INTERNAL MEDICINE CLINIC | Facility: CLINIC | Age: 70
End: 2020-04-17
Payer: MEDICARE

## 2020-04-17 DIAGNOSIS — N13.8 BPH WITH OBSTRUCTION/LOWER URINARY TRACT SYMPTOMS: ICD-10-CM

## 2020-04-17 DIAGNOSIS — N40.1 BPH WITH OBSTRUCTION/LOWER URINARY TRACT SYMPTOMS: ICD-10-CM

## 2020-04-17 DIAGNOSIS — E88.81 INSULIN RESISTANCE: ICD-10-CM

## 2020-04-17 DIAGNOSIS — R31.29 MICROSCOPIC HEMATURIA: Primary | ICD-10-CM

## 2020-04-17 PROCEDURE — 99442 PHONE E/M BY PHYS 11-20 MIN: CPT | Performed by: INTERNAL MEDICINE

## 2020-04-17 NOTE — PROGRESS NOTES
Please note that the following visit was completed using two-way, real-time interactive audio and/or video communication.   This has been done in good dawson to provide continuity of care in the best interest of the provider-patient relationship, due to th

## 2020-05-11 ENCOUNTER — TELEPHONE (OUTPATIENT)
Dept: SURGERY | Facility: CLINIC | Age: 70
End: 2020-05-11

## 2020-05-11 DIAGNOSIS — R31.0 GROSS HEMATURIA: Primary | ICD-10-CM

## 2020-05-12 ENCOUNTER — LAB ENCOUNTER (OUTPATIENT)
Dept: LAB | Age: 70
End: 2020-05-12
Attending: UROLOGY
Payer: MEDICARE

## 2020-05-12 DIAGNOSIS — R31.0 GROSS HEMATURIA: ICD-10-CM

## 2020-05-12 PROCEDURE — 87086 URINE CULTURE/COLONY COUNT: CPT | Performed by: UROLOGY

## 2020-05-12 PROCEDURE — 88108 CYTOPATH CONCENTRATE TECH: CPT | Performed by: UROLOGY

## 2020-05-12 PROCEDURE — 81001 URINALYSIS AUTO W/SCOPE: CPT | Performed by: UROLOGY

## 2020-05-12 PROCEDURE — 80048 BASIC METABOLIC PNL TOTAL CA: CPT

## 2020-05-12 PROCEDURE — 85025 COMPLETE CBC W/AUTO DIFF WBC: CPT

## 2020-05-12 PROCEDURE — 36415 COLL VENOUS BLD VENIPUNCTURE: CPT

## 2020-05-13 ENCOUNTER — TELEPHONE (OUTPATIENT)
Dept: SURGERY | Facility: CLINIC | Age: 70
End: 2020-05-13

## 2020-05-13 NOTE — TELEPHONE ENCOUNTER
RN called patient to relay MD's message and recommendations \"Your recent urine culture is normal.  Recommend follow up in the office as directed.  \"     Patient has a follow up with MD on 5/15 Friday 8am. Patient verbalized understanding and agreeable to

## 2020-05-13 NOTE — TELEPHONE ENCOUNTER
Your recent urine culture is normal.  Recommend follow up in the office as directed.     Sincerely,  Jasmin Salgado MD

## 2020-05-14 ENCOUNTER — APPOINTMENT (OUTPATIENT)
Dept: CT IMAGING | Facility: HOSPITAL | Age: 70
End: 2020-05-14
Attending: EMERGENCY MEDICINE
Payer: MEDICARE

## 2020-05-14 ENCOUNTER — HOSPITAL ENCOUNTER (EMERGENCY)
Facility: HOSPITAL | Age: 70
Discharge: HOME | End: 2020-05-15
Attending: EMERGENCY MEDICINE
Payer: MEDICARE

## 2020-05-14 DIAGNOSIS — N13.30 HYDRONEPHROSIS, UNSPECIFIED HYDRONEPHROSIS TYPE: ICD-10-CM

## 2020-05-14 DIAGNOSIS — R31.0 GROSS HEMATURIA: Primary | ICD-10-CM

## 2020-05-14 DIAGNOSIS — R10.9 FLANK PAIN: ICD-10-CM

## 2020-05-14 PROCEDURE — 99284 EMERGENCY DEPT VISIT MOD MDM: CPT

## 2020-05-14 PROCEDURE — 80053 COMPREHEN METABOLIC PANEL: CPT | Performed by: EMERGENCY MEDICINE

## 2020-05-14 PROCEDURE — 96361 HYDRATE IV INFUSION ADD-ON: CPT

## 2020-05-14 PROCEDURE — 85025 COMPLETE CBC W/AUTO DIFF WBC: CPT | Performed by: EMERGENCY MEDICINE

## 2020-05-14 PROCEDURE — 99285 EMERGENCY DEPT VISIT HI MDM: CPT

## 2020-05-14 PROCEDURE — 81001 URINALYSIS AUTO W/SCOPE: CPT | Performed by: EMERGENCY MEDICINE

## 2020-05-14 PROCEDURE — 96376 TX/PRO/DX INJ SAME DRUG ADON: CPT

## 2020-05-14 PROCEDURE — 96375 TX/PRO/DX INJ NEW DRUG ADDON: CPT

## 2020-05-14 PROCEDURE — 96374 THER/PROPH/DIAG INJ IV PUSH: CPT

## 2020-05-14 PROCEDURE — 74176 CT ABD & PELVIS W/O CONTRAST: CPT | Performed by: EMERGENCY MEDICINE

## 2020-05-14 RX ORDER — SODIUM CHLORIDE 9 MG/ML
INJECTION, SOLUTION INTRAVENOUS CONTINUOUS
Status: DISCONTINUED | OUTPATIENT
Start: 2020-05-14 | End: 2020-05-15

## 2020-05-14 RX ORDER — HYDROMORPHONE HYDROCHLORIDE 1 MG/ML
INJECTION, SOLUTION INTRAMUSCULAR; INTRAVENOUS; SUBCUTANEOUS EVERY 30 MIN PRN
Status: DISCONTINUED | OUTPATIENT
Start: 2020-05-14 | End: 2020-05-15

## 2020-05-14 RX ORDER — KETOROLAC TROMETHAMINE 30 MG/ML
15 INJECTION, SOLUTION INTRAMUSCULAR; INTRAVENOUS ONCE
Status: COMPLETED | OUTPATIENT
Start: 2020-05-14 | End: 2020-05-14

## 2020-05-14 RX ORDER — ONDANSETRON 2 MG/ML
4 INJECTION INTRAMUSCULAR; INTRAVENOUS ONCE
Status: COMPLETED | OUTPATIENT
Start: 2020-05-14 | End: 2020-05-14

## 2020-05-15 ENCOUNTER — PROCEDURE (OUTPATIENT)
Dept: SURGERY | Facility: CLINIC | Age: 70
End: 2020-05-15
Payer: MEDICARE

## 2020-05-15 VITALS — HEART RATE: 88 BPM | SYSTOLIC BLOOD PRESSURE: 129 MMHG | TEMPERATURE: 98 F | DIASTOLIC BLOOD PRESSURE: 68 MMHG

## 2020-05-15 VITALS
BODY MASS INDEX: 33.6 KG/M2 | HEIGHT: 71 IN | OXYGEN SATURATION: 94 % | HEART RATE: 58 BPM | SYSTOLIC BLOOD PRESSURE: 133 MMHG | RESPIRATION RATE: 16 BRPM | DIASTOLIC BLOOD PRESSURE: 74 MMHG | TEMPERATURE: 98 F | WEIGHT: 240 LBS

## 2020-05-15 DIAGNOSIS — R10.9 ACUTE RIGHT FLANK PAIN: ICD-10-CM

## 2020-05-15 DIAGNOSIS — R11.0 NAUSEA: ICD-10-CM

## 2020-05-15 DIAGNOSIS — R31.1 BENIGN ESSENTIAL MICROSCOPIC HEMATURIA: Primary | ICD-10-CM

## 2020-05-15 DIAGNOSIS — R31.0 GROSS HEMATURIA: ICD-10-CM

## 2020-05-15 PROCEDURE — 81003 URINALYSIS AUTO W/O SCOPE: CPT | Performed by: UROLOGY

## 2020-05-15 RX ORDER — ONDANSETRON 4 MG/1
4 TABLET, ORALLY DISINTEGRATING ORAL EVERY 4 HOURS PRN
Qty: 20 TABLET | Refills: 0 | Status: SHIPPED | OUTPATIENT
Start: 2020-05-15 | End: 2020-05-22

## 2020-05-15 RX ORDER — KETOROLAC TROMETHAMINE 10 MG/1
10 TABLET, FILM COATED ORAL EVERY 6 HOURS PRN
Qty: 20 TABLET | Refills: 1 | Status: SHIPPED | OUTPATIENT
Start: 2020-05-15 | End: 2020-06-27

## 2020-05-15 RX ORDER — ONDANSETRON 8 MG/1
8 TABLET, ORALLY DISINTEGRATING ORAL ONCE
Status: DISCONTINUED | OUTPATIENT
Start: 2020-05-15 | End: 2020-06-27 | Stop reason: ALTCHOICE

## 2020-05-15 RX ORDER — HYDROCODONE BITARTRATE AND ACETAMINOPHEN 5; 325 MG/1; MG/1
1-2 TABLET ORAL EVERY 4 HOURS PRN
Qty: 20 TABLET | Refills: 0 | Status: SHIPPED | OUTPATIENT
Start: 2020-05-15 | End: 2020-05-25

## 2020-05-15 NOTE — ED INITIAL ASSESSMENT (HPI)
Patient complaining of right flank pain radiating to the groin that began tonight along with hematuria for the past four days. Patient took two Norcos prior to arrival with no relief.

## 2020-05-15 NOTE — PROGRESS NOTES
Rooming Clinician:     Snehal Zelaya is a 79year old male.   Patient presents with:  Procedure: cystoscopy        HPI:     Dr. Maame Sparks experienced some dark-colored urine earlier this week and urinalysis showed as usual greater than 10 RBCs per hi (10 mg total) by mouth every 6 (six) hours as needed for Pain.  20 tablet 1   • ROSUVASTATIN CALCIUM 20 MG Oral Tab TAKE 1 TABLET EVERY NIGHT 90 tablet 3   • tamsulosin (FLOMAX) cap TAKE 2 CAPSULES EVERY  capsule 3   • DUTASTERIDE 0.5 MG Oral Cap PATSY progressive pigmented dermatosis in lower extremities-relatively stable   • Rosacea 9/17/2010   • Seasonal allergies     spring and fall, and environmental allergies   • Sleep apnea    • Thrombocytopenia ([facility] 11/4/2018   • Tinnitus 9/17/2010    low grade clear  NECK: supple  LUNGS: normal respiratory motion without distress  CARDIO: normal peripheral perfusion  ABDOMEN: no masses,  tenderness, or hernia  : The penis is circumcisedd. Urethral meatus is patent without discharge.   The testicles are descend Ct Abdomen+pelvis Kidneystone 2d Rndr(no Iv,no Oral)(cpt=74176)    Addendum Date: 5/14/2020    CORRECTION Corrected on: 5/14/2020;   PROCEDURE:  CT ABDOMEN+PELVIS KIDNEYSTONE 2D RNDR(NO IV,NO ORAL)(CPT=74176)  COMPARISON:  [facility] , CT, CT UROGRAM(W+WO) calcifications  noted. LYMPH NODES:  No lymphadenopathy in the abdomen or pelvis. BONES:  Degenerative changes in the spine and hips. OTHER:  None. CONCLUSION:   1. Moderate right perinephric stranding with moderate right HYDROURETERONEPHROSIS noted.   No Unremarkable. SPLEEN:  Unremarkable. PANCREAS:  Unremarkable. ADRENALS:  Unremarkable. KIDNEYS:  Moderate right perinephric stranding with moderate right hydronephrosis noted. No obstructing stone is identified.   The findings could be related to pyeloneph hydration  Toradol/Norco for pain and Zofran as needed nausea  We will plan cystoscopy and right ureteroscopy under anesthesia with high-definition video equipment after CT urogram is available.     Diagnoses and all orders for this visit:    Benign Monica Mitchell

## 2020-05-15 NOTE — ED PROVIDER NOTES
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  [facility] History:   Procedure Laterality Date   • CATARACT  11/2012   • CYSTOSCOPY,DIL BLADDER,LOCAL ANESTH  05/2019   • CYSTOSCOPY,INSERT URETERAL STENT      with biopsy, may 2019   • FOREARM/WRIST SURGERY UNLISTED  1997    L wrist fx, ORIF   • Jose Billingsley wheezing, no rhonchi   Abdomen: Positive bowel sounds,  soft, no tenderness, pain is not changed with palpation, no distension, no masses, no organomegaly, no rebound, no guarding   Extremities: No cyanosis, no clubbing, no edema   Musculoskeletal: No tend hydronephrosis and dilation of the full ureter but no stone seen. Patient still has significant pain raising the possibility of either a passed stone or infection or other process.   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 Tyler Dunlap over the

## 2020-05-15 NOTE — CM/SW NOTE
Received a call from [address] , [phone]. ED MD ordered [place] but seems ADRIAN  on 20.   Called Dr. Romero Cornea office, who is currently seeing the patient, and spoke to RN Savana Paul who will have Dr. Mooney Stage order the [place] to be fi

## 2020-05-21 ENCOUNTER — HOSPITAL ENCOUNTER (OUTPATIENT)
Dept: CT IMAGING | Facility: HOSPITAL | Age: 70
Discharge: HOME | End: 2020-05-21
Attending: UROLOGY
Payer: MEDICARE

## 2020-05-21 DIAGNOSIS — R31.0 GROSS HEMATURIA: ICD-10-CM

## 2020-05-21 PROCEDURE — 74178 CT ABD&PLV WO CNTR FLWD CNTR: CPT | Performed by: UROLOGY

## 2020-06-08 ENCOUNTER — APPOINTMENT (OUTPATIENT)
Dept: LAB | Age: 70
End: 2020-06-08
Attending: UROLOGY
Payer: MEDICARE

## 2020-06-08 DIAGNOSIS — R31.29 MICROSCOPIC HEMATURIA: ICD-10-CM

## 2020-06-08 PROCEDURE — 88121 CYTP URINE 3-5 PROBES CMPTR: CPT

## 2020-06-15 ENCOUNTER — TELEPHONE (OUTPATIENT)
Dept: SURGERY | Facility: CLINIC | Age: 70
End: 2020-06-15

## 2020-06-15 DIAGNOSIS — R31.1 BENIGN ESSENTIAL MICROSCOPIC HEMATURIA: Primary | ICD-10-CM

## 2020-06-16 NOTE — TELEPHONE ENCOUNTER
Please call Dr Luc Jacques and schedule cysto, bladder biopsies, retrograde pyelograms, possible bilateral ureteroscopy, ureteral biopsies, washings, and brushings and possible stent placement at [place] under general anaesthesia. Will need 90 minutes.

## 2020-06-16 NOTE — TELEPHONE ENCOUNTER
Spoke with patient and Cystoscopy,bladder biopsies, possible bilateral ureteroscopy, possible ureteral biopsies, washing and brushings, possible ureteral stent placement scheduled for 7/1/20 at [facility]. Reviewed pre op instructions.   Verbalized u

## 2020-06-18 ENCOUNTER — APPOINTMENT (OUTPATIENT)
Dept: LAB | Age: 70
End: 2020-06-18
Attending: UROLOGY
Payer: MEDICARE

## 2020-06-18 DIAGNOSIS — N20.0 KIDNEY STONES: ICD-10-CM

## 2020-06-19 PROCEDURE — 82436 ASSAY OF URINE CHLORIDE: CPT

## 2020-06-19 PROCEDURE — 83945 ASSAY OF OXALATE: CPT

## 2020-06-19 PROCEDURE — 82507 ASSAY OF CITRATE: CPT

## 2020-06-19 PROCEDURE — 82340 ASSAY OF CALCIUM IN URINE: CPT

## 2020-06-19 PROCEDURE — 84300 ASSAY OF URINE SODIUM: CPT

## 2020-06-19 PROCEDURE — 84560 ASSAY OF URINE/URIC ACID: CPT

## 2020-06-19 PROCEDURE — 83735 ASSAY OF MAGNESIUM: CPT

## 2020-06-19 PROCEDURE — 84105 ASSAY OF URINE PHOSPHORUS: CPT

## 2020-06-19 PROCEDURE — 84133 ASSAY OF URINE POTASSIUM: CPT

## 2020-06-19 PROCEDURE — 83986 ASSAY PH BODY FLUID NOS: CPT

## 2020-06-22 ENCOUNTER — TELEPHONE (OUTPATIENT)
Dept: INTERNAL MEDICINE CLINIC | Facility: CLINIC | Age: 70
End: 2020-06-22

## 2020-06-29 ENCOUNTER — LAB ENCOUNTER (OUTPATIENT)
Dept: LAB | Facility: HOSPITAL | Age: 70
End: 2020-06-29
Attending: UROLOGY
Payer: MEDICARE

## 2020-06-29 DIAGNOSIS — Z01.818 PRE-OP TESTING: ICD-10-CM

## 2020-06-30 LAB — SARS-COV-2 RNA RESP QL NAA+PROBE: NOT DETECTED

## 2020-06-30 NOTE — H&P
Dr. 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   Several w Take 1-2 tablets by mouth every 4 (four) hours as needed for Pain. 20 tablet 0   • ondansetron 4 MG Oral Tablet Dispersible Take 1 tablet (4 mg total) by mouth every 4 (four) hours as needed for Nausea.  20 tablet 0   • Ketorolac Tromethamine 10 MG Oral Tab 9/13/2010   • Lumbar epidural mass ([facility] 10/28/2019   • Lyme disease 11/14/2018   • Measles     • Mononucleosis     • MRSA infection 1997     L wrist fracture complicated my MRSA   • Mumps     • Nocturia 9/17/2010   • Overweight(278.02) 9/17/2010   • Progr exertion  GI: denies abdominal pain and denies heartburn  : see HPI  NEURO: no sensory or motor complaint     EXAM:      /68   Pulse 88   Temp 98 °F (36.7 °C) (Oral)   GENERAL: well developed, well nourished,in no apparent distress  SKIN: no rashes 1.25 05/14/2020     BUN 27 (H) 05/14/2020      05/14/2020     K 4.0 05/14/2020     PSA 1.890 12/16/2016      05/14/2020     CO2 24.0 05/14/2020      (H) 05/14/2020     CA 9.3 05/14/2020     ALB 4.0 05/14/2020     ALKPHO 79 05/14/2020     small bowel dilatation. Small hiatal hernia. No free air or fluid. ABDOMINAL WALL:  Small fat containing umbilical hernia.  PELVIC ORGANS:  Urinary bladder wall thickening could be related to underdistention, changes of chronic outlet obstruction, with cy Registry) which includes the Dose Index Registry. PATIENT STATED HISTORY: (As transcribed by Technologist)  Patient with right flank and right lower quadrant pain, hematuria. History of kidney stones. FINDINGS: LUNG BASE:  Minimal atelectasis/scarring. in the left lower lobe measuring up to 7 mm. 4. Small hiatal hernia.      Dictated by: George Fletcher MD on 5/14/2020 at 10:26 PM     Finalized by: George Fletcher MD on 5/14/2020 at 10:32 PM              ASSESSMENT:      Gross hematuria with right flank

## 2020-07-01 ENCOUNTER — APPOINTMENT (OUTPATIENT)
Dept: GENERAL RADIOLOGY | Facility: HOSPITAL | Age: 70
End: 2020-07-01
Attending: UROLOGY
Payer: MEDICARE

## 2020-07-01 ENCOUNTER — HOSPITAL ENCOUNTER (OUTPATIENT)
Facility: HOSPITAL | Age: 70
Setting detail: HOSPITAL OUTPATIENT SURGERY
Discharge: HOME | End: 2020-07-01
Attending: UROLOGY | Admitting: UROLOGY
Payer: MEDICARE

## 2020-07-01 ENCOUNTER — TELEPHONE (OUTPATIENT)
Dept: SURGERY | Facility: CLINIC | Age: 70
End: 2020-07-01

## 2020-07-01 ENCOUNTER — ANESTHESIA (OUTPATIENT)
Dept: SURGERY | Facility: HOSPITAL | Age: 70
End: 2020-07-01
Payer: MEDICARE

## 2020-07-01 ENCOUNTER — ANESTHESIA EVENT (OUTPATIENT)
Dept: SURGERY | Facility: HOSPITAL | Age: 70
End: 2020-07-01
Payer: MEDICARE

## 2020-07-01 VITALS
DIASTOLIC BLOOD PRESSURE: 76 MMHG | SYSTOLIC BLOOD PRESSURE: 153 MMHG | OXYGEN SATURATION: 98 % | TEMPERATURE: 98 F | WEIGHT: 248 LBS | HEIGHT: 71 IN | BODY MASS INDEX: 34.72 KG/M2 | RESPIRATION RATE: 12 BRPM | HEART RATE: 70 BPM

## 2020-07-01 DIAGNOSIS — R31.1 BENIGN ESSENTIAL MICROSCOPIC HEMATURIA: ICD-10-CM

## 2020-07-01 DIAGNOSIS — R31.29 MICROSCOPIC HEMATURIA: ICD-10-CM

## 2020-07-01 DIAGNOSIS — Z01.818 PRE-OP TESTING: Primary | ICD-10-CM

## 2020-07-01 PROCEDURE — 0TB08ZX EXCISION OF RIGHT KIDNEY, VIA NATURAL OR ARTIFICIAL OPENING ENDOSCOPIC, DIAGNOSTIC: ICD-10-PCS | Performed by: UROLOGY

## 2020-07-01 PROCEDURE — 0T768DZ DILATION OF RIGHT URETER WITH INTRALUMINAL DEVICE, VIA NATURAL OR ARTIFICIAL OPENING ENDOSCOPIC: ICD-10-PCS | Performed by: UROLOGY

## 2020-07-01 PROCEDURE — 0TBB8ZX EXCISION OF BLADDER, VIA NATURAL OR ARTIFICIAL OPENING ENDOSCOPIC, DIAGNOSTIC: ICD-10-PCS | Performed by: UROLOGY

## 2020-07-01 PROCEDURE — 52332 CYSTOSCOPY AND TREATMENT: CPT | Performed by: UROLOGY

## 2020-07-01 RX ORDER — SODIUM CHLORIDE, SODIUM LACTATE, POTASSIUM CHLORIDE, CALCIUM CHLORIDE 600; 310; 30; 20 MG/100ML; MG/100ML; MG/100ML; MG/100ML
INJECTION, SOLUTION INTRAVENOUS CONTINUOUS
Status: DISCONTINUED | OUTPATIENT
Start: 2020-07-01 | End: 2020-07-01

## 2020-07-01 RX ORDER — KETOROLAC TROMETHAMINE 30 MG/ML
30 INJECTION, SOLUTION INTRAMUSCULAR; INTRAVENOUS EVERY 6 HOURS PRN
Status: DISCONTINUED | OUTPATIENT
Start: 2020-07-01 | End: 2020-07-01

## 2020-07-01 RX ORDER — HYDROCODONE BITARTRATE AND ACETAMINOPHEN 5; 325 MG/1; MG/1
2 TABLET ORAL AS NEEDED
Status: DISCONTINUED | OUTPATIENT
Start: 2020-07-01 | End: 2020-07-01

## 2020-07-01 RX ORDER — MORPHINE SULFATE 10 MG/ML
6 INJECTION, SOLUTION INTRAMUSCULAR; INTRAVENOUS EVERY 10 MIN PRN
Status: DISCONTINUED | OUTPATIENT
Start: 2020-07-01 | End: 2020-07-01

## 2020-07-01 RX ORDER — HYDROCODONE BITARTRATE AND ACETAMINOPHEN 5; 325 MG/1; MG/1
2 TABLET ORAL EVERY 4 HOURS PRN
Status: DISCONTINUED | OUTPATIENT
Start: 2020-07-01 | End: 2020-07-01

## 2020-07-01 RX ORDER — EPHEDRINE SULFATE 50 MG/ML
INJECTION, SOLUTION INTRAVENOUS AS NEEDED
Status: DISCONTINUED | OUTPATIENT
Start: 2020-07-01 | End: 2020-07-01 | Stop reason: SURG

## 2020-07-01 RX ORDER — ACETAMINOPHEN 500 MG
1000 TABLET ORAL ONCE
Status: COMPLETED | OUTPATIENT
Start: 2020-07-01 | End: 2020-07-01

## 2020-07-01 RX ORDER — NEOSTIGMINE METHYLSULFATE 1 MG/ML
INJECTION INTRAVENOUS AS NEEDED
Status: DISCONTINUED | OUTPATIENT
Start: 2020-07-01 | End: 2020-07-01 | Stop reason: SURG

## 2020-07-01 RX ORDER — KETOROLAC TROMETHAMINE 15 MG/ML
15 INJECTION, SOLUTION INTRAMUSCULAR; INTRAVENOUS EVERY 6 HOURS PRN
Status: DISCONTINUED | OUTPATIENT
Start: 2020-07-01 | End: 2020-07-01

## 2020-07-01 RX ORDER — CEFAZOLIN SODIUM/WATER 2 G/20 ML
2 SYRINGE (ML) INTRAVENOUS ONCE
Status: COMPLETED | OUTPATIENT
Start: 2020-07-01 | End: 2020-07-01

## 2020-07-01 RX ORDER — MORPHINE SULFATE 4 MG/ML
2 INJECTION, SOLUTION INTRAMUSCULAR; INTRAVENOUS EVERY 10 MIN PRN
Status: DISCONTINUED | OUTPATIENT
Start: 2020-07-01 | End: 2020-07-01

## 2020-07-01 RX ORDER — NALOXONE HYDROCHLORIDE 0.4 MG/ML
80 INJECTION, SOLUTION INTRAMUSCULAR; INTRAVENOUS; SUBCUTANEOUS AS NEEDED
Status: DISCONTINUED | OUTPATIENT
Start: 2020-07-01 | End: 2020-07-01

## 2020-07-01 RX ORDER — GLYCOPYRROLATE 0.2 MG/ML
INJECTION, SOLUTION INTRAMUSCULAR; INTRAVENOUS AS NEEDED
Status: DISCONTINUED | OUTPATIENT
Start: 2020-07-01 | End: 2020-07-01 | Stop reason: SURG

## 2020-07-01 RX ORDER — LIDOCAINE HYDROCHLORIDE 20 MG/ML
JELLY TOPICAL AS NEEDED
Status: DISCONTINUED | OUTPATIENT
Start: 2020-07-01 | End: 2020-07-01 | Stop reason: HOSPADM

## 2020-07-01 RX ORDER — MIDAZOLAM HYDROCHLORIDE 1 MG/ML
INJECTION INTRAMUSCULAR; INTRAVENOUS AS NEEDED
Status: DISCONTINUED | OUTPATIENT
Start: 2020-07-01 | End: 2020-07-01 | Stop reason: SURG

## 2020-07-01 RX ORDER — ONDANSETRON 2 MG/ML
4 INJECTION INTRAMUSCULAR; INTRAVENOUS ONCE AS NEEDED
Status: DISCONTINUED | OUTPATIENT
Start: 2020-07-01 | End: 2020-07-01

## 2020-07-01 RX ORDER — MORPHINE SULFATE 4 MG/ML
2 INJECTION, SOLUTION INTRAMUSCULAR; INTRAVENOUS EVERY 2 HOUR PRN
Status: DISCONTINUED | OUTPATIENT
Start: 2020-07-01 | End: 2020-07-01

## 2020-07-01 RX ORDER — ROCURONIUM BROMIDE 10 MG/ML
INJECTION, SOLUTION INTRAVENOUS AS NEEDED
Status: DISCONTINUED | OUTPATIENT
Start: 2020-07-01 | End: 2020-07-01 | Stop reason: SURG

## 2020-07-01 RX ORDER — HYDROMORPHONE HYDROCHLORIDE 1 MG/ML
0.6 INJECTION, SOLUTION INTRAMUSCULAR; INTRAVENOUS; SUBCUTANEOUS EVERY 5 MIN PRN
Status: DISCONTINUED | OUTPATIENT
Start: 2020-07-01 | End: 2020-07-01

## 2020-07-01 RX ORDER — HYDROCODONE BITARTRATE AND ACETAMINOPHEN 5; 325 MG/1; MG/1
1 TABLET ORAL AS NEEDED
Status: DISCONTINUED | OUTPATIENT
Start: 2020-07-01 | End: 2020-07-01

## 2020-07-01 RX ORDER — HYDROCODONE BITARTRATE AND ACETAMINOPHEN 5; 325 MG/1; MG/1
1 TABLET ORAL EVERY 4 HOURS PRN
Status: DISCONTINUED | OUTPATIENT
Start: 2020-07-01 | End: 2020-07-01

## 2020-07-01 RX ORDER — MORPHINE SULFATE 4 MG/ML
6 INJECTION, SOLUTION INTRAMUSCULAR; INTRAVENOUS EVERY 2 HOUR PRN
Status: DISCONTINUED | OUTPATIENT
Start: 2020-07-01 | End: 2020-07-01

## 2020-07-01 RX ORDER — DEXAMETHASONE SODIUM PHOSPHATE 4 MG/ML
VIAL (ML) INJECTION AS NEEDED
Status: DISCONTINUED | OUTPATIENT
Start: 2020-07-01 | End: 2020-07-01 | Stop reason: SURG

## 2020-07-01 RX ORDER — LIDOCAINE HYDROCHLORIDE 10 MG/ML
INJECTION, SOLUTION EPIDURAL; INFILTRATION; INTRACAUDAL; PERINEURAL AS NEEDED
Status: DISCONTINUED | OUTPATIENT
Start: 2020-07-01 | End: 2020-07-01 | Stop reason: SURG

## 2020-07-01 RX ORDER — MORPHINE SULFATE 4 MG/ML
4 INJECTION, SOLUTION INTRAMUSCULAR; INTRAVENOUS EVERY 2 HOUR PRN
Status: DISCONTINUED | OUTPATIENT
Start: 2020-07-01 | End: 2020-07-01

## 2020-07-01 RX ORDER — HYDROMORPHONE HYDROCHLORIDE 1 MG/ML
0.4 INJECTION, SOLUTION INTRAMUSCULAR; INTRAVENOUS; SUBCUTANEOUS EVERY 5 MIN PRN
Status: DISCONTINUED | OUTPATIENT
Start: 2020-07-01 | End: 2020-07-01

## 2020-07-01 RX ORDER — ACETAMINOPHEN 325 MG/1
650 TABLET ORAL EVERY 4 HOURS PRN
Status: DISCONTINUED | OUTPATIENT
Start: 2020-07-01 | End: 2020-07-01

## 2020-07-01 RX ORDER — HYDROMORPHONE HYDROCHLORIDE 1 MG/ML
0.2 INJECTION, SOLUTION INTRAMUSCULAR; INTRAVENOUS; SUBCUTANEOUS EVERY 5 MIN PRN
Status: DISCONTINUED | OUTPATIENT
Start: 2020-07-01 | End: 2020-07-01

## 2020-07-01 RX ORDER — MORPHINE SULFATE 4 MG/ML
4 INJECTION, SOLUTION INTRAMUSCULAR; INTRAVENOUS EVERY 10 MIN PRN
Status: DISCONTINUED | OUTPATIENT
Start: 2020-07-01 | End: 2020-07-01

## 2020-07-01 RX ORDER — ONDANSETRON 2 MG/ML
INJECTION INTRAMUSCULAR; INTRAVENOUS AS NEEDED
Status: DISCONTINUED | OUTPATIENT
Start: 2020-07-01 | End: 2020-07-01 | Stop reason: SURG

## 2020-07-01 RX ORDER — FAMOTIDINE 20 MG/1
20 TABLET ORAL ONCE
Status: COMPLETED | OUTPATIENT
Start: 2020-07-01 | End: 2020-07-01

## 2020-07-01 RX ADMIN — ROCURONIUM BROMIDE 50 MG: 10 INJECTION, SOLUTION INTRAVENOUS at 07:37:00

## 2020-07-01 RX ADMIN — SODIUM CHLORIDE, SODIUM LACTATE, POTASSIUM CHLORIDE, CALCIUM CHLORIDE: 600; 310; 30; 20 INJECTION, SOLUTION INTRAVENOUS at 08:28:00

## 2020-07-01 RX ADMIN — DEXAMETHASONE SODIUM PHOSPHATE 4 MG: 4 MG/ML VIAL (ML) INJECTION at 07:53:00

## 2020-07-01 RX ADMIN — ROCURONIUM BROMIDE 20 MG: 10 INJECTION, SOLUTION INTRAVENOUS at 08:40:00

## 2020-07-01 RX ADMIN — GLYCOPYRROLATE 1 MG: 0.2 INJECTION, SOLUTION INTRAMUSCULAR; INTRAVENOUS at 09:35:00

## 2020-07-01 RX ADMIN — ROCURONIUM BROMIDE 10 MG: 10 INJECTION, SOLUTION INTRAVENOUS at 08:57:00

## 2020-07-01 RX ADMIN — NEOSTIGMINE METHYLSULFATE 5 MG: 1 INJECTION INTRAVENOUS at 09:35:00

## 2020-07-01 RX ADMIN — EPHEDRINE SULFATE 5 MG: 50 INJECTION, SOLUTION INTRAVENOUS at 08:14:00

## 2020-07-01 RX ADMIN — ROCURONIUM BROMIDE 10 MG: 10 INJECTION, SOLUTION INTRAVENOUS at 09:16:00

## 2020-07-01 RX ADMIN — LIDOCAINE HYDROCHLORIDE 50 MG: 10 INJECTION, SOLUTION EPIDURAL; INFILTRATION; INTRACAUDAL; PERINEURAL at 07:36:00

## 2020-07-01 RX ADMIN — CEFAZOLIN SODIUM/WATER 2 G: 2 G/20 ML SYRINGE (ML) INTRAVENOUS at 07:43:00

## 2020-07-01 RX ADMIN — ONDANSETRON 4 MG: 2 INJECTION INTRAMUSCULAR; INTRAVENOUS at 09:38:00

## 2020-07-01 RX ADMIN — EPHEDRINE SULFATE 5 MG: 50 INJECTION, SOLUTION INTRAVENOUS at 08:00:00

## 2020-07-01 RX ADMIN — SODIUM CHLORIDE, SODIUM LACTATE, POTASSIUM CHLORIDE, CALCIUM CHLORIDE: 600; 310; 30; 20 INJECTION, SOLUTION INTRAVENOUS at 09:55:00

## 2020-07-01 RX ADMIN — EPHEDRINE SULFATE 5 MG: 50 INJECTION, SOLUTION INTRAVENOUS at 07:48:00

## 2020-07-01 RX ADMIN — MIDAZOLAM HYDROCHLORIDE 2 MG: 1 INJECTION INTRAMUSCULAR; INTRAVENOUS at 07:36:00

## 2020-07-01 NOTE — ANESTHESIA POSTPROCEDURE EVALUATION
Patient: Radha Monson    Procedure Summary     Date:  07/01/20 Room / Location:  [facility]  / [facility] OR    Anesthesia Start:  6348 Anesthesia Stop:  1738    Procedures:       CYSTOSCOPY URETEROSCOPY (Bilateral )      CYSTOSCOPY STENT INSERTI

## 2020-07-01 NOTE — ANESTHESIA PROCEDURE NOTES
Airway  Date/Time: 7/1/2020 7:39 AM  Urgency: elective    Airway not difficult    General Information and Staff    Patient location during procedure: OR  Anesthesiologist: Toni Tellez MD  Performed: anesthesiologist     Indications and Patient Gilmar Dad

## 2020-07-01 NOTE — TELEPHONE ENCOUNTER
This RN called patient to set up an appointment with Dr Lesli Villatoro next Tuesday, 7/7. Patient unavailable. Left message to wife to call us back.

## 2020-07-01 NOTE — INTERVAL H&P NOTE
Pre-op Diagnosis: Benign essential microscopic hematuria [R31.1]    The above referenced H&P was reviewed by Suma Melo MD on 7/1/2020, the patient was examined and no significant changes have occurred in the patient's condition since the H&P was pe

## 2020-07-01 NOTE — OPERATIVE REPORT
Operative Note    Patient Name: Khadra Neville    Date of Procedure: 7/1/2020    Preoperative Diagnosis: Recurrent microscopic and gross hematuria    Postoperative Diagnosis: Distal right ureteral stricture/BPH    Primary Surgeon: Saeid Diaz.  Rylie table in the supine position and given general anesthesia without difficulty or complication. And the patient's legs were placed in dorsolithotomy position and the perineum and genitalia were then prepped and draped in the usual sterile fashion.   Then maria esther s into the kidney. Examination of the entire kidney proximal mid and distal ureters appeared normal.  A brushing of the left ureter was obtained and sent for histologic analysis.   Then a another open-ended ureteral catheter flushed with saline and with a sy the lumen of the ureter at that level.   A Glidewire was inserted into the ureter on the right side and over that a 6 [place] 26 cm double-J stent was inserted and located in proper position with the pigtail coiled within the kidney and the distal pigtail wi

## 2020-07-01 NOTE — ANESTHESIA PREPROCEDURE EVALUATION
Anesthesia PreOp Note    HPI:     Pablo Mena is a 79year old male who presents for preoperative consultation requested by: Abilio Love MD    Date of Surgery: 7/1/2020    Procedure(s):  CYSTOSCOPY URETEROSCOPY  CYSTOSCOPY STENT INSERTION • CRP elevated 2009   • Depression    • Dizziness and giddiness 8/19/2011    and lightheaded   • Environmental allergies 12/28/2015    Positive I CAP test.  Grasses trees dust doing well on daily Zyrtec.     • [place] measles    • Hemorrhoids    • Hepatitis Coenzyme Q10 (COQ-10 OR), Take 1 capsule by mouth., Disp: , Rfl: , 6/27/2020 at Unknown time  Omega-3 Fatty Acids (FISH OIL OR), Take 1 capsule by mouth daily. , Disp: , Rfl: , 6/27/2020 at Unknown time  ROSUVASTATIN CALCIUM 20 MG Oral Tab, TAKE 1 TABLET EV hypothyroidism, child   • Glaucoma Other         sibling     Social History    Socioeconomic History      Marital status:       Spouse name: Not on file      Number of children: Not on file      Years of education: Not on file      Highest educ Seat Belt: Not Asked        Self-Exams: Not Asked    Social History Narrative      Not on file      Available pre-op labs reviewed.   Lab Results   Component Value Date    WBC 11.4 (H) 05/14/2020    RBC 4.89 05/14/2020    HGB 14.9 05/14/2020    HCT 44 Discussed plan with:  Surgeon      I have informed Fatoumata Madrigal and/or legal guardian or family member of the nature of the anesthetic plan, benefits, risks including possible dental damage if relevant, major complications, and any alternative for

## 2020-07-03 NOTE — PROGRESS NOTES
Your recent right ureteral and renal and bladder biopsies are negative.       Sincerely,  Chantel Borja MD

## 2020-07-16 ENCOUNTER — TELEPHONE (OUTPATIENT)
Dept: INTERNAL MEDICINE CLINIC | Facility: CLINIC | Age: 70
End: 2020-07-16

## 2020-07-16 DIAGNOSIS — N30.00 ACUTE CYSTITIS WITHOUT HEMATURIA: Primary | ICD-10-CM

## 2020-07-21 ENCOUNTER — TELEPHONE (OUTPATIENT)
Dept: INTERNAL MEDICINE CLINIC | Facility: CLINIC | Age: 70
End: 2020-07-21

## 2020-07-21 ENCOUNTER — OFFICE VISIT (OUTPATIENT)
Dept: INTERNAL MEDICINE CLINIC | Facility: CLINIC | Age: 70
End: 2020-07-21
Payer: MEDICARE

## 2020-07-21 VITALS
HEART RATE: 66 BPM | OXYGEN SATURATION: 95 % | SYSTOLIC BLOOD PRESSURE: 135 MMHG | RESPIRATION RATE: 14 BRPM | TEMPERATURE: 98 F | DIASTOLIC BLOOD PRESSURE: 80 MMHG

## 2020-07-21 DIAGNOSIS — D64.9 LOW HEMOGLOBIN: ICD-10-CM

## 2020-07-21 DIAGNOSIS — C66.1 CANCER OF URETER, RIGHT (HCC): Primary | ICD-10-CM

## 2020-07-21 PROCEDURE — 99214 OFFICE O/P EST MOD 30 MIN: CPT | Performed by: INTERNAL MEDICINE

## 2020-07-21 NOTE — PROGRESS NOTES
Patient presents with:  Post-Op      HPI: Patient seen today patient has distal right ureteral.  Had [place] of [place] distally[place] [facility]     Patient probably had a distal ureteral obstruction the docs did not feel that it was tumor all but w fever, malaise/fatigue and weight loss. HENT: Negative. Eyes: Negative for blurred vision, double vision, photophobia and pain. Respiratory: Negative for cough, hemoptysis and sputum production.     Cardiovascular: Negative for chest pain, palpitatio 1 CAPSULE EVERY NIGHT 90 capsule 3   • SERTRALINE HCL 50 MG Oral Tab TAKE 1 TABLET EVERY DAY 90 tablet 3   • Cholecalciferol (VITAMIN D OR) Take 1 capsule by mouth daily.        • psyllium (METAMUCIL SMOOTH TEXTURE) 28 % Oral Powd Pack Take 1 packet by mout

## 2020-08-06 ENCOUNTER — TELEPHONE (OUTPATIENT)
Dept: HEMATOLOGY/ONCOLOGY | Facility: HOSPITAL | Age: 70
End: 2020-08-06

## 2020-08-06 DIAGNOSIS — C66.1 URETERAL CANCER, RIGHT (HCC): Primary | ICD-10-CM

## 2020-08-06 NOTE — TELEPHONE ENCOUNTER
Patient calling to ask about the process to get himself in for treatment for bladder instillation with orders coming in from Harish Flores. Patient informed that we are still waiting for orders from his MD and this will have to be verified by our billing.  Once

## 2020-08-07 ENCOUNTER — TELEPHONE (OUTPATIENT)
Dept: SURGERY | Facility: CLINIC | Age: 70
End: 2020-08-07

## 2020-08-07 NOTE — TELEPHONE ENCOUNTER
Spoke with Jayla Crowder at the [address] [place] [address] cancer center. States patient is all set to start BCG on 8/17 and they will contact the patient next week. I informed patient. Verbalized understanding.

## 2020-08-11 ENCOUNTER — TELEPHONE (OUTPATIENT)
Dept: HEMATOLOGY/ONCOLOGY | Facility: HOSPITAL | Age: 70
End: 2020-08-11

## 2020-08-11 NOTE — TELEPHONE ENCOUNTER
Patient called to confirm appointment for BCG Bladder installation on 8/17/20. Advised patient that the order for BCG was received in Epic and we confirmed with our pharmacist that the product will be available for administration.  I also informed patient

## 2020-08-13 ENCOUNTER — TELEPHONE (OUTPATIENT)
Dept: HEMATOLOGY/ONCOLOGY | Facility: HOSPITAL | Age: 70
End: 2020-08-13

## 2020-08-13 NOTE — TELEPHONE ENCOUNTER
Called patient to advise of current status in the creation of a therapy plan in [facility].   Advised patient that I thought it best we postpone start of care until Wednesday 8/19/20 in order to make certain everything is in place for proper administration and bi

## 2020-08-14 ENCOUNTER — TELEPHONE (OUTPATIENT)
Dept: HEMATOLOGY/ONCOLOGY | Facility: HOSPITAL | Age: 70
End: 2020-08-14

## 2020-08-14 NOTE — TELEPHONE ENCOUNTER
RN faxed the completed [facility] Protocol Request information back to [facility], 8/14 Friday. Confirmation received.

## 2020-08-14 NOTE — TELEPHONE ENCOUNTER
RN also called and updated patient of the [facility] Protocol form faxed successfully to [facility], [address]. No questions at this time.

## 2020-08-14 NOTE — TELEPHONE ENCOUNTER
Called patient to confirm appointment date/time for 8/19/20 @ 2:00 pm.  Patient aware that UA needs to be collected upon arrival.

## 2020-08-19 ENCOUNTER — TELEPHONE (OUTPATIENT)
Dept: SURGERY | Facility: CLINIC | Age: 70
End: 2020-08-19

## 2020-08-19 ENCOUNTER — OFFICE VISIT (OUTPATIENT)
Dept: HEMATOLOGY/ONCOLOGY | Facility: HOSPITAL | Age: 70
End: 2020-08-19
Attending: UROLOGY
Payer: MEDICARE

## 2020-08-19 VITALS
SYSTOLIC BLOOD PRESSURE: 131 MMHG | WEIGHT: 241.81 LBS | DIASTOLIC BLOOD PRESSURE: 76 MMHG | RESPIRATION RATE: 16 BRPM | HEART RATE: 67 BPM | TEMPERATURE: 97 F | BODY MASS INDEX: 33.85 KG/M2 | OXYGEN SATURATION: 96 % | HEIGHT: 71 IN

## 2020-08-19 DIAGNOSIS — Z79.899 OTHER LONG TERM (CURRENT) DRUG THERAPY: ICD-10-CM

## 2020-08-19 DIAGNOSIS — C66.1 CANCER OF RIGHT URETER (HCC): Primary | ICD-10-CM

## 2020-08-19 LAB
BILIRUB UR QL STRIP.AUTO: NEGATIVE
CLARITY UR REFRACT.AUTO: CLEAR
COLOR UR AUTO: YELLOW
GLUCOSE UR STRIP.AUTO-MCNC: NEGATIVE MG/DL
KETONES UR STRIP.AUTO-MCNC: NEGATIVE MG/DL
NITRITE UR QL STRIP.AUTO: NEGATIVE
PH UR STRIP.AUTO: 5 [PH] (ref 4.5–8)
PROT UR STRIP.AUTO-MCNC: NEGATIVE MG/DL
RBC #/AREA URNS AUTO: >10 /HPF
SP GR UR STRIP.AUTO: 1.01 (ref 1–1.03)
UROBILINOGEN UR STRIP.AUTO-MCNC: <2 MG/DL

## 2020-08-19 PROCEDURE — 81001 URINALYSIS AUTO W/SCOPE: CPT | Performed by: UROLOGY

## 2020-08-19 PROCEDURE — 87086 URINE CULTURE/COLONY COUNT: CPT

## 2020-08-19 NOTE — TELEPHONE ENCOUNTER
RN received a call from the [address] [place] [address]. Spoke to [employer]. He relayed that Nitrite is (-) but the bacteria is rare. RN updated MD. MD ok to instill BCG today. RN called [facility] back and updated.

## 2020-08-19 NOTE — PROGRESS NOTES
Pt did not receive treatment today. UA showed elevated WBC count with \"Rare\" finding for bacteria in the urine. Treatment postponed to Friday per MD. Urine cultures taken. Will return Friday.

## 2020-08-20 ENCOUNTER — TELEPHONE (OUTPATIENT)
Dept: HEMATOLOGY/ONCOLOGY | Facility: HOSPITAL | Age: 70
End: 2020-08-20

## 2020-08-20 ENCOUNTER — TELEPHONE (OUTPATIENT)
Dept: SURGERY | Facility: CLINIC | Age: 70
End: 2020-08-20

## 2020-08-20 NOTE — PROGRESS NOTES
Your recent urine culture shows no growth at 18 to 24 hours according to the report and is normal.  Recommend follow up in the office as directed.     Sincerely,  Anne-Marie Christensen MD

## 2020-08-20 NOTE — TELEPHONE ENCOUNTER
Spoke with Trish at the cancer center and patient's culture is still pending. Dr Jeanette Owens has been notified and states to push back the appt the same day if possible. [facility] will try to push it back to 8:30 am and notify the patient.   Dr Jeanette Owens will w

## 2020-08-20 NOTE — TELEPHONE ENCOUNTER
Pt scheduled for BCG instillation tomorrow at 0745. Spoke with Isabel Hastings RN with Dr. Adalberto Marie office- will need to wait for urine culture results before receiving okay from MD to treat. Does not need another UA tomorrow.      Pt aware of need to wait for cult

## 2020-08-20 NOTE — TELEPHONE ENCOUNTER
ayush from [facility] called requesting to speak to the nurse or doctor regarding orders.    Please call today, pt is scheduled for bcg appointment 8-21-20 at 7:45am.  Please call

## 2020-08-21 ENCOUNTER — OFFICE VISIT (OUTPATIENT)
Dept: HEMATOLOGY/ONCOLOGY | Facility: HOSPITAL | Age: 70
End: 2020-08-21
Attending: UROLOGY
Payer: MEDICARE

## 2020-08-21 ENCOUNTER — TELEPHONE (OUTPATIENT)
Dept: SURGERY | Facility: CLINIC | Age: 70
End: 2020-08-21

## 2020-08-21 VITALS
RESPIRATION RATE: 16 BRPM | WEIGHT: 240.81 LBS | HEIGHT: 70.98 IN | HEART RATE: 75 BPM | TEMPERATURE: 99 F | OXYGEN SATURATION: 96 % | BODY MASS INDEX: 33.71 KG/M2 | DIASTOLIC BLOOD PRESSURE: 72 MMHG | SYSTOLIC BLOOD PRESSURE: 117 MMHG

## 2020-08-21 DIAGNOSIS — C66.1 CANCER OF RIGHT URETER (HCC): Primary | ICD-10-CM

## 2020-08-21 PROCEDURE — 51720 TREATMENT OF BLADDER LESION: CPT

## 2020-08-21 NOTE — TELEPHONE ENCOUNTER
RN received a call from Lovett at [facility] ([phone]) to inform that the urine culture is negative after 18-24 hrs. He said that the treatment was held last time. Calling to ask if MD is ok to start with BCG treatment. RN made MD aware.  \"Yes, ok

## 2020-08-21 NOTE — PROGRESS NOTES
Education Record    Learner:  Patient    Disease / Diagnosis:Cancer of Right Ureter    Barriers / Limitations:  None   Comments:    Method:  Discussion   Comments:    General Topics:  Plan of care reviewed   Comments:    Outcome:  Shows understanding   Com

## 2020-08-28 ENCOUNTER — OFFICE VISIT (OUTPATIENT)
Dept: HEMATOLOGY/ONCOLOGY | Facility: HOSPITAL | Age: 70
End: 2020-08-28
Attending: UROLOGY
Payer: MEDICARE

## 2020-08-28 VITALS
HEART RATE: 62 BPM | WEIGHT: 242 LBS | BODY MASS INDEX: 33.88 KG/M2 | RESPIRATION RATE: 16 BRPM | HEIGHT: 70.98 IN | DIASTOLIC BLOOD PRESSURE: 66 MMHG | OXYGEN SATURATION: 95 % | SYSTOLIC BLOOD PRESSURE: 132 MMHG | TEMPERATURE: 98 F

## 2020-08-28 DIAGNOSIS — C66.1 CANCER OF RIGHT URETER (HCC): Primary | ICD-10-CM

## 2020-08-28 LAB
BILIRUB UR QL STRIP.AUTO: NEGATIVE
CLARITY UR REFRACT.AUTO: CLEAR
COLOR UR AUTO: YELLOW
GLUCOSE UR STRIP.AUTO-MCNC: NEGATIVE MG/DL
KETONES UR STRIP.AUTO-MCNC: NEGATIVE MG/DL
NITRITE UR QL STRIP.AUTO: NEGATIVE
PH UR STRIP.AUTO: 6 [PH] (ref 4.5–8)
PROT UR STRIP.AUTO-MCNC: NEGATIVE MG/DL
RBC #/AREA URNS AUTO: >10 /HPF
SP GR UR STRIP.AUTO: 1.02 (ref 1–1.03)
UROBILINOGEN UR STRIP.AUTO-MCNC: 0.2 MG/DL

## 2020-08-28 PROCEDURE — 51720 TREATMENT OF BLADDER LESION: CPT

## 2020-08-28 PROCEDURE — 81001 URINALYSIS AUTO W/SCOPE: CPT

## 2020-08-28 NOTE — PROGRESS NOTES
Education Record    Learner:  Patient    Disease / Maikel Prime or Right Ureter    Barriers / Limitations:  None   Comments:    Method:  Discussion   Comments:    General Topics:  Plan of care reviewed   Comments:    Outcome:  Shows understanding   Com

## 2020-09-02 ENCOUNTER — TELEPHONE (OUTPATIENT)
Dept: SURGERY | Facility: CLINIC | Age: 70
End: 2020-09-02

## 2020-09-02 DIAGNOSIS — C66.9 MALIGNANT NEOPLASM OF URETER, UNSPECIFIED LATERALITY (HCC): Primary | ICD-10-CM

## 2020-09-02 NOTE — TELEPHONE ENCOUNTER
Per Dr Encinas Harm order put I Epic for patient to have UA done every Thursday for the next 4 Thursdays pre BCG treatment.

## 2020-09-03 ENCOUNTER — LAB ENCOUNTER (OUTPATIENT)
Dept: LAB | Facility: HOSPITAL | Age: 70
End: 2020-09-03
Attending: UROLOGY
Payer: MEDICARE

## 2020-09-03 DIAGNOSIS — C66.1 CANCER OF RIGHT URETER (HCC): ICD-10-CM

## 2020-09-03 LAB
BILIRUB UR QL STRIP.AUTO: NEGATIVE
GLUCOSE UR STRIP.AUTO-MCNC: NEGATIVE MG/DL
KETONES UR STRIP.AUTO-MCNC: NEGATIVE MG/DL
NITRITE UR QL STRIP.AUTO: NEGATIVE
PH UR STRIP.AUTO: 5 [PH] (ref 4.5–8)
PROT UR STRIP.AUTO-MCNC: 100 MG/DL
RBC #/AREA URNS AUTO: >10 /HPF
SP GR UR STRIP.AUTO: 1.02 (ref 1–1.03)
UROBILINOGEN UR STRIP.AUTO-MCNC: <2 MG/DL
WBC #/AREA URNS AUTO: >50 /HPF

## 2020-09-03 PROCEDURE — 87086 URINE CULTURE/COLONY COUNT: CPT | Performed by: UROLOGY

## 2020-09-03 PROCEDURE — 81001 URINALYSIS AUTO W/SCOPE: CPT

## 2020-09-04 ENCOUNTER — OFFICE VISIT (OUTPATIENT)
Dept: HEMATOLOGY/ONCOLOGY | Facility: HOSPITAL | Age: 70
End: 2020-09-04
Attending: UROLOGY
Payer: MEDICARE

## 2020-09-04 ENCOUNTER — LAB ENCOUNTER (OUTPATIENT)
Dept: LAB | Age: 70
End: 2020-09-04
Attending: UROLOGY
Payer: MEDICARE

## 2020-09-04 ENCOUNTER — DOCUMENTATION ONLY (OUTPATIENT)
Dept: HEMATOLOGY/ONCOLOGY | Facility: HOSPITAL | Age: 70
End: 2020-09-04

## 2020-09-04 VITALS
TEMPERATURE: 98 F | BODY MASS INDEX: 34.39 KG/M2 | WEIGHT: 245.63 LBS | OXYGEN SATURATION: 96 % | HEIGHT: 70.98 IN | DIASTOLIC BLOOD PRESSURE: 75 MMHG | SYSTOLIC BLOOD PRESSURE: 148 MMHG | RESPIRATION RATE: 16 BRPM | HEART RATE: 61 BPM

## 2020-09-04 DIAGNOSIS — N30.00 ACUTE CYSTITIS WITHOUT HEMATURIA: ICD-10-CM

## 2020-09-04 DIAGNOSIS — C66.1 CANCER OF RIGHT URETER (HCC): Primary | ICD-10-CM

## 2020-09-04 DIAGNOSIS — C66.9 MALIGNANT NEOPLASM OF URETER, UNSPECIFIED LATERALITY (HCC): ICD-10-CM

## 2020-09-04 LAB
BILIRUB UR QL STRIP.AUTO: NEGATIVE
CLARITY UR REFRACT.AUTO: CLEAR
COLOR UR AUTO: YELLOW
GLUCOSE UR STRIP.AUTO-MCNC: NEGATIVE MG/DL
HYALINE CASTS #/AREA URNS AUTO: PRESENT /LPF
KETONES UR STRIP.AUTO-MCNC: NEGATIVE MG/DL
NITRITE UR QL STRIP.AUTO: NEGATIVE
PH UR STRIP.AUTO: 5 [PH] (ref 4.5–8)
PROT UR STRIP.AUTO-MCNC: 30 MG/DL
RBC #/AREA URNS AUTO: >10 /HPF
SP GR UR STRIP.AUTO: 1.01 (ref 1–1.03)
UROBILINOGEN UR STRIP.AUTO-MCNC: <2 MG/DL

## 2020-09-04 PROCEDURE — 87086 URINE CULTURE/COLONY COUNT: CPT

## 2020-09-04 PROCEDURE — 81001 URINALYSIS AUTO W/SCOPE: CPT

## 2020-09-04 PROCEDURE — 51720 TREATMENT OF BLADDER LESION: CPT

## 2020-09-05 NOTE — PROGRESS NOTES
Your recent urine culture has no growth and is normal.  Recommend follow up in the office as directed.     Sincerely,  Joceline Gutierrez MD

## 2020-09-10 ENCOUNTER — LAB ENCOUNTER (OUTPATIENT)
Dept: LAB | Age: 70
End: 2020-09-10
Attending: UROLOGY
Payer: MEDICARE

## 2020-09-10 DIAGNOSIS — C66.9 MALIGNANT NEOPLASM OF URETER, UNSPECIFIED LATERALITY (HCC): ICD-10-CM

## 2020-09-10 LAB
BILIRUB UR QL STRIP.AUTO: NEGATIVE
COLOR UR AUTO: YELLOW
GLUCOSE UR STRIP.AUTO-MCNC: NEGATIVE MG/DL
KETONES UR STRIP.AUTO-MCNC: NEGATIVE MG/DL
NITRITE UR QL STRIP.AUTO: NEGATIVE
PH UR STRIP.AUTO: 6 [PH] (ref 4.5–8)
PROT UR STRIP.AUTO-MCNC: 100 MG/DL
RBC #/AREA URNS AUTO: >10 /HPF
SP GR UR STRIP.AUTO: 1.02 (ref 1–1.03)
UROBILINOGEN UR STRIP.AUTO-MCNC: <2 MG/DL
YEAST URINE: PRESENT

## 2020-09-10 PROCEDURE — 81001 URINALYSIS AUTO W/SCOPE: CPT

## 2020-09-11 ENCOUNTER — OFFICE VISIT (OUTPATIENT)
Dept: HEMATOLOGY/ONCOLOGY | Facility: HOSPITAL | Age: 70
End: 2020-09-11
Attending: UROLOGY
Payer: MEDICARE

## 2020-09-11 VITALS
HEIGHT: 70.98 IN | SYSTOLIC BLOOD PRESSURE: 118 MMHG | DIASTOLIC BLOOD PRESSURE: 68 MMHG | BODY MASS INDEX: 33.88 KG/M2 | HEART RATE: 60 BPM | TEMPERATURE: 98 F | OXYGEN SATURATION: 95 % | WEIGHT: 242 LBS | RESPIRATION RATE: 16 BRPM

## 2020-09-11 DIAGNOSIS — C66.1 CANCER OF RIGHT URETER (HCC): Primary | ICD-10-CM

## 2020-09-11 PROCEDURE — 51720 TREATMENT OF BLADDER LESION: CPT

## 2020-09-17 ENCOUNTER — LAB ENCOUNTER (OUTPATIENT)
Dept: LAB | Age: 70
End: 2020-09-17
Attending: UROLOGY
Payer: MEDICARE

## 2020-09-17 DIAGNOSIS — C66.9 MALIGNANT NEOPLASM OF URETER, UNSPECIFIED LATERALITY (HCC): ICD-10-CM

## 2020-09-17 LAB
BILIRUB UR QL STRIP.AUTO: NEGATIVE
COLOR UR AUTO: YELLOW
GLUCOSE UR STRIP.AUTO-MCNC: NEGATIVE MG/DL
KETONES UR STRIP.AUTO-MCNC: NEGATIVE MG/DL
NITRITE UR QL STRIP.AUTO: NEGATIVE
PH UR STRIP.AUTO: 5 [PH] (ref 4.5–8)
PROT UR STRIP.AUTO-MCNC: NEGATIVE MG/DL
RBC #/AREA URNS AUTO: >10 /HPF
SP GR UR STRIP.AUTO: 1.01 (ref 1–1.03)
UROBILINOGEN UR STRIP.AUTO-MCNC: <2 MG/DL

## 2020-09-17 PROCEDURE — 81001 URINALYSIS AUTO W/SCOPE: CPT

## 2020-09-18 ENCOUNTER — OFFICE VISIT (OUTPATIENT)
Dept: HEMATOLOGY/ONCOLOGY | Facility: HOSPITAL | Age: 70
End: 2020-09-18
Attending: UROLOGY
Payer: MEDICARE

## 2020-09-18 VITALS
HEIGHT: 70.98 IN | WEIGHT: 243.13 LBS | RESPIRATION RATE: 16 BRPM | BODY MASS INDEX: 34.04 KG/M2 | OXYGEN SATURATION: 96 % | TEMPERATURE: 97 F | DIASTOLIC BLOOD PRESSURE: 70 MMHG | SYSTOLIC BLOOD PRESSURE: 118 MMHG | HEART RATE: 66 BPM

## 2020-09-18 DIAGNOSIS — C66.1 CANCER OF RIGHT URETER (HCC): Primary | ICD-10-CM

## 2020-09-18 PROCEDURE — 51720 TREATMENT OF BLADDER LESION: CPT

## 2020-09-18 NOTE — PROGRESS NOTES
Education Record    Learner:  Patient    Disease / Virlinda Calixto of right ureter    Barriers / Limitations:  None   Comments:    Method:  Discussion   Comments:    General Topics:  Plan of care reviewed   Comments:    Outcome:  Shows understanding   Com

## 2020-09-24 ENCOUNTER — LAB ENCOUNTER (OUTPATIENT)
Dept: LAB | Age: 70
End: 2020-09-24
Attending: UROLOGY
Payer: MEDICARE

## 2020-09-24 DIAGNOSIS — C66.9 MALIGNANT NEOPLASM OF URETER, UNSPECIFIED LATERALITY (HCC): ICD-10-CM

## 2020-09-24 LAB
BILIRUB UR QL STRIP.AUTO: NEGATIVE
COLOR UR AUTO: YELLOW
GLUCOSE UR STRIP.AUTO-MCNC: NEGATIVE MG/DL
KETONES UR STRIP.AUTO-MCNC: NEGATIVE MG/DL
NITRITE UR QL STRIP.AUTO: NEGATIVE
PH UR STRIP.AUTO: 6 [PH] (ref 4.5–8)
PROT UR STRIP.AUTO-MCNC: 30 MG/DL
RBC #/AREA URNS AUTO: >10 /HPF
SP GR UR STRIP.AUTO: 1.02 (ref 1–1.03)
UROBILINOGEN UR STRIP.AUTO-MCNC: <2 MG/DL

## 2020-09-24 PROCEDURE — 81001 URINALYSIS AUTO W/SCOPE: CPT

## 2020-09-25 ENCOUNTER — OFFICE VISIT (OUTPATIENT)
Dept: HEMATOLOGY/ONCOLOGY | Facility: HOSPITAL | Age: 70
End: 2020-09-25
Attending: UROLOGY
Payer: MEDICARE

## 2020-09-25 VITALS
DIASTOLIC BLOOD PRESSURE: 62 MMHG | BODY MASS INDEX: 33.64 KG/M2 | WEIGHT: 240.31 LBS | SYSTOLIC BLOOD PRESSURE: 126 MMHG | HEART RATE: 59 BPM | HEIGHT: 70.98 IN | OXYGEN SATURATION: 96 % | TEMPERATURE: 98 F | RESPIRATION RATE: 16 BRPM

## 2020-09-25 DIAGNOSIS — C66.1 CANCER OF RIGHT URETER (HCC): Primary | ICD-10-CM

## 2020-09-25 PROCEDURE — 51720 TREATMENT OF BLADDER LESION: CPT

## 2020-10-09 RX ORDER — TAMSULOSIN HYDROCHLORIDE 0.4 MG/1
0.8 CAPSULE ORAL DAILY
Qty: 180 CAPSULE | Refills: 0 | Status: SHIPPED | OUTPATIENT
Start: 2020-10-09 | End: 2021-03-03

## 2020-10-09 RX ORDER — ROSUVASTATIN CALCIUM 20 MG/1
20 TABLET, COATED ORAL NIGHTLY
Qty: 90 TABLET | Refills: 0 | Status: SHIPPED | OUTPATIENT
Start: 2020-10-09 | End: 2021-03-03

## 2020-10-09 RX ORDER — DUTASTERIDE 0.5 MG/1
0.5 CAPSULE, LIQUID FILLED ORAL NIGHTLY
Qty: 90 CAPSULE | Refills: 0 | Status: SHIPPED | OUTPATIENT
Start: 2020-10-09 | End: 2021-03-03

## 2020-11-05 ENCOUNTER — TELEPHONE (OUTPATIENT)
Dept: SURGERY | Facility: CLINIC | Age: 70
End: 2020-11-05

## 2020-11-05 NOTE — TELEPHONE ENCOUNTER
Please schedule Dr. Jeana Fagan for BCG, 3 treatments at [facility]. This is part of maintenance program.  He had a recent cystoscopy and biopsies at [facility].

## 2020-11-06 ENCOUNTER — TELEPHONE (OUTPATIENT)
Dept: SURGERY | Facility: CLINIC | Age: 70
End: 2020-11-06

## 2020-11-06 NOTE — TELEPHONE ENCOUNTER
RN received a completed Chemotherapy Order Sheet from Dr Harish Flores. Information was successfully faxed to [place] at [phone].

## 2020-11-09 ENCOUNTER — TELEPHONE (OUTPATIENT)
Dept: SURGERY | Facility: CLINIC | Age: 70
End: 2020-11-09

## 2020-11-09 NOTE — TELEPHONE ENCOUNTER
RN called [facility] to confirm if they received the written order sent by fax last week for this patient. Spoke to [facility] and she confirmed receiving the copies (for both patient, RK). RN thankful.

## 2020-11-10 NOTE — TELEPHONE ENCOUNTER
RN spoke to Cesar at [facility]. Amrik Conshohocken that he already spoke to the patient yesterday. He is on the schedule for 11/27.

## 2020-11-23 DIAGNOSIS — C66.1 CANCER OF RIGHT URETER (HCC): Primary | ICD-10-CM

## 2020-11-23 RX ORDER — LIDOCAINE HYDROCHLORIDE 20 MG/ML
10 JELLY TOPICAL ONCE
Status: CANCELLED
Start: 2020-11-27 | End: 2020-11-27

## 2020-11-25 ENCOUNTER — LAB ENCOUNTER (OUTPATIENT)
Dept: LAB | Age: 70
End: 2020-11-25
Attending: UROLOGY
Payer: MEDICARE

## 2020-11-25 ENCOUNTER — LAB ENCOUNTER (OUTPATIENT)
Dept: LAB | Facility: HOSPITAL | Age: 70
End: 2020-11-25
Attending: UROLOGY
Payer: MEDICARE

## 2020-11-25 ENCOUNTER — TELEPHONE (OUTPATIENT)
Dept: SURGERY | Facility: CLINIC | Age: 70
End: 2020-11-25

## 2020-11-25 DIAGNOSIS — R82.90 ABNORMAL URINE FINDING: ICD-10-CM

## 2020-11-25 DIAGNOSIS — R82.90 ABNORMAL URINE FINDING: Primary | ICD-10-CM

## 2020-11-25 DIAGNOSIS — C66.9 MALIGNANT NEOPLASM OF URETER, UNSPECIFIED LATERALITY (HCC): ICD-10-CM

## 2020-11-25 DIAGNOSIS — C66.1 CANCER OF RIGHT URETER (HCC): ICD-10-CM

## 2020-11-25 PROCEDURE — 87086 URINE CULTURE/COLONY COUNT: CPT

## 2020-11-25 PROCEDURE — 81001 URINALYSIS AUTO W/SCOPE: CPT

## 2020-11-25 NOTE — TELEPHONE ENCOUNTER
Spoke with Karsten Schmitz at [facility] lab and states UA shows rare bacteria. Patient notified and will return to lab for urine culture.

## 2020-11-25 NOTE — TELEPHONE ENCOUNTER
Per Liza Hunter in lab nitrate positive for bacteria (instructed to contact Dr. Stephanie Virgen) Liza Hunter states no grey top and order were sent.  Please advise

## 2020-11-27 ENCOUNTER — OFFICE VISIT (OUTPATIENT)
Dept: HEMATOLOGY/ONCOLOGY | Facility: HOSPITAL | Age: 70
End: 2020-11-27
Attending: UROLOGY
Payer: MEDICARE

## 2020-11-27 VITALS
OXYGEN SATURATION: 96 % | SYSTOLIC BLOOD PRESSURE: 120 MMHG | DIASTOLIC BLOOD PRESSURE: 67 MMHG | BODY MASS INDEX: 34.44 KG/M2 | TEMPERATURE: 97 F | HEART RATE: 73 BPM | HEIGHT: 70.98 IN | WEIGHT: 246 LBS | RESPIRATION RATE: 16 BRPM

## 2020-11-27 DIAGNOSIS — C66.1 CANCER OF RIGHT URETER (HCC): Primary | ICD-10-CM

## 2020-11-27 PROCEDURE — 51720 TREATMENT OF BLADDER LESION: CPT

## 2020-11-27 RX ORDER — LIDOCAINE HYDROCHLORIDE 20 MG/ML
10 JELLY TOPICAL ONCE
Status: COMPLETED | OUTPATIENT
Start: 2020-11-27 | End: 2020-11-27

## 2020-11-27 RX ORDER — LIDOCAINE HYDROCHLORIDE 20 MG/ML
10 JELLY TOPICAL ONCE
Status: CANCELLED
Start: 2020-11-30 | End: 2020-11-30

## 2020-11-27 RX ADMIN — LIDOCAINE HYDROCHLORIDE 10 ML: 20 JELLY TOPICAL at 08:29:00

## 2020-11-27 NOTE — PROGRESS NOTES
Your recent urine culture is normal is normal.  Okay to proceed with BCG as scheduled.     Sincerely,  Mert Lynch MD

## 2020-11-27 NOTE — PROGRESS NOTES
Education Record    Learner:  Patient    Disease / Nicole Kemps or Right Ureter    Barriers / Limitations:  None   Comments:    Method:  Discussion   Comments:    General Topics:  Plan of care reviewed   Comments:    Outcome:  Shows understanding   Com

## 2020-12-04 ENCOUNTER — APPOINTMENT (OUTPATIENT)
Dept: HEMATOLOGY/ONCOLOGY | Facility: HOSPITAL | Age: 70
End: 2020-12-04
Attending: UROLOGY
Payer: MEDICARE

## 2020-12-07 ENCOUNTER — TELEPHONE (OUTPATIENT)
Dept: SURGERY | Facility: CLINIC | Age: 70
End: 2020-12-07

## 2020-12-10 ENCOUNTER — LAB ENCOUNTER (OUTPATIENT)
Dept: LAB | Age: 70
End: 2020-12-10
Attending: UROLOGY
Payer: MEDICARE

## 2020-12-10 DIAGNOSIS — R82.90 ABNORMAL URINE FINDING: ICD-10-CM

## 2020-12-10 PROCEDURE — 81001 URINALYSIS AUTO W/SCOPE: CPT

## 2020-12-10 PROCEDURE — 87086 URINE CULTURE/COLONY COUNT: CPT

## 2020-12-11 ENCOUNTER — OFFICE VISIT (OUTPATIENT)
Dept: HEMATOLOGY/ONCOLOGY | Facility: HOSPITAL | Age: 70
End: 2020-12-11
Attending: UROLOGY
Payer: MEDICARE

## 2020-12-11 VITALS
HEART RATE: 73 BPM | BODY MASS INDEX: 34 KG/M2 | DIASTOLIC BLOOD PRESSURE: 70 MMHG | WEIGHT: 246 LBS | TEMPERATURE: 98 F | OXYGEN SATURATION: 97 % | RESPIRATION RATE: 16 BRPM | SYSTOLIC BLOOD PRESSURE: 123 MMHG

## 2020-12-11 DIAGNOSIS — C66.1 CANCER OF RIGHT URETER (HCC): Primary | ICD-10-CM

## 2020-12-11 PROCEDURE — 51720 TREATMENT OF BLADDER LESION: CPT

## 2020-12-11 RX ORDER — LIDOCAINE HYDROCHLORIDE 20 MG/ML
10 JELLY TOPICAL ONCE
Status: COMPLETED | OUTPATIENT
Start: 2020-12-11 | End: 2020-12-11

## 2020-12-11 RX ORDER — LIDOCAINE HYDROCHLORIDE 20 MG/ML
10 JELLY TOPICAL ONCE
Start: 2020-12-18 | End: 2020-12-18

## 2020-12-11 RX ADMIN — LIDOCAINE HYDROCHLORIDE 10 ML: 20 JELLY TOPICAL at 08:39:00

## 2020-12-11 NOTE — PROGRESS NOTES
Your recent urine culture is normal.  Recommend follow up in the office as directed.     Sincerely,  Kaylah Gonzalez MD

## 2020-12-17 ENCOUNTER — HOSPITAL ENCOUNTER (OUTPATIENT)
Dept: GENERAL RADIOLOGY | Age: 70
Discharge: HOME | DRG: 660 | End: 2020-12-17
Attending: UROLOGY
Payer: MEDICARE

## 2020-12-17 ENCOUNTER — LAB ENCOUNTER (OUTPATIENT)
Dept: LAB | Age: 70
DRG: 660 | End: 2020-12-17
Attending: UROLOGY
Payer: MEDICARE

## 2020-12-17 DIAGNOSIS — R35.0 FREQUENCY OF MICTURITION: ICD-10-CM

## 2020-12-17 DIAGNOSIS — C66.1 CANCER OF RIGHT URETER (HCC): ICD-10-CM

## 2020-12-17 DIAGNOSIS — R05.9 COUGH: ICD-10-CM

## 2020-12-17 DIAGNOSIS — R10.9 RIGHT FLANK PAIN: Primary | ICD-10-CM

## 2020-12-17 PROCEDURE — 71046 X-RAY EXAM CHEST 2 VIEWS: CPT | Performed by: UROLOGY

## 2020-12-17 PROCEDURE — 80048 BASIC METABOLIC PNL TOTAL CA: CPT

## 2020-12-17 PROCEDURE — 87186 SC STD MICRODIL/AGAR DIL: CPT | Performed by: UROLOGY

## 2020-12-17 PROCEDURE — 87077 CULTURE AEROBIC IDENTIFY: CPT | Performed by: UROLOGY

## 2020-12-17 PROCEDURE — 87086 URINE CULTURE/COLONY COUNT: CPT | Performed by: UROLOGY

## 2020-12-17 PROCEDURE — 81001 URINALYSIS AUTO W/SCOPE: CPT | Performed by: UROLOGY

## 2020-12-17 PROCEDURE — 36415 COLL VENOUS BLD VENIPUNCTURE: CPT

## 2020-12-17 PROCEDURE — 85025 COMPLETE CBC W/AUTO DIFF WBC: CPT

## 2020-12-18 ENCOUNTER — HOSPITAL ENCOUNTER (OUTPATIENT)
Dept: ULTRASOUND IMAGING | Age: 70
Discharge: HOME | End: 2020-12-18
Attending: UROLOGY
Payer: MEDICARE

## 2020-12-18 ENCOUNTER — ANESTHESIA EVENT (OUTPATIENT)
Dept: SURGERY | Facility: HOSPITAL | Age: 70
DRG: 660 | End: 2020-12-18
Payer: MEDICARE

## 2020-12-18 ENCOUNTER — APPOINTMENT (OUTPATIENT)
Dept: HEMATOLOGY/ONCOLOGY | Facility: HOSPITAL | Age: 70
End: 2020-12-18
Attending: UROLOGY
Payer: MEDICARE

## 2020-12-18 ENCOUNTER — HOSPITAL ENCOUNTER (INPATIENT)
Facility: HOSPITAL | Age: 70
LOS: 3 days | Discharge: HOME | DRG: 660 | End: 2020-12-21
Attending: EMERGENCY MEDICINE | Admitting: HOSPITALIST
Payer: MEDICARE

## 2020-12-18 DIAGNOSIS — R10.9 RIGHT FLANK PAIN: ICD-10-CM

## 2020-12-18 DIAGNOSIS — N13.30 HYDRONEPHROSIS: ICD-10-CM

## 2020-12-18 DIAGNOSIS — N39.0 URINARY TRACT INFECTION WITHOUT HEMATURIA, SITE UNSPECIFIED: Primary | ICD-10-CM

## 2020-12-18 DIAGNOSIS — R10.9 FLANK PAIN: ICD-10-CM

## 2020-12-18 PROCEDURE — 76770 US EXAM ABDO BACK WALL COMP: CPT | Performed by: UROLOGY

## 2020-12-18 PROCEDURE — 99223 1ST HOSP IP/OBS HIGH 75: CPT | Performed by: HOSPITALIST

## 2020-12-18 RX ORDER — ONDANSETRON 2 MG/ML
4 INJECTION INTRAMUSCULAR; INTRAVENOUS EVERY 6 HOURS PRN
Status: DISCONTINUED | OUTPATIENT
Start: 2020-12-18 | End: 2020-12-21

## 2020-12-18 RX ORDER — BENZONATATE 200 MG/1
200 CAPSULE ORAL 3 TIMES DAILY PRN
Status: DISCONTINUED | OUTPATIENT
Start: 2020-12-18 | End: 2020-12-21

## 2020-12-18 RX ORDER — FINASTERIDE 5 MG/1
5 TABLET, FILM COATED ORAL NIGHTLY
Status: DISCONTINUED | OUTPATIENT
Start: 2020-12-18 | End: 2020-12-21

## 2020-12-18 RX ORDER — ASPIRIN 81 MG/1
81 TABLET ORAL DAILY
COMMUNITY
End: 2021-11-02

## 2020-12-18 RX ORDER — MORPHINE SULFATE 4 MG/ML
4 INJECTION, SOLUTION INTRAMUSCULAR; INTRAVENOUS ONCE
Status: COMPLETED | OUTPATIENT
Start: 2020-12-18 | End: 2020-12-18

## 2020-12-18 RX ORDER — CETIRIZINE HYDROCHLORIDE 10 MG/1
10 TABLET ORAL DAILY
COMMUNITY

## 2020-12-18 RX ORDER — ROSUVASTATIN CALCIUM 20 MG/1
20 TABLET, COATED ORAL NIGHTLY
Status: DISCONTINUED | OUTPATIENT
Start: 2020-12-18 | End: 2020-12-21

## 2020-12-18 RX ORDER — FLUTICASONE PROPIONATE 50 MCG
1 SPRAY, SUSPENSION (ML) NASAL DAILY
Status: DISCONTINUED | OUTPATIENT
Start: 2020-12-18 | End: 2020-12-21

## 2020-12-18 RX ORDER — HEPARIN SODIUM 5000 [USP'U]/ML
5000 INJECTION, SOLUTION INTRAVENOUS; SUBCUTANEOUS EVERY 12 HOURS SCHEDULED
Status: DISCONTINUED | OUTPATIENT
Start: 2020-12-19 | End: 2020-12-21

## 2020-12-18 RX ORDER — CETIRIZINE HYDROCHLORIDE 10 MG/1
10 TABLET ORAL DAILY
Status: DISCONTINUED | OUTPATIENT
Start: 2020-12-18 | End: 2020-12-21

## 2020-12-18 RX ORDER — METOCLOPRAMIDE HYDROCHLORIDE 5 MG/ML
10 INJECTION INTRAMUSCULAR; INTRAVENOUS EVERY 8 HOURS PRN
Status: DISCONTINUED | OUTPATIENT
Start: 2020-12-18 | End: 2020-12-21

## 2020-12-18 RX ORDER — ONDANSETRON 2 MG/ML
4 INJECTION INTRAMUSCULAR; INTRAVENOUS ONCE
Status: COMPLETED | OUTPATIENT
Start: 2020-12-18 | End: 2020-12-18

## 2020-12-18 RX ORDER — SODIUM CHLORIDE 9 MG/ML
INJECTION, SOLUTION INTRAVENOUS CONTINUOUS
Status: DISCONTINUED | OUTPATIENT
Start: 2020-12-18 | End: 2020-12-21

## 2020-12-18 RX ORDER — ACETAMINOPHEN 325 MG/1
650 TABLET ORAL EVERY 6 HOURS PRN
Status: DISCONTINUED | OUTPATIENT
Start: 2020-12-18 | End: 2020-12-21

## 2020-12-18 RX ORDER — TAMSULOSIN HYDROCHLORIDE 0.4 MG/1
0.8 CAPSULE ORAL DAILY
Status: DISCONTINUED | OUTPATIENT
Start: 2020-12-19 | End: 2020-12-21

## 2020-12-18 RX ORDER — ASPIRIN 81 MG/1
81 TABLET ORAL DAILY
Status: DISCONTINUED | OUTPATIENT
Start: 2020-12-18 | End: 2020-12-21

## 2020-12-18 NOTE — PROGRESS NOTES
Your recent chest x-ray is normal.  Recommend follow up in the office as directed.     Sincerely,  Flavio Enciso MD

## 2020-12-18 NOTE — ED INITIAL ASSESSMENT (HPI)
Pt sent by pcp d/t infected stent in urethra, pt is to have surgery and instructed to go to er for iv antibiotics and hydration d/t urospesis, pt pmhx hld, elevated bph, sleep apnea, ptaox4, pt c/o 5/10 pain, pt able to provide urine sample, pt straight ca

## 2020-12-18 NOTE — PROGRESS NOTES
Your recent renal ultrasound shows no hydronephrosis and is normal.  Bladder empties pretty well, only 3 ounce postvoid residual.  Recommend follow up in the office as directed.     Sincerely,  Toni Mitchell MD

## 2020-12-19 ENCOUNTER — ANESTHESIA (OUTPATIENT)
Dept: SURGERY | Facility: HOSPITAL | Age: 70
DRG: 660 | End: 2020-12-19
Payer: MEDICARE

## 2020-12-19 ENCOUNTER — APPOINTMENT (OUTPATIENT)
Dept: GENERAL RADIOLOGY | Facility: HOSPITAL | Age: 70
DRG: 660 | End: 2020-12-19
Attending: INTERNAL MEDICINE
Payer: MEDICARE

## 2020-12-19 PROCEDURE — 99223 1ST HOSP IP/OBS HIGH 75: CPT | Performed by: UROLOGY

## 2020-12-19 PROCEDURE — BT1D0ZZ FLUOROSCOPY OF RIGHT KIDNEY, URETER AND BLADDER USING HIGH OSMOLAR CONTRAST: ICD-10-PCS | Performed by: UROLOGY

## 2020-12-19 PROCEDURE — 99232 SBSQ HOSP IP/OBS MODERATE 35: CPT | Performed by: INTERNAL MEDICINE

## 2020-12-19 PROCEDURE — 0TP98DZ REMOVAL OF INTRALUMINAL DEVICE FROM URETER, VIA NATURAL OR ARTIFICIAL OPENING ENDOSCOPIC: ICD-10-PCS | Performed by: UROLOGY

## 2020-12-19 PROCEDURE — 52332 CYSTOSCOPY AND TREATMENT: CPT | Performed by: UROLOGY

## 2020-12-19 PROCEDURE — 0T768DZ DILATION OF RIGHT URETER WITH INTRALUMINAL DEVICE, VIA NATURAL OR ARTIFICIAL OPENING ENDOSCOPIC: ICD-10-PCS | Performed by: UROLOGY

## 2020-12-19 PROCEDURE — 0TBD8ZX EXCISION OF URETHRA, VIA NATURAL OR ARTIFICIAL OPENING ENDOSCOPIC, DIAGNOSTIC: ICD-10-PCS | Performed by: UROLOGY

## 2020-12-19 PROCEDURE — 0TBB8ZX EXCISION OF BLADDER, VIA NATURAL OR ARTIFICIAL OPENING ENDOSCOPIC, DIAGNOSTIC: ICD-10-PCS | Performed by: UROLOGY

## 2020-12-19 PROCEDURE — 0T9B80Z DRAINAGE OF BLADDER WITH DRAINAGE DEVICE, VIA NATURAL OR ARTIFICIAL OPENING ENDOSCOPIC: ICD-10-PCS | Performed by: UROLOGY

## 2020-12-19 PROCEDURE — 52204 CYSTOSCOPY W/BIOPSY(S): CPT | Performed by: UROLOGY

## 2020-12-19 DEVICE — URETERAL STENT
Type: IMPLANTABLE DEVICE | Site: URETER | Status: FUNCTIONAL
Brand: ASCERTA™

## 2020-12-19 RX ORDER — ONDANSETRON 2 MG/ML
INJECTION INTRAMUSCULAR; INTRAVENOUS AS NEEDED
Status: DISCONTINUED | OUTPATIENT
Start: 2020-12-19 | End: 2020-12-19 | Stop reason: SURG

## 2020-12-19 RX ORDER — EPHEDRINE SULFATE 50 MG/ML
INJECTION INTRAVENOUS AS NEEDED
Status: DISCONTINUED | OUTPATIENT
Start: 2020-12-19 | End: 2020-12-19 | Stop reason: SURG

## 2020-12-19 RX ORDER — SODIUM CHLORIDE, SODIUM LACTATE, POTASSIUM CHLORIDE, CALCIUM CHLORIDE 600; 310; 30; 20 MG/100ML; MG/100ML; MG/100ML; MG/100ML
INJECTION, SOLUTION INTRAVENOUS CONTINUOUS
Status: DISCONTINUED | OUTPATIENT
Start: 2020-12-19 | End: 2020-12-19 | Stop reason: HOSPADM

## 2020-12-19 RX ORDER — LIDOCAINE HYDROCHLORIDE 10 MG/ML
INJECTION, SOLUTION EPIDURAL; INFILTRATION; INTRACAUDAL; PERINEURAL AS NEEDED
Status: DISCONTINUED | OUTPATIENT
Start: 2020-12-19 | End: 2020-12-19 | Stop reason: SURG

## 2020-12-19 RX ORDER — LIDOCAINE HYDROCHLORIDE 20 MG/ML
JELLY TOPICAL AS NEEDED
Status: DISCONTINUED | OUTPATIENT
Start: 2020-12-19 | End: 2020-12-19 | Stop reason: HOSPADM

## 2020-12-19 RX ORDER — NALOXONE HYDROCHLORIDE 0.4 MG/ML
80 INJECTION, SOLUTION INTRAMUSCULAR; INTRAVENOUS; SUBCUTANEOUS AS NEEDED
Status: DISCONTINUED | OUTPATIENT
Start: 2020-12-19 | End: 2020-12-19 | Stop reason: HOSPADM

## 2020-12-19 RX ORDER — ONDANSETRON 2 MG/ML
4 INJECTION INTRAMUSCULAR; INTRAVENOUS AS NEEDED
Status: DISCONTINUED | OUTPATIENT
Start: 2020-12-19 | End: 2020-12-19 | Stop reason: HOSPADM

## 2020-12-19 RX ORDER — DEXAMETHASONE SODIUM PHOSPHATE 4 MG/ML
VIAL (ML) INJECTION AS NEEDED
Status: DISCONTINUED | OUTPATIENT
Start: 2020-12-19 | End: 2020-12-19 | Stop reason: SURG

## 2020-12-19 RX ORDER — HYDROMORPHONE HYDROCHLORIDE 1 MG/ML
0.5 INJECTION, SOLUTION INTRAMUSCULAR; INTRAVENOUS; SUBCUTANEOUS EVERY 5 MIN PRN
Status: DISCONTINUED | OUTPATIENT
Start: 2020-12-19 | End: 2020-12-19 | Stop reason: HOSPADM

## 2020-12-19 RX ADMIN — ONDANSETRON 4 MG: 2 INJECTION INTRAMUSCULAR; INTRAVENOUS at 08:00:00

## 2020-12-19 RX ADMIN — EPHEDRINE SULFATE 10 MG: 50 INJECTION INTRAVENOUS at 08:00:00

## 2020-12-19 RX ADMIN — EPHEDRINE SULFATE 10 MG: 50 INJECTION INTRAVENOUS at 08:12:00

## 2020-12-19 RX ADMIN — LIDOCAINE HYDROCHLORIDE 50 MG: 10 INJECTION, SOLUTION EPIDURAL; INFILTRATION; INTRACAUDAL; PERINEURAL at 07:56:00

## 2020-12-19 RX ADMIN — SODIUM CHLORIDE: 9 INJECTION, SOLUTION INTRAVENOUS at 07:50:00

## 2020-12-19 RX ADMIN — DEXAMETHASONE SODIUM PHOSPHATE 4 MG: 4 MG/ML VIAL (ML) INJECTION at 08:00:00

## 2020-12-19 NOTE — H&P
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 Respiratory: Clear to auscultation bilaterally. No wheezes. No rhonchi. Cardiovascular: S1, S2. Regular rate and rhythm. No murmurs, rubs or gallops. Equal pulses. Chest and Back: No tenderness or deformity.   Abdomen: Soft, n negative, may discontinue isolation: yes     Plan of care discussed with patient.      Aram Stover DO  12/18/2020

## 2020-12-19 NOTE — CONSULTS
[facility]     Report of Consultation    [address]  [phone]       Radha Monson Patient Status:  Inpatient    1/3/1950 MRN [id number]   [facility] 3NE-A Attending Phan Orellana, DO   Hosp Day # 1 PCP Madi Mcfarland very uncomfortable and inserted a Baptiste catheter himself which was indwelling for several days after therapy.   Because of the patient's persistent symptoms, fevers chills with pyuria and leukocytosis he is brought to the operating room for cystoscopy, tee MAIN OR   • CYSTOSCOPY URETEROSCOPY Bilateral 7/1/2020    Performed by Min Bonilla MD at [address] [facility] MAIN OR   • [address]  05/2019   • Kareen      with biopsy, may 2019   • FOREARM/WRIST SURGERY UNLISTED mg, 0.8 mg, Oral, Daily  •  0.9% NaCl infusion, , Intravenous, Continuous  •  Heparin Sodium (Porcine) 5000 UNIT/ML injection 5,000 Units, 5,000 Units, Subcutaneous, Q12H [address]  •  acetaminophen (TYLENOL) tab 650 mg, 650 mg, Oral, Q6H PRN  •  ondansetron HCl 12/18/2020     Lab Results   Component Value Date    WBC 12.4 12/19/2020    HGB 11.9 12/19/2020    .0 12/19/2020    CREATSERUM 1.36 12/18/2020     12/18/2020    K 4.1 12/18/2020    CO2 26.0 12/18/2020     12/18/2020    CA 9.3 12/18/2020 calculus is evident. LEFT KIDNEY: Measures 12.3 cm in length and demonstrates normal cortical architecture and parenchymal echogenicity. There is no evidence of hydronephrosis, echogenic, shadowing calculus, or solid mass.  BLADDER: Incompletely distended w

## 2020-12-19 NOTE — ANESTHESIA PROCEDURE NOTES
Airway  Urgency: elective      General Information and Staff    Patient location during procedure: OR  Anesthesiologist: Ishmael Cotton MD  Performed: anesthesiologist     Indications and Patient Condition  Indications for airway management: anesthesia

## 2020-12-19 NOTE — PROGRESS NOTES
[facility]  Progress Note    Snehal Zelaya Patient Status:  Inpatient    1/3/1950 MRN [id number]   [place] [facility] 3NE-A Attending Chayo Rome MD   Hosp Day # 1 PCP Bryanna Lemus MD     CC: Fever, chills    SUBJECTIVE:  Had fe 1.33 12/19/2020    BUN 23 12/19/2020     12/19/2020    K 4.3 12/19/2020     12/19/2020    CO2 26.0 12/19/2020     12/19/2020    CA 8.9 12/19/2020    ALB 3.3 12/18/2020    ALKPHO 126 12/18/2020    BILT 0.4 12/18/2020    TP 8.3 12/18/2020 surgical intervention in July with chronic indwelling ureteral stent an on intervesical immunotherapy with BCG  1. Patient follows up with [facility] for this  2. Urology is following  3. Obstructive sleep apnea  1. ADOLPH protocol  4.  Acute kidney injury d

## 2020-12-19 NOTE — RESPIRATORY THERAPY NOTE
Patient has hx of ADOLPH and uses CPAP at home, pt doesn't want to use CPAP at this time. Flu swab done, PPE  Worn, sample transported to lab.

## 2020-12-19 NOTE — ED PROVIDER NOTES
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     Physical Exam     ED Triage Vitals [12/18/20 1752]   /81   Pulse 81   Resp 18   Temp 98.8 °F (37.1 °C)   Temp src Temporal   SpO2 95 %   O2 Device None (Room air)       Current:/78   Pulse 76   Temp 98.8 °F (37.1 °C) ( 38.7 (*)     Neutrophil Absolute Prelim 8.55 (*)     Neutrophil Absolute 8.55 (*)     Monocyte Absolute 1.31 (*)     All other components within normal limits   LACTIC ACID, PLASMA - Normal   RAPID SARS-COV-2 BY PCR - Normal   CBC WITH DIFFERENTIAL WITH PL 2014-UFCT OK 2007-[facility] 2014         Urinary tract infection without hematuria N39.0 12/18/2020 Unknown

## 2020-12-19 NOTE — PLAN OF CARE
NURSING ADMISSION NOTE      Patient admitted via Cart  Oriented to room. Safety precautions initiated. Bed in low position. Call light in reach. Patient is A&O x4.   Calm and cooperative. Febrile. VSS. On RA. IV-0.9%  ml/hr. IV abx.

## 2020-12-19 NOTE — ANESTHESIA PREPROCEDURE EVALUATION
PRE-OP EVALUATION    Patient Name: Keegan Villasenor    Pre-op Diagnosis: Hydronephrosis [N13.30]    Procedure(s):  CYSTOSCOPY, RETROGRADE, PYELOGRAM,  EXCHANGE RIGHT URETERAL STENT     Surgeon(s) and Role:     * Yoselyn Mcclellan MD - Primary    Pre- [MAR Hold] aspirin EC tab 81 mg, 81 mg, Oral, Daily        Outpatient Medications:     •  BCG Live 50 mg, 50 mg, Bladder Instillation, Once, Balaji Blanco MD      •  aspirin EC 81 MG Oral Tab EC, Take 81 mg by mouth daily. , Disp: , Rfl: , 12/17/2020 a Laterality Date   • CATARACT  11/2012   • CYSTOSCOPY STENT INSERTION N/A 7/1/2020    Performed by Neha Zhou MD at [address] [facility] OR   • CYSTOSCOPY URETEROSCOPY Bilateral 7/1/2020    Performed by Neha Zhou MD at [address] [facility] [address]   • [address] patient meets guidelines. Patient has taken beta blockers in last 24 hours. Post-procedure pain management plan discussed with surgeon and patient.     Comment: Discussed with the patient risk of PONV, sore throat, oral/dental injury, and serious complica

## 2020-12-19 NOTE — PROGRESS NOTES
[facility] Pharmacy Note: Antimicrobial Weight Based Dose Adjustment for: ceftriaxone (ROCEPHIN)    Jose Roberto Benito is a 79year old patient who has been prescribed ceftriaxone (ROCEPHIN) 1 g x 1 dose.       Estimated Creatinine Clearance: 51.9 mL/min (A) (b

## 2020-12-19 NOTE — ANESTHESIA POSTPROCEDURE EVALUATION
[facility] Patient Status:  Inpatient   Age/Gender 79year old male MRN [id number]   Location [address] [facility] Attending Tanya Blanc MD   [facility] Day # 1 PCP Dago Jung MD       Anesthesia Post-op

## 2020-12-19 NOTE — OPERATIVE REPORT
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 It was noted that Dr. Brad Walter was marked on the right, the correct side.   Then the 25 Filipino cystoscope was introduced into the distal urethra at which time small filamentous projections were identified in the pendulous urethra and several Cold cup biopsies were taken from the area of the right ureteral orifice and the site was then fulgurated. As I took a biopsy some creamy material drained from that site.   20 cc of 2% viscous Xylocaine was injected into the urethra and then a 16 Western Rashmi Fo

## 2020-12-19 NOTE — PLAN OF CARE
Assumed care at 0730. A&O x 4. On RA tolerating well. NSR on tele. Patient came back from cysto and stent exchange increased his diet to soft diet, tolerating well. No nausea or pain. Baptiste in place. Will stay overnight for fluids and IV ABT.  Staff will co

## 2020-12-20 ENCOUNTER — APPOINTMENT (OUTPATIENT)
Dept: ULTRASOUND IMAGING | Facility: HOSPITAL | Age: 70
DRG: 660 | End: 2020-12-20
Attending: UROLOGY
Payer: MEDICARE

## 2020-12-20 PROBLEM — D49.59: Status: ACTIVE | Noted: 2020-07-21

## 2020-12-20 PROCEDURE — 76775 US EXAM ABDO BACK WALL LIM: CPT | Performed by: UROLOGY

## 2020-12-20 PROCEDURE — 99233 SBSQ HOSP IP/OBS HIGH 50: CPT | Performed by: UROLOGY

## 2020-12-20 PROCEDURE — 99232 SBSQ HOSP IP/OBS MODERATE 35: CPT | Performed by: INTERNAL MEDICINE

## 2020-12-20 RX ORDER — MAGNESIUM HYDROXIDE/ALUMINUM HYDROXICE/SIMETHICONE 120; 1200; 1200 MG/30ML; MG/30ML; MG/30ML
30 SUSPENSION ORAL 4 TIMES DAILY PRN
Status: DISCONTINUED | OUTPATIENT
Start: 2020-12-20 | End: 2020-12-21

## 2020-12-20 RX ORDER — ZOLPIDEM TARTRATE 5 MG/1
5 TABLET ORAL NIGHTLY PRN
Status: DISCONTINUED | OUTPATIENT
Start: 2020-12-20 | End: 2020-12-21

## 2020-12-20 NOTE — PROGRESS NOTES
[facility]  Progress Note    Meggan Catalina Patient Status:  Inpatient    1/3/1950 MRN [id number]   [facility] 3NE-A Attending Dacia Hester MD   Hosp Day # 2 PCP Farheen Lau MD     CC: Fever, chills    SUBJECTIVE:  Kirti Padilla •  Alum & Mag Hydroxide-Simeth (MAALOX) oral suspension 30 mL, 30 mL, Oral, QID PRN    •  finasteride (PROSCAR) tab 5 mg, 5 mg, Oral, Nightly    •  Rosuvastatin Calcium (CRESTOR) tab 20 mg, 20 mg, Oral, Nightly    •  Sertraline HCl (ZOLOFT) tab 50 mg, with acute pyelonephritis and right hydronephrosis in the setting of ureteral stent  1. IV fluids  2.  Seen by urology and status post cystoscopy and right retrograde pyelogram and exchange of the right ureteral stent and ureteral biopsy and bladder biopsy

## 2020-12-20 NOTE — PLAN OF CARE
Care of pt taken over at approximately 1630. Pt is a/ox4, VSS, on RA, NSR on telemetry. Pt with no c/o pain. Baptiste catheter in place. Tolerating diet, advanced to regular. IVF running at 100mL/hr. Pt updated on POC. Will continue to monitor. Transposition Flap Text: The defect edges were debeveled with a #15 scalpel blade.  Given the location of the defect and the proximity to free margins a transposition flap was deemed most appropriate.  Using a sterile surgical marker, an appropriate transposition flap was drawn incorporating the defect.    The area thus outlined was incised deep to adipose tissue with a #15 scalpel blade.  The skin margins were undermined to an appropriate distance in all directions utilizing iris scissors.

## 2020-12-20 NOTE — PROGRESS NOTES
Lowell Arzola is a 79year old male.     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 Zakiya Schmitz perform cystoscopy with replacement of right ureteral stent. 12/17/2020 urine culture staph epidermidis; on IV cefazolin    12/20/2020 patient reports no flank pain or fevers or chills. Baptitse catheter draining well. No suprapubic pain.   Ap URETEROSCOPY Bilateral 7/1/2020    Performed by Manjit Tompkins MD at [address] [facility] MAIN OR   • [address]  05/2019   • Kareen      with biopsy, may 2019   • FOREARM/WRIST SURGERY UNLISTED  1997    L wrist fx, OR Intravenous, Continuous  •  Heparin Sodium (Porcine) 5000 UNIT/ML injection 5,000 Units, 5,000 Units, Subcutaneous, Q12H [facility]  •  acetaminophen (TYLENOL) tab 650 mg, 650 mg, Oral, Q6H PRN  •  ondansetron HCl (ZOFRAN) injection 4 mg, 4 mg, Intravenous, Q6H P 12/19/20  0818   URINECUL No Growth at 18-24 hrs. No Growth at 18-24 hrs. No Growth at 18-24 hrs. No Growth at 18-24 hrs. No Growth at 18-24 hrs. No Growth at 18-24 hrs. No Growth 2 Days No Growth 2 Days No Growth 2 Days No Growth at 18-24 hrs.  No Growth 2 stent is seen extending into the bladder lumen. Mild circumferential bladder wall thickening may relate to underdistention. On color Doppler interrogation, bilateral ureteral jets are seen. The prevoid volume is 245 mL. The postvoid residual is 94 mL.    CO prostate and has a previous history of transurethral incision of the prostate. He has been known to have residuals of about 300 cc when catheterized at the time of BCG installation although residual on ultrasound was measured as only 94 on 12/18/2020.   Po kidneys  6. Creatinine, GFR in a.m, 5 AM blood draw.; if renal function improving, consider Baptiste catheter removal for voiding trial with bladder scan residual afterwards  7.   If renal function 12/21/2020 a.m. no better, consider leaving Baptiste catheter in

## 2020-12-20 NOTE — PLAN OF CARE
Patient alert and oriented x4, VS stable, RA, , NSR on tele  Complaint of mild chronic back pain in the morning, Tylenol administered per [facility]  Baptiste d/c/I, good urine output  IVABX  IVF   SCD's  Patient is ambulatory to the bathroom, gen weakness noted

## 2020-12-20 NOTE — PLAN OF CARE
Patient is A&Ox4  Denies any pain or discomfort  Complains heartburn and indigestion - one time Protonix and PRN maalox given  No n/v/d  Afebrile, VSS  IVF infusing  Baptiste cath intact, draining clear yellow urine  On IV ancef   Still waiting for urine cult

## 2020-12-21 ENCOUNTER — APPOINTMENT (OUTPATIENT)
Dept: ULTRASOUND IMAGING | Facility: HOSPITAL | Age: 70
DRG: 660 | End: 2020-12-21
Attending: INTERNAL MEDICINE
Payer: MEDICARE

## 2020-12-21 ENCOUNTER — TELEPHONE (OUTPATIENT)
Dept: SURGERY | Facility: CLINIC | Age: 70
End: 2020-12-21

## 2020-12-21 VITALS
SYSTOLIC BLOOD PRESSURE: 140 MMHG | DIASTOLIC BLOOD PRESSURE: 70 MMHG | BODY MASS INDEX: 34 KG/M2 | RESPIRATION RATE: 19 BRPM | HEART RATE: 60 BPM | WEIGHT: 246.06 LBS | TEMPERATURE: 98 F | OXYGEN SATURATION: 98 %

## 2020-12-21 PROCEDURE — 99239 HOSP IP/OBS DSCHRG MGMT >30: CPT | Performed by: INTERNAL MEDICINE

## 2020-12-21 PROCEDURE — 99221 1ST HOSP IP/OBS SF/LOW 40: CPT | Performed by: INTERNAL MEDICINE

## 2020-12-21 RX ORDER — TADALAFIL 5 MG/1
5 TABLET ORAL
Qty: 30 TABLET | Refills: 11 | Status: SHIPPED | OUTPATIENT
Start: 2020-12-21 | End: 2020-12-21

## 2020-12-21 RX ORDER — MELATONIN
325
Qty: 30 TABLET | Refills: 0 | Status: SHIPPED | OUTPATIENT
Start: 2020-12-21 | End: 2021-03-03 | Stop reason: ALTCHOICE

## 2020-12-21 RX ORDER — MELATONIN
325
Status: DISCONTINUED | OUTPATIENT
Start: 2020-12-21 | End: 2020-12-21

## 2020-12-21 RX ORDER — CEPHALEXIN 500 MG/1
500 CAPSULE ORAL 3 TIMES DAILY
Qty: 21 CAPSULE | Refills: 0 | Status: SHIPPED | OUTPATIENT
Start: 2020-12-21 | End: 2020-12-28

## 2020-12-21 RX ORDER — TADALAFIL 5 MG/1
5 TABLET ORAL
Qty: 30 TABLET | Refills: 11 | Status: SHIPPED | OUTPATIENT
Start: 2020-12-21 | End: 2021-01-20

## 2020-12-21 NOTE — PROGRESS NOTES
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 [facility]  2. Urology is following  3.  Ob Home      Questions/concerns and Plan of care were discussed with patient and  RN.   Follow-up with nephrology and urology as directed, follow-up with regular outpatient primary care physician Mehrdad Ledbetter MD within 1 week in office    Germain Ryan MD

## 2020-12-21 NOTE — PROGRESS NOTES
NURSING DISCHARGE NOTE    Discharged Home via Ambulatory. Accompanied by Support staff  Belongings Taken by patient/family. Reviewed med rec and d/c instructions- verbalized understanding. Removed PIV and continuous monitoring.  Pt left the unit in

## 2020-12-21 NOTE — PLAN OF CARE
Problem: Patient/Family Goals  Goal: Patient/Family Long Term Goal  Description: Patient's Long Term Goal: go home    Interventions:  - consult nephrology  - See additional Care Plan goals for specific interventions  Outcome: Progressing  Goal: Patient/F

## 2020-12-21 NOTE — PROGRESS NOTES
Patient was comfortable during the night. Some low back pain probably from being bedbound for several days. Urine is clear joellen. We reviewed the stabilization of the creatinine tween 1.3 and 1.4. Follow-up ultrasound shows the kidney is decompressed.

## 2020-12-21 NOTE — PLAN OF CARE
Pt is aox 4. States pain is 5/10 - mostly back d/t bed. Medicated with tylenol. Medicated per orders. Baptiste to gravity. Good output. Scds. IV fluids. Tolerating diet.   Up with assist.    Problem: SAFETY ADULT - FALL  Goal: Free from fall injury  [place]

## 2020-12-21 NOTE — CM/SW NOTE
12/21/20 1000   CM/SW Screening   Referral Source Social Work (self-referral)   Information Source Chart review;[facility] staff   Patient's Mental Status Alert;Oriented   Patient's [address]   Patient lives with Spouse   Patient Status Prior to A

## 2020-12-21 NOTE — CONSULTS
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 He has never used smokeless tobacco. He reports current alcohol use. He reports that he does not use drugs.     Allergies:    Gramineae Pollens       Runny nose, Coughing, OTHER (SEE                            COMMENTS)    Comment:Also trees daily as needed for Erectile Dysfunction. Review of Systems:  Please see HPI for pertinent positives. 10 point review of systems otherwise reviewed and negative.      Physical Exam:  /70 (BP Location: Left arm)   Pulse 55   Temp 98.1 °F (36.7 °

## 2020-12-21 NOTE — DISCHARGE SUMMARY
[facility]  Discharge Summary    Sarah Glez Patient Status:  Inpatient    1/3/1950 MRN [id number]   [facility] 3NE-A Attending Floyd Healy MD   [facility] Day # 3 PCP Demetrio Monge MD     Date of Admission: 2020    Chaka Please refer to history and physical done by Dr. Elina Bermudez for details on admission     Brief Synopsis:     UA positive on admission, patient had a urine culture done which resulted as below  URINE CULTURE 50,000-99,000 CFU/ML Staphylococcus epidermidis Creatinine 1.46 on 12/20/2020 and kidney and bladder ultrasound done which showed no hydronephrosis. Continued on IV fluids. Creatinine 1.42 today from 1.46 yesterday.   Seen by nephrologist and urologist.  Plan discharge today and follow-up with nephrol status post cystoscopy and right retrograde pyelogram and exchange of the right ureteral stent and ureteral biopsy and bladder biopsy done by urology Dr. Adolfo Goyal on 12/19/2020    Incidental or significant findings and recommendations (brief descript tamsulosin HCl 0.4 MG Caps  Commonly known as: FLOMAX      Take 2 capsules (0.8 mg total) by mouth daily. Quantity: 180 capsule  Refills: 0     VITAMIN D OR      Take 1 capsule by mouth daily.    Refills: 0           Where to Get Your Medications      The Skin: Skin color, texture, turgor normal. No rashes or lesions  Neurologic: Awake,alert,nonfocal  Psych: Mood and affect appears normal      Discharge Condition: stable    Patient Discharge Instructions: See electronic chart      More than 30 minutes on di

## 2020-12-22 NOTE — PROGRESS NOTES
Your recent bladder and urethral biopsy is normal.  Recommend follow up in the office as directed.     Sincerely,  Ruben Mckeon MD

## 2020-12-28 ENCOUNTER — LABORATORY ENCOUNTER (OUTPATIENT)
Dept: LAB | Age: 70
End: 2020-12-28
Attending: INTERNAL MEDICINE
Payer: MEDICARE

## 2020-12-28 DIAGNOSIS — N13.30 HYDRONEPHROSIS: ICD-10-CM

## 2020-12-28 DIAGNOSIS — R10.9 RIGHT FLANK PAIN: ICD-10-CM

## 2020-12-28 DIAGNOSIS — C66.2 CARCINOMA OF LEFT URETER (HCC): Primary | ICD-10-CM

## 2020-12-28 LAB
ANION GAP SERPL CALC-SCNC: 5 MMOL/L (ref 0–18)
BASOPHILS # BLD AUTO: 0.08 X10(3) UL (ref 0–0.2)
BASOPHILS NFR BLD AUTO: 1.2 %
BUN BLD-MCNC: 28 MG/DL (ref 7–18)
BUN/CREAT SERPL: 18.8 (ref 10–20)
CALCIUM BLD-MCNC: 9.7 MG/DL (ref 8.5–10.1)
CHLORIDE SERPL-SCNC: 108 MMOL/L (ref 98–112)
CO2 SERPL-SCNC: 25 MMOL/L (ref 21–32)
CREAT BLD-MCNC: 1.49 MG/DL
DEPRECATED RDW RBC AUTO: 42.3 FL (ref 35.1–46.3)
EOSINOPHIL # BLD AUTO: 0.29 X10(3) UL (ref 0–0.7)
EOSINOPHIL NFR BLD AUTO: 4.3 %
ERYTHROCYTE [DISTWIDTH] IN BLOOD BY AUTOMATED COUNT: 13.2 % (ref 11–15)
GLUCOSE BLD-MCNC: 116 MG/DL (ref 70–99)
HCT VFR BLD AUTO: 37.8 %
HGB BLD-MCNC: 12.2 G/DL
IMM GRANULOCYTES # BLD AUTO: 0.04 X10(3) UL (ref 0–1)
IMM GRANULOCYTES NFR BLD: 0.6 %
LYMPHOCYTES # BLD AUTO: 1.69 X10(3) UL (ref 1–4)
LYMPHOCYTES NFR BLD AUTO: 24.8 %
MCH RBC QN AUTO: 28.5 PG (ref 26–34)
MCHC RBC AUTO-ENTMCNC: 32.3 G/DL (ref 31–37)
MCV RBC AUTO: 88.3 FL
MONOCYTES # BLD AUTO: 0.68 X10(3) UL (ref 0.1–1)
MONOCYTES NFR BLD AUTO: 10 %
NEUTROPHILS # BLD AUTO: 4.04 X10 (3) UL (ref 1.5–7.7)
NEUTROPHILS # BLD AUTO: 4.04 X10(3) UL (ref 1.5–7.7)
NEUTROPHILS NFR BLD AUTO: 59.1 %
OSMOLALITY SERPL CALC.SUM OF ELEC: 292 MOSM/KG (ref 275–295)
PATIENT FASTING Y/N/NP: NO
PLATELET # BLD AUTO: 293 10(3)UL (ref 150–450)
POTASSIUM SERPL-SCNC: 3.9 MMOL/L (ref 3.5–5.1)
RBC # BLD AUTO: 4.28 X10(6)UL
SODIUM SERPL-SCNC: 138 MMOL/L (ref 136–145)
WBC # BLD AUTO: 6.8 X10(3) UL (ref 4–11)

## 2020-12-28 PROCEDURE — 80048 BASIC METABOLIC PNL TOTAL CA: CPT

## 2020-12-28 PROCEDURE — 36415 COLL VENOUS BLD VENIPUNCTURE: CPT

## 2020-12-28 PROCEDURE — 85025 COMPLETE CBC W/AUTO DIFF WBC: CPT

## 2021-02-01 DIAGNOSIS — Z23 NEED FOR VACCINATION: ICD-10-CM

## 2021-02-02 ENCOUNTER — IMMUNIZATION (OUTPATIENT)
Dept: LAB | Age: 71
End: 2021-02-02
Attending: HOSPITALIST
Payer: MEDICARE

## 2021-02-02 DIAGNOSIS — Z23 NEED FOR VACCINATION: Primary | ICD-10-CM

## 2021-02-02 PROCEDURE — 0001A SARSCOV2 VAC 30MCG/0.3ML IM: CPT

## 2021-02-23 ENCOUNTER — IMMUNIZATION (OUTPATIENT)
Dept: LAB | Age: 71
End: 2021-02-23
Attending: HOSPITALIST
Payer: MEDICARE

## 2021-02-23 DIAGNOSIS — Z23 NEED FOR VACCINATION: Primary | ICD-10-CM

## 2021-02-23 PROCEDURE — 0002A SARSCOV2 VAC 30MCG/0.3ML IM: CPT

## 2021-03-03 ENCOUNTER — OFFICE VISIT (OUTPATIENT)
Dept: INTERNAL MEDICINE CLINIC | Facility: CLINIC | Age: 71
End: 2021-03-03
Payer: MEDICARE

## 2021-03-03 VITALS
HEART RATE: 86 BPM | HEIGHT: 70.98 IN | SYSTOLIC BLOOD PRESSURE: 122 MMHG | BODY MASS INDEX: 35.14 KG/M2 | OXYGEN SATURATION: 96 % | DIASTOLIC BLOOD PRESSURE: 62 MMHG | WEIGHT: 251 LBS

## 2021-03-03 DIAGNOSIS — D64.9 ANEMIA, UNSPECIFIED TYPE: ICD-10-CM

## 2021-03-03 DIAGNOSIS — Z12.5 PROSTATE CANCER SCREENING: ICD-10-CM

## 2021-03-03 DIAGNOSIS — E78.5 DYSLIPIDEMIA: ICD-10-CM

## 2021-03-03 DIAGNOSIS — Z00.00 ROUTINE GENERAL MEDICAL EXAMINATION AT A HEALTH CARE FACILITY: Primary | ICD-10-CM

## 2021-03-03 PROCEDURE — G0439 PPPS, SUBSEQ VISIT: HCPCS | Performed by: INTERNAL MEDICINE

## 2021-03-03 RX ORDER — TAMSULOSIN HYDROCHLORIDE 0.4 MG/1
0.8 CAPSULE ORAL DAILY
Qty: 180 CAPSULE | Refills: 3 | Status: SHIPPED | OUTPATIENT
Start: 2021-03-03 | End: 2021-10-14 | Stop reason: ALTCHOICE

## 2021-03-03 RX ORDER — DUTASTERIDE 0.5 MG/1
0.5 CAPSULE, LIQUID FILLED ORAL NIGHTLY
Qty: 90 CAPSULE | Refills: 3 | Status: SHIPPED | OUTPATIENT
Start: 2021-03-03 | End: 2021-10-14 | Stop reason: ALTCHOICE

## 2021-03-03 RX ORDER — ROSUVASTATIN CALCIUM 20 MG/1
20 TABLET, COATED ORAL NIGHTLY
Qty: 90 TABLET | Refills: 3 | Status: SHIPPED | OUTPATIENT
Start: 2021-03-03

## 2021-03-08 NOTE — PROGRESS NOTES
HPI/Subjective:   Patient ID: Keegan Villasenor is a 70year old male.     HPI  Here for pe as new pt, right uretral cancer sp resection follows at [facility], feeling well, bph follows with ruolgoy, depression controlled, hyperchol  History/Other:   Review of HENT:      Head: Normocephalic and atraumatic. Cardiovascular:      Rate and Rhythm: Normal rate and regular rhythm. Pulses: Normal pulses. Heart sounds: Normal heart sounds. No murmur.    Pulmonary:      Effort: Pulmonary effort is normal.

## 2021-04-13 ENCOUNTER — LAB ENCOUNTER (OUTPATIENT)
Dept: LAB | Age: 71
End: 2021-04-13
Attending: INTERNAL MEDICINE
Payer: MEDICARE

## 2021-04-13 DIAGNOSIS — Z12.5 PROSTATE CANCER SCREENING: ICD-10-CM

## 2021-04-13 DIAGNOSIS — E78.5 DYSLIPIDEMIA: ICD-10-CM

## 2021-04-13 DIAGNOSIS — D64.9 ANEMIA, UNSPECIFIED TYPE: ICD-10-CM

## 2021-04-13 PROCEDURE — 83540 ASSAY OF IRON: CPT

## 2021-04-13 PROCEDURE — 80053 COMPREHEN METABOLIC PANEL: CPT

## 2021-04-13 PROCEDURE — 85025 COMPLETE CBC W/AUTO DIFF WBC: CPT

## 2021-04-13 PROCEDURE — 80061 LIPID PANEL: CPT

## 2021-04-13 PROCEDURE — 82728 ASSAY OF FERRITIN: CPT

## 2021-04-13 PROCEDURE — 36415 COLL VENOUS BLD VENIPUNCTURE: CPT

## 2021-04-13 PROCEDURE — 83550 IRON BINDING TEST: CPT

## 2021-04-16 ENCOUNTER — HOSPITAL ENCOUNTER (OUTPATIENT)
Dept: ULTRASOUND IMAGING | Age: 71
Discharge: HOME | End: 2021-04-16
Attending: UROLOGY
Payer: MEDICARE

## 2021-04-16 ENCOUNTER — LAB ENCOUNTER (OUTPATIENT)
Dept: LAB | Age: 71
End: 2021-04-16
Attending: UROLOGY
Payer: MEDICARE

## 2021-04-16 DIAGNOSIS — R79.89 ELEVATED SERUM CREATININE: ICD-10-CM

## 2021-04-16 PROCEDURE — 80048 BASIC METABOLIC PNL TOTAL CA: CPT

## 2021-04-16 PROCEDURE — 76770 US EXAM ABDO BACK WALL COMP: CPT | Performed by: UROLOGY

## 2021-04-16 PROCEDURE — 36415 COLL VENOUS BLD VENIPUNCTURE: CPT

## 2021-07-19 ENCOUNTER — TELEPHONE (OUTPATIENT)
Dept: INTERNAL MEDICINE CLINIC | Facility: CLINIC | Age: 71
End: 2021-07-19

## 2021-07-19 ENCOUNTER — APPOINTMENT (OUTPATIENT)
Dept: GENERAL RADIOLOGY | Facility: HOSPITAL | Age: 71
End: 2021-07-19
Attending: EMERGENCY MEDICINE
Payer: MEDICARE

## 2021-07-19 ENCOUNTER — APPOINTMENT (OUTPATIENT)
Dept: MRI IMAGING | Facility: HOSPITAL | Age: 71
End: 2021-07-19
Attending: EMERGENCY MEDICINE
Payer: MEDICARE

## 2021-07-19 ENCOUNTER — HOSPITAL ENCOUNTER (EMERGENCY)
Facility: HOSPITAL | Age: 71
Discharge: HOME | End: 2021-07-19
Attending: EMERGENCY MEDICINE
Payer: MEDICARE

## 2021-07-19 VITALS
RESPIRATION RATE: 18 BRPM | TEMPERATURE: 99 F | WEIGHT: 240 LBS | BODY MASS INDEX: 33.6 KG/M2 | OXYGEN SATURATION: 96 % | HEIGHT: 71 IN | DIASTOLIC BLOOD PRESSURE: 81 MMHG | SYSTOLIC BLOOD PRESSURE: 132 MMHG | HEART RATE: 57 BPM

## 2021-07-19 DIAGNOSIS — R42 DIZZINESS: ICD-10-CM

## 2021-07-19 DIAGNOSIS — S40.012A CONTUSION OF LEFT SHOULDER, INITIAL ENCOUNTER: ICD-10-CM

## 2021-07-19 DIAGNOSIS — N30.01 ACUTE CYSTITIS WITH HEMATURIA: Primary | ICD-10-CM

## 2021-07-19 LAB
ALBUMIN SERPL-MCNC: 3.6 G/DL (ref 3.4–5)
ALBUMIN/GLOB SERPL: 0.9 {RATIO} (ref 1–2)
ALP LIVER SERPL-CCNC: 85 U/L
ALT SERPL-CCNC: 24 U/L
ANION GAP SERPL CALC-SCNC: 5 MMOL/L (ref 0–18)
AST SERPL-CCNC: 20 U/L (ref 15–37)
BASOPHILS # BLD AUTO: 0.04 X10(3) UL (ref 0–0.2)
BASOPHILS NFR BLD AUTO: 0.5 %
BILIRUB SERPL-MCNC: 0.5 MG/DL (ref 0.1–2)
BILIRUB UR QL STRIP.AUTO: NEGATIVE
BUN BLD-MCNC: 22 MG/DL (ref 7–18)
BUN/CREAT SERPL: 18.3 (ref 10–20)
CALCIUM BLD-MCNC: 8.8 MG/DL (ref 8.5–10.1)
CHLORIDE SERPL-SCNC: 105 MMOL/L (ref 98–112)
CO2 SERPL-SCNC: 26 MMOL/L (ref 21–32)
COLOR UR AUTO: YELLOW
CREAT BLD-MCNC: 1.2 MG/DL
DEPRECATED RDW RBC AUTO: 42.8 FL (ref 35.1–46.3)
EOSINOPHIL # BLD AUTO: 0.18 X10(3) UL (ref 0–0.7)
EOSINOPHIL NFR BLD AUTO: 2.2 %
ERYTHROCYTE [DISTWIDTH] IN BLOOD BY AUTOMATED COUNT: 13.2 % (ref 11–15)
GLOBULIN PLAS-MCNC: 4 G/DL (ref 2.8–4.4)
GLUCOSE BLD-MCNC: 103 MG/DL (ref 70–99)
GLUCOSE UR STRIP.AUTO-MCNC: NEGATIVE MG/DL
HCT VFR BLD AUTO: 41.3 %
HGB BLD-MCNC: 13.9 G/DL
IMM GRANULOCYTES # BLD AUTO: 0.03 X10(3) UL (ref 0–1)
IMM GRANULOCYTES NFR BLD: 0.4 %
KETONES UR STRIP.AUTO-MCNC: NEGATIVE MG/DL
LYMPHOCYTES # BLD AUTO: 1.5 X10(3) UL (ref 1–4)
LYMPHOCYTES NFR BLD AUTO: 17.9 %
M PROTEIN MFR SERPL ELPH: 7.6 G/DL (ref 6.4–8.2)
MCH RBC QN AUTO: 30.2 PG (ref 26–34)
MCHC RBC AUTO-ENTMCNC: 33.7 G/DL (ref 31–37)
MCV RBC AUTO: 89.6 FL
MONOCYTES # BLD AUTO: 0.8 X10(3) UL (ref 0.1–1)
MONOCYTES NFR BLD AUTO: 9.6 %
NEUTROPHILS # BLD AUTO: 5.81 X10 (3) UL (ref 1.5–7.7)
NEUTROPHILS # BLD AUTO: 5.81 X10(3) UL (ref 1.5–7.7)
NEUTROPHILS NFR BLD AUTO: 69.4 %
NITRITE UR QL STRIP.AUTO: NEGATIVE
OSMOLALITY SERPL CALC.SUM OF ELEC: 286 MOSM/KG (ref 275–295)
PH UR STRIP.AUTO: 6 [PH] (ref 5–8)
PLATELET # BLD AUTO: 206 10(3)UL (ref 150–450)
POTASSIUM SERPL-SCNC: 4.5 MMOL/L (ref 3.5–5.1)
PROT UR STRIP.AUTO-MCNC: NEGATIVE MG/DL
RBC # BLD AUTO: 4.61 X10(6)UL
SODIUM SERPL-SCNC: 136 MMOL/L (ref 136–145)
SP GR UR STRIP.AUTO: 1.01 (ref 1–1.03)
UROBILINOGEN UR STRIP.AUTO-MCNC: <2 MG/DL
WBC # BLD AUTO: 8.4 X10(3) UL (ref 4–11)
WBC #/AREA URNS AUTO: >50 /HPF

## 2021-07-19 PROCEDURE — 73030 X-RAY EXAM OF SHOULDER: CPT | Performed by: EMERGENCY MEDICINE

## 2021-07-19 PROCEDURE — 99284 EMERGENCY DEPT VISIT MOD MDM: CPT

## 2021-07-19 PROCEDURE — A9575 INJ GADOTERATE MEGLUMI 0.1ML: HCPCS | Performed by: EMERGENCY MEDICINE

## 2021-07-19 PROCEDURE — 96365 THER/PROPH/DIAG IV INF INIT: CPT

## 2021-07-19 PROCEDURE — 70553 MRI BRAIN STEM W/O & W/DYE: CPT | Performed by: EMERGENCY MEDICINE

## 2021-07-19 PROCEDURE — 85025 COMPLETE CBC W/AUTO DIFF WBC: CPT | Performed by: EMERGENCY MEDICINE

## 2021-07-19 PROCEDURE — 80053 COMPREHEN METABOLIC PANEL: CPT | Performed by: EMERGENCY MEDICINE

## 2021-07-19 PROCEDURE — 99285 EMERGENCY DEPT VISIT HI MDM: CPT

## 2021-07-19 PROCEDURE — 70546 MR ANGIOGRAPH HEAD W/O&W/DYE: CPT | Performed by: EMERGENCY MEDICINE

## 2021-07-19 PROCEDURE — 81001 URINALYSIS AUTO W/SCOPE: CPT | Performed by: EMERGENCY MEDICINE

## 2021-07-19 PROCEDURE — 70549 MR ANGIOGRAPH NECK W/O&W/DYE: CPT | Performed by: EMERGENCY MEDICINE

## 2021-07-19 PROCEDURE — 87086 URINE CULTURE/COLONY COUNT: CPT | Performed by: EMERGENCY MEDICINE

## 2021-07-19 PROCEDURE — S0028 INJECTION, FAMOTIDINE, 20 MG: HCPCS | Performed by: EMERGENCY MEDICINE

## 2021-07-19 PROCEDURE — 96375 TX/PRO/DX INJ NEW DRUG ADDON: CPT

## 2021-07-19 RX ORDER — CEPHALEXIN 500 MG/1
500 CAPSULE ORAL 4 TIMES DAILY
Qty: 40 CAPSULE | Refills: 0 | Status: SHIPPED | OUTPATIENT
Start: 2021-07-19 | End: 2021-07-29

## 2021-07-19 RX ORDER — FAMOTIDINE 10 MG/ML
20 INJECTION, SOLUTION INTRAVENOUS ONCE
Status: COMPLETED | OUTPATIENT
Start: 2021-07-19 | End: 2021-07-19

## 2021-07-19 RX ORDER — DIPHENHYDRAMINE HYDROCHLORIDE 50 MG/ML
25 INJECTION INTRAMUSCULAR; INTRAVENOUS ONCE
Status: COMPLETED | OUTPATIENT
Start: 2021-07-19 | End: 2021-07-19

## 2021-07-19 NOTE — ED PROVIDER NOTES
Patient Seen in: [facility] Emergency Department      History   Patient presents with:  Arm or Hand Injury    Stated Complaint: Left shoulder pain after mechanical fall yesterday afternoon - denies LOC; brittny*    HPI/Subjective:   HPI    35-year-old 9/17/2010   • Kidney stone 8/19/2011   • Lipid screening 9/13/2010   • Lumbar epidural mass ([facility] 10/28/2019   • Lyme disease 11/14/2018   • Measles    • Mononucleosis    • MRSA infection 1997    L wrist fracture complicated my MRSA   • Mumps    • Nocturia Current:/81   Pulse 57   Temp 98.9 °F (37.2 °C) (Temporal)   Resp 18   Ht 180.3 cm (5' 11\")   Wt 108.9 kg   SpO2 96%   BMI 33.47 kg/m²         Physical Exam    Well-developed well-nourished male who is sitting on the gurney he is awake and herrera DIFFERENTIAL WITH PLATELET    Narrative: The following orders were created for panel order CBC With Differential With Platelet.   Procedure                               Abnormality         Status                     --------- MRA of the neck shows patency of the right and left vertebral arteries, the right and left common carotid, internal carotid and external carotid arteries and carotid bulb without sign of dissection, occlusion or acute thrombosis of these vessels.  The currently 10 x 7 mm, previously 6 x 4 mm.  The nodule is well-defined, likely    of benign etiology.       2.  MRA of the head and neck performed, demonstrating 50% stenosis of the midportion dominant right vertebral artery, and suspected stenosis at the or pm    Follow-up:  Cheli Navarro MD  [address] [place] [address]  [phone]    In 2 days            Medications Prescribed:  Discharge Medication List as of 7/19/2021  4:01 PM    START taking these medications    cephALEXin 500

## 2021-07-19 NOTE — PROGRESS NOTES
Pharmacy Note:   [employer] approved dose adjustment for: Rocephin    Robertmykel Moses has been prescribed Rocephin 1 g x 1 for the ER. Estimated Creatinine Clearance: 60.1 mL/min (based on SCr of 1.2 mg/dL). Body mass index is 33.47 kg/m².  Wt Readings

## 2021-07-19 NOTE — TELEPHONE ENCOUNTER
Patient asking for call back from nurse. Pt would like advise on swollen lip. Pt took zyrtec but not sure what else he should do for that. Also concerned he may have fractured his clavicle.
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
## 2021-07-19 NOTE — TELEPHONE ENCOUNTER
Patient fell yesterday and hit his left shoulder. Patient also complains about swollen lip not related to fall. Patient scheduled with [facility] for 7/20/21. Pt would like to speak with nurse.

## 2021-08-02 ENCOUNTER — TELEPHONE (OUTPATIENT)
Dept: INTERNAL MEDICINE CLINIC | Facility: CLINIC | Age: 71
End: 2021-08-02

## 2021-08-06 NOTE — TELEPHONE ENCOUNTER
Provider signed orders. [address] [place] [address] faxed, confirmation received and forms sent to scan.

## 2021-09-18 ENCOUNTER — TELEPHONE (OUTPATIENT)
Dept: INTERNAL MEDICINE CLINIC | Facility: CLINIC | Age: 71
End: 2021-09-18

## 2021-09-18 NOTE — TELEPHONE ENCOUNTER
Received fax from [facility], Dr. Tracey Fernandez,  for JL to complete and fax back for medical clearance for dental treatment. Unsure if pt needs a pre-op. Placing copy of fax in JL's folder and one in blue folder for . Pw doesn't indicate with dental procedure/surgery is. LM for pt to call back on 9/20 to discuss.

## 2021-09-20 NOTE — TELEPHONE ENCOUNTER
Lisa Edward called to confirm we received pre-op for pt. I advised we recieved fax.   regarding pt's clearance, I advised pt isn't scheduled yet

## 2021-09-24 NOTE — TELEPHONE ENCOUNTER
Future Appointments   Date Time Provider Jodie Jessica   9/29/2021  3:40 PM Cathie Longo MD [facility]

## 2021-09-24 NOTE — TELEPHONE ENCOUNTER
Pt cancelled his pre-op appt through [facility] and indicated he no longer needs the appt.

## 2021-10-01 ENCOUNTER — OFFICE VISIT (OUTPATIENT)
Dept: SURGERY | Facility: CLINIC | Age: 71
End: 2021-10-01
Payer: MEDICARE

## 2021-10-01 VITALS — DIASTOLIC BLOOD PRESSURE: 88 MMHG | SYSTOLIC BLOOD PRESSURE: 138 MMHG | HEART RATE: 76 BPM

## 2021-10-01 DIAGNOSIS — M51.16 LUMBAR DISC HERNIATION WITH RADICULOPATHY: Primary | ICD-10-CM

## 2021-10-01 DIAGNOSIS — R29.898 WEAKNESS OF RIGHT LEG: ICD-10-CM

## 2021-10-01 PROCEDURE — 99214 OFFICE O/P EST MOD 30 MIN: CPT | Performed by: PHYSICIAN ASSISTANT

## 2021-10-01 RX ORDER — AMOXICILLIN 875 MG/1
875 TABLET, COATED ORAL 2 TIMES DAILY
COMMUNITY
Start: 2021-09-24 | End: 2021-10-14 | Stop reason: ALTCHOICE

## 2021-10-01 RX ORDER — HYDROCODONE BITARTRATE AND ACETAMINOPHEN 10; 325 MG/1; MG/1
1 TABLET ORAL EVERY 4 HOURS PRN
Qty: 60 TABLET | Refills: 0 | Status: SHIPPED | OUTPATIENT
Start: 2021-10-01 | End: 2021-11-02

## 2021-10-01 RX ORDER — PREDNISONE 10 MG/1
10 TABLET ORAL DAILY
Qty: 30 TABLET | Refills: 0 | Status: SHIPPED | OUTPATIENT
Start: 2021-10-01 | End: 2021-10-14 | Stop reason: ALTCHOICE

## 2021-10-01 NOTE — PROGRESS NOTES
Patient here for a follow up visit, reporting pain over right sacrum, weakness in right quads and weakness in right calf. Pain migrates to various locations. Patient completed medrol dosepak 3 days ago.   Patient felt some relief from steroid and is now ta

## 2021-10-01 NOTE — PATIENT INSTRUCTIONS
PLAN:  Prednisone taper  Norco for pain  MRI of the lumbar spine with without contrast  Follow-up after imaging

## 2021-10-01 NOTE — PROGRESS NOTES
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     Status post left L4-5 L5-S1 foraminotomies and facet cyst resection 10/28/2019 Dr. Matheus Fernandez    After his lisbet synovial cyst. 10/1/19 Dr Demetria Cleary. 0% of relief in leg pain        PAST MEDICAL HISTORY:  Past Medical History:   Diagnosis Date   • Actinic keratosis 9/17/2010   • Babesiosis 11/14/2018   • Back problem    • Blood loss anemia 11/4/2018   • BPH (benign prosta microforaminotomies and facet cyst resection   • OTHER SURGICAL HISTORY  12/26/2017, 2019    zheng w/ Dr. Hazel Almonte:  family history includes Cancer in his brother; Diabetes in his mother; Glaucoma in his father and another family member MD SHAYLA        REVIEW OF SYSTEMS:  A 10-point system was reviewed. Pertinent positives and negatives are noted in HPI. PHYSICAL EXAMINATION:  GENERAL:  Patient is in no acute distress. HEENT:  Normocephalic, atraumatic  SKIN: Warm, dry, no rashes. the left hip 9/11/2019 mild left hip osteoarthritis      ASSESSMENT:  -Status post left L4-5 L5-S1 foraminotomies and facet cyst resection 10/28/2019 Dr. Matheus Fernandez  -Cervical C3-7 stenosis   -Right lumbar radiculopathy  -Right leg weakness    PLAN:  Prednisone

## 2021-10-02 ENCOUNTER — HOSPITAL ENCOUNTER (OUTPATIENT)
Dept: MRI IMAGING | Age: 71
Discharge: HOME | End: 2021-10-02
Attending: PHYSICIAN ASSISTANT
Payer: MEDICARE

## 2021-10-02 DIAGNOSIS — R29.898 WEAKNESS OF RIGHT LEG: ICD-10-CM

## 2021-10-02 DIAGNOSIS — M51.16 LUMBAR DISC HERNIATION WITH RADICULOPATHY: ICD-10-CM

## 2021-10-02 PROCEDURE — 72158 MRI LUMBAR SPINE W/O & W/DYE: CPT | Performed by: PHYSICIAN ASSISTANT

## 2021-10-02 PROCEDURE — A9575 INJ GADOTERATE MEGLUMI 0.1ML: HCPCS | Performed by: PHYSICIAN ASSISTANT

## 2021-10-02 PROCEDURE — 82565 ASSAY OF CREATININE: CPT

## 2021-10-05 ENCOUNTER — TELEPHONE (OUTPATIENT)
Dept: SURGERY | Facility: CLINIC | Age: 71
End: 2021-10-05

## 2021-10-05 NOTE — TELEPHONE ENCOUNTER
Spoke with [facility] pharmacist who was inquiring about high dose for Norco and if pain was acute or chronic. Informed dose is correct and pain is acute.  Pharmacist states they can only release a 7 day supply for first time fills on narcotics so patient will

## 2021-10-14 ENCOUNTER — OFFICE VISIT (OUTPATIENT)
Dept: SURGERY | Facility: CLINIC | Age: 71
End: 2021-10-14
Payer: MEDICARE

## 2021-10-14 ENCOUNTER — TELEPHONE (OUTPATIENT)
Dept: SURGERY | Facility: CLINIC | Age: 71
End: 2021-10-14

## 2021-10-14 ENCOUNTER — TELEPHONE (OUTPATIENT)
Dept: INTERNAL MEDICINE CLINIC | Facility: CLINIC | Age: 71
End: 2021-10-14

## 2021-10-14 VITALS
WEIGHT: 240 LBS | HEART RATE: 72 BPM | RESPIRATION RATE: 18 BRPM | OXYGEN SATURATION: 95 % | BODY MASS INDEX: 33 KG/M2 | SYSTOLIC BLOOD PRESSURE: 100 MMHG | DIASTOLIC BLOOD PRESSURE: 58 MMHG

## 2021-10-14 DIAGNOSIS — R29.898 WEAKNESS OF RIGHT LEG: ICD-10-CM

## 2021-10-14 DIAGNOSIS — M51.16 LUMBAR DISC HERNIATION WITH RADICULOPATHY: Primary | ICD-10-CM

## 2021-10-14 PROCEDURE — 99214 OFFICE O/P EST MOD 30 MIN: CPT | Performed by: PHYSICIAN ASSISTANT

## 2021-10-14 NOTE — TELEPHONE ENCOUNTER
Patient is scheduled for right L3-L4 microdiscectomy. Right L4-L5, L5-S! microforaminotomies on 11/3/21 with Dr Porfirio Man.     Y  form completed  Y Surgery order signed   Lisa Osgood on surgery sheet  Y Placed on outlook calendar  Y [employer] message sent

## 2021-10-14 NOTE — TELEPHONE ENCOUNTER
You are scheduled for RIGHT LUMBAR 3-LUMBAR 4 MICRODISCECTOMY. RIGHT LUMBAR 4- LUMBAR 5 AND LUMBAR 5 - SACRAL 1 MICROFORAMINOTOMIES on 11/3/21 with     · PCP clearance is needed.   We have faxed a request for pre-op clearance to you PCP Dr. Rachana Thao detailed instructions below. · Our office will get prior authorization for surgery through your insurance. ·  Surgery is usually scheduled as 1 day admission. · The hospital will contact you 1-2 days before surgery with your arrival time.      · meet by the fish tank in the [facility] for your escort to class on the Ortho Spine unit. If unable to attend, class is available online at [email or web address].  Please call the Care Coordinator, Razia Cohen, with any questions, at [phone]

## 2021-10-14 NOTE — PROGRESS NOTES
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   Patient took the prednisone taper he said he did very well for 3 days now that the symptoms seem to be comi pain numbness tingling weakness in the arms. He has lower back pain to the right which is a 4-5 over 10 is worse with changing positions laying down is better with sitting. He complains of right L5 radiculopathy and weakness in his right quad.   He has nu allergies   • Sleep apnea     CPAP   • Thrombocytopenia (HCC) 11/4/2018   • Tinnitus 9/17/2010    low grade   • Urinary urgency 9/17/2010    mild   • Varicose veins 9/17/2010    and venous insufficiency   • Venous insufficiency 9/17/2010   • Wrist fracture Coenzyme Q10 (COQ-10 OR), Take 1 capsule by mouth daily. , Disp: , Rfl:   Omega-3 Fatty Acids (FISH OIL OR), Take 1 capsule by mouth daily. , Disp: , Rfl:   Cholecalciferol (VITAMIN D OR), Take 1 capsule by mouth daily.   , Disp: , Rfl:   psyllium (METAMU 1+           Left      1+           1+               1+       1+              IMAGING:  MRI CS spine 9/27/19 C3-4 anterior cord deformity.  C4-5-6 severe stenosis, C6-7 spondylosis stenosis    MRI of the lumbar spine 9/11/2019 show scoliosis with L4-5 L5-S1

## 2021-10-14 NOTE — PATIENT INSTRUCTIONS
Refill policies:    • Allow 2-3 business days for refills; controlled substances may take longer.   • Contact your pharmacy at least 5 days prior to running out of medication and have them send an electronic request or submit request through the “request re Depending on your insurance carrier, approval may take 3-10 days. It is highly recommended patients contact their insurance carrier directly to determine coverage.   If test is done without insurance authorization, patient may be responsible for the entire SACRAL 1 MICROFORAMINOTOMIES on 11/3/21 with     · PCP clearance is needed. We have faxed a request for pre-op clearance to you PCP Dr. María Elena Roper. Please contact their office for appointment.     · You will need  pre operative labs which will inclu insurance. ·  Surgery is usually scheduled as 1 day admission. · The hospital will contact you 1-2 days before surgery with your arrival time.      · If you were on blood thinners (such as Coumadin, Pradaxa, Xarelto, etc) prior to surgery that we had to attend, class is available online at [email or web address].  Please call the Care Coordinator, Natali Bautista, with any questions, at [phone]    If you have any questions or concerns please contact our office at [phone] #[phone] or via [address]

## 2021-10-15 ENCOUNTER — EKG ENCOUNTER (OUTPATIENT)
Dept: LAB | Facility: HOSPITAL | Age: 71
End: 2021-10-15
Attending: NEUROLOGICAL SURGERY
Payer: MEDICARE

## 2021-10-15 ENCOUNTER — HOSPITAL ENCOUNTER (OUTPATIENT)
Dept: GENERAL RADIOLOGY | Facility: HOSPITAL | Age: 71
Discharge: HOME | End: 2021-10-15
Attending: NEUROLOGICAL SURGERY
Payer: MEDICARE

## 2021-10-15 DIAGNOSIS — M51.16 LUMBAR DISC HERNIATION WITH RADICULOPATHY: ICD-10-CM

## 2021-10-15 PROCEDURE — 85730 THROMBOPLASTIN TIME PARTIAL: CPT

## 2021-10-15 PROCEDURE — 93010 ELECTROCARDIOGRAM REPORT: CPT | Performed by: INTERNAL MEDICINE

## 2021-10-15 PROCEDURE — 71046 X-RAY EXAM CHEST 2 VIEWS: CPT | Performed by: NEUROLOGICAL SURGERY

## 2021-10-15 PROCEDURE — 85610 PROTHROMBIN TIME: CPT

## 2021-10-15 PROCEDURE — 80053 COMPREHEN METABOLIC PANEL: CPT

## 2021-10-15 PROCEDURE — 87081 CULTURE SCREEN ONLY: CPT

## 2021-10-15 PROCEDURE — 36415 COLL VENOUS BLD VENIPUNCTURE: CPT

## 2021-10-15 PROCEDURE — 85025 COMPLETE CBC W/AUTO DIFF WBC: CPT

## 2021-10-15 PROCEDURE — 93005 ELECTROCARDIOGRAM TRACING: CPT

## 2021-10-19 ENCOUNTER — TELEPHONE (OUTPATIENT)
Dept: SURGERY | Facility: CLINIC | Age: 71
End: 2021-10-19

## 2021-10-19 ENCOUNTER — OFFICE VISIT (OUTPATIENT)
Dept: INTERNAL MEDICINE CLINIC | Facility: CLINIC | Age: 71
End: 2021-10-19
Payer: MEDICARE

## 2021-10-19 VITALS
OXYGEN SATURATION: 94 % | BODY MASS INDEX: 35.93 KG/M2 | SYSTOLIC BLOOD PRESSURE: 126 MMHG | HEIGHT: 70.08 IN | WEIGHT: 251 LBS | RESPIRATION RATE: 18 BRPM | DIASTOLIC BLOOD PRESSURE: 64 MMHG | TEMPERATURE: 97 F | HEART RATE: 62 BPM

## 2021-10-19 DIAGNOSIS — Z23 NEED FOR VACCINATION: ICD-10-CM

## 2021-10-19 DIAGNOSIS — D49.59 URETERAL NEOPLASM: ICD-10-CM

## 2021-10-19 DIAGNOSIS — M51.16 LUMBAR DISC HERNIATION WITH RADICULOPATHY: ICD-10-CM

## 2021-10-19 DIAGNOSIS — E78.5 DYSLIPIDEMIA: ICD-10-CM

## 2021-10-19 DIAGNOSIS — R73.01 ELEVATED FASTING GLUCOSE: ICD-10-CM

## 2021-10-19 DIAGNOSIS — Z01.818 PRE-OPERATIVE EXAM: Primary | ICD-10-CM

## 2021-10-19 PROCEDURE — 90732 PPSV23 VACC 2 YRS+ SUBQ/IM: CPT | Performed by: FAMILY MEDICINE

## 2021-10-19 PROCEDURE — G0008 ADMIN INFLUENZA VIRUS VAC: HCPCS | Performed by: FAMILY MEDICINE

## 2021-10-19 PROCEDURE — 99214 OFFICE O/P EST MOD 30 MIN: CPT | Performed by: FAMILY MEDICINE

## 2021-10-19 PROCEDURE — 90662 IIV NO PRSV INCREASED AG IM: CPT | Performed by: FAMILY MEDICINE

## 2021-10-19 PROCEDURE — G0009 ADMIN PNEUMOCOCCAL VACCINE: HCPCS | Performed by: FAMILY MEDICINE

## 2021-10-19 RX ORDER — MULTIVIT-MIN/IRON FUM/FOLIC AC 7.5 MG-4
1 TABLET ORAL DAILY
COMMUNITY

## 2021-10-19 NOTE — PROGRESS NOTES
Haseeb Jane  1/3/1950    Patient presents with:  Pre-Op Exam: RG rm 9 Pre-op RIGHT LUMBAR 3-LUMBAR 4 MICRODISCECTOMY.  RIGHT LUMBAR 4- LUMBAR 5 AND LUMBAR 5 - SACRAL 1 MICROFORAMINOTOMIES 11/3/21 Dr. Nori Howell      HPI:   Haseeb Jane is a 70 Calculus of kidney    • Cancer [facility])    • Cataract    • Chickenpox    • CRP elevated 2009   • Dizziness and giddiness 8/19/2011    and lightheaded   • Environmental allergies 12/28/2015    Positive I CAP test.  Grasses trees dust doing well on daily Zyrtec unspecified     Flank pain     Pyelonephritis of right kidney     Urinary retention     Past Surgical History:   Procedure Laterality Date   • CATARACT  11/2012   • CYSTOSCOPY,DIL Neha Byrnes  05/2019   • CYSTOSCOPY,INSERT URETERAL STENT      with without murmur  GI: good BS's,no masses, HSM or tenderness    ASSESSMENT AND PLAN:   Danny Clinton is a 70year old male who presents for pre-operative exam prior to his lumbar surgery    1.  Pre-operative exam  Reviewed stable pre-operative labs, n

## 2021-10-19 NOTE — TELEPHONE ENCOUNTER
Received Pre-Op Exam OV Note DOS 10/19/21 from Dr. Dima Veras office. Forwarded to Provider for review.

## 2021-10-19 NOTE — TELEPHONE ENCOUNTER
Per PCP on 10-19-21 \"Pre-operative exam  Reviewed stable pre-operative labs, normal CXR, and EKG done on 10/15 that shows Sinus bradycardia with rate of 59 and no ischemic changes.  He has food functional capacity and no exertional chest pain or shortness

## 2021-10-26 NOTE — TELEPHONE ENCOUNTER
Received called from [address] [place] [address] who states they do not have availability for this case on 10/27/21. Consulted with Dr. Suma Ring and was given 11/1/21 to be first case. Called pt and informed.  Pt accepting of this and will call PAT to reschedule COVID test.

## 2021-10-26 NOTE — TELEPHONE ENCOUNTER
Per Dr. Suma Ring offer sx date 10/27/21    Called pt who is accepting of sx date change and informed that a Rapid COVID test will be performed on morning of sx.        Pt's sx date and time have changed to 10/27/21 at 0703    y-Place case change request, reque

## 2021-10-29 ENCOUNTER — LAB ENCOUNTER (OUTPATIENT)
Dept: LAB | Facility: HOSPITAL | Age: 71
DRG: 520 | End: 2021-10-29
Payer: MEDICARE

## 2021-10-29 DIAGNOSIS — M51.16 LUMBAR DISC HERNIATION WITH RADICULOPATHY: ICD-10-CM

## 2021-11-01 ENCOUNTER — ANESTHESIA (OUTPATIENT)
Dept: SURGERY | Facility: HOSPITAL | Age: 71
DRG: 520 | End: 2021-11-01
Payer: MEDICARE

## 2021-11-01 ENCOUNTER — APPOINTMENT (OUTPATIENT)
Dept: GENERAL RADIOLOGY | Facility: HOSPITAL | Age: 71
DRG: 520 | End: 2021-11-01
Attending: NEUROLOGICAL SURGERY
Payer: MEDICARE

## 2021-11-01 ENCOUNTER — HOSPITAL ENCOUNTER (INPATIENT)
Facility: HOSPITAL | Age: 71
LOS: 1 days | Discharge: HOME | DRG: 520 | End: 2021-11-02
Attending: NEUROLOGICAL SURGERY | Admitting: NEUROLOGICAL SURGERY
Payer: MEDICARE

## 2021-11-01 ENCOUNTER — ANESTHESIA EVENT (OUTPATIENT)
Dept: SURGERY | Facility: HOSPITAL | Age: 71
DRG: 520 | End: 2021-11-01
Payer: MEDICARE

## 2021-11-01 DIAGNOSIS — M51.16 LUMBAR DISC HERNIATION WITH RADICULOPATHY: Primary | ICD-10-CM

## 2021-11-01 DIAGNOSIS — R29.898 WEAKNESS OF RIGHT LEG: ICD-10-CM

## 2021-11-01 PROCEDURE — 01NB0ZZ RELEASE LUMBAR NERVE, OPEN APPROACH: ICD-10-PCS | Performed by: NEUROLOGICAL SURGERY

## 2021-11-01 PROCEDURE — 76942 ECHO GUIDE FOR BIOPSY: CPT | Performed by: INTERNAL MEDICINE

## 2021-11-01 PROCEDURE — 99222 1ST HOSP IP/OBS MODERATE 55: CPT | Performed by: HOSPITALIST

## 2021-11-01 PROCEDURE — 0SB20ZZ EXCISION OF LUMBAR VERTEBRAL DISC, OPEN APPROACH: ICD-10-PCS | Performed by: NEUROLOGICAL SURGERY

## 2021-11-01 PROCEDURE — 72020 X-RAY EXAM OF SPINE 1 VIEW: CPT | Performed by: NEUROLOGICAL SURGERY

## 2021-11-01 RX ORDER — SODIUM CHLORIDE, SODIUM LACTATE, POTASSIUM CHLORIDE, CALCIUM CHLORIDE 600; 310; 30; 20 MG/100ML; MG/100ML; MG/100ML; MG/100ML
INJECTION, SOLUTION INTRAVENOUS CONTINUOUS
Status: DISCONTINUED | OUTPATIENT
Start: 2021-11-01 | End: 2021-11-01 | Stop reason: HOSPADM

## 2021-11-01 RX ORDER — HYDROMORPHONE HYDROCHLORIDE 1 MG/ML
0.4 INJECTION, SOLUTION INTRAMUSCULAR; INTRAVENOUS; SUBCUTANEOUS EVERY 5 MIN PRN
Status: DISCONTINUED | OUTPATIENT
Start: 2021-11-01 | End: 2021-11-01 | Stop reason: HOSPADM

## 2021-11-01 RX ORDER — MEPERIDINE HYDROCHLORIDE 25 MG/ML
12.5 INJECTION INTRAMUSCULAR; INTRAVENOUS; SUBCUTANEOUS AS NEEDED
Status: DISCONTINUED | OUTPATIENT
Start: 2021-11-01 | End: 2021-11-01 | Stop reason: HOSPADM

## 2021-11-01 RX ORDER — VANCOMYCIN HYDROCHLORIDE 1 G/20ML
INJECTION, POWDER, LYOPHILIZED, FOR SOLUTION INTRAVENOUS AS NEEDED
Status: DISCONTINUED | OUTPATIENT
Start: 2021-11-01 | End: 2021-11-01 | Stop reason: HOSPADM

## 2021-11-01 RX ORDER — NALOXONE HYDROCHLORIDE 0.4 MG/ML
80 INJECTION, SOLUTION INTRAMUSCULAR; INTRAVENOUS; SUBCUTANEOUS AS NEEDED
Status: DISCONTINUED | OUTPATIENT
Start: 2021-11-01 | End: 2021-11-01 | Stop reason: HOSPADM

## 2021-11-01 RX ORDER — HYDROCODONE BITARTRATE AND ACETAMINOPHEN 5; 325 MG/1; MG/1
2 TABLET ORAL AS NEEDED
Status: DISCONTINUED | OUTPATIENT
Start: 2021-11-01 | End: 2021-11-01 | Stop reason: HOSPADM

## 2021-11-01 RX ORDER — HYDROMORPHONE HYDROCHLORIDE 1 MG/ML
0.4 INJECTION, SOLUTION INTRAMUSCULAR; INTRAVENOUS; SUBCUTANEOUS EVERY 2 HOUR PRN
Status: DISCONTINUED | OUTPATIENT
Start: 2021-11-01 | End: 2021-11-02

## 2021-11-01 RX ORDER — SODIUM CHLORIDE, SODIUM LACTATE, POTASSIUM CHLORIDE, CALCIUM CHLORIDE 600; 310; 30; 20 MG/100ML; MG/100ML; MG/100ML; MG/100ML
INJECTION, SOLUTION INTRAVENOUS CONTINUOUS
Status: DISCONTINUED | OUTPATIENT
Start: 2021-11-01 | End: 2021-11-01

## 2021-11-01 RX ORDER — SODIUM PHOSPHATE, DIBASIC AND SODIUM PHOSPHATE, MONOBASIC 7; 19 G/133ML; G/133ML
1 ENEMA RECTAL ONCE AS NEEDED
Status: DISCONTINUED | OUTPATIENT
Start: 2021-11-01 | End: 2021-11-02

## 2021-11-01 RX ORDER — LIDOCAINE HYDROCHLORIDE 10 MG/ML
INJECTION, SOLUTION EPIDURAL; INFILTRATION; INTRACAUDAL; PERINEURAL AS NEEDED
Status: DISCONTINUED | OUTPATIENT
Start: 2021-11-01 | End: 2021-11-01 | Stop reason: SURG

## 2021-11-01 RX ORDER — CEFAZOLIN SODIUM/WATER 2 G/20 ML
2 SYRINGE (ML) INTRAVENOUS EVERY 8 HOURS
Status: COMPLETED | OUTPATIENT
Start: 2021-11-01 | End: 2021-11-02

## 2021-11-01 RX ORDER — HYDROCODONE BITARTRATE AND ACETAMINOPHEN 10; 325 MG/1; MG/1
2 TABLET ORAL EVERY 4 HOURS PRN
Status: DISCONTINUED | OUTPATIENT
Start: 2021-11-01 | End: 2021-11-02

## 2021-11-01 RX ORDER — DEXAMETHASONE SODIUM PHOSPHATE 4 MG/ML
VIAL (ML) INJECTION AS NEEDED
Status: DISCONTINUED | OUTPATIENT
Start: 2021-11-01 | End: 2021-11-01 | Stop reason: SURG

## 2021-11-01 RX ORDER — CEFAZOLIN SODIUM 1 G/3ML
INJECTION, POWDER, FOR SOLUTION INTRAMUSCULAR; INTRAVENOUS AS NEEDED
Status: DISCONTINUED | OUTPATIENT
Start: 2021-11-01 | End: 2021-11-01 | Stop reason: SURG

## 2021-11-01 RX ORDER — ROCURONIUM BROMIDE 10 MG/ML
INJECTION, SOLUTION INTRAVENOUS AS NEEDED
Status: DISCONTINUED | OUTPATIENT
Start: 2021-11-01 | End: 2021-11-01 | Stop reason: SURG

## 2021-11-01 RX ORDER — CEFAZOLIN SODIUM/WATER 2 G/20 ML
SYRINGE (ML) INTRAVENOUS
Status: DISPENSED
Start: 2021-11-01 | End: 2021-11-01

## 2021-11-01 RX ORDER — ONDANSETRON 2 MG/ML
4 INJECTION INTRAMUSCULAR; INTRAVENOUS AS NEEDED
Status: DISCONTINUED | OUTPATIENT
Start: 2021-11-01 | End: 2021-11-01 | Stop reason: HOSPADM

## 2021-11-01 RX ORDER — EPHEDRINE SULFATE 50 MG/ML
INJECTION INTRAVENOUS AS NEEDED
Status: DISCONTINUED | OUTPATIENT
Start: 2021-11-01 | End: 2021-11-01 | Stop reason: SURG

## 2021-11-01 RX ORDER — PROCHLORPERAZINE EDISYLATE 5 MG/ML
10 INJECTION INTRAMUSCULAR; INTRAVENOUS EVERY 6 HOURS PRN
Status: DISCONTINUED | OUTPATIENT
Start: 2021-11-01 | End: 2021-11-02

## 2021-11-01 RX ORDER — MIDAZOLAM HYDROCHLORIDE 1 MG/ML
1 INJECTION INTRAMUSCULAR; INTRAVENOUS EVERY 5 MIN PRN
Status: DISCONTINUED | OUTPATIENT
Start: 2021-11-01 | End: 2021-11-01 | Stop reason: HOSPADM

## 2021-11-01 RX ORDER — POLYETHYLENE GLYCOL 3350 17 G/17G
17 POWDER, FOR SOLUTION ORAL DAILY PRN
Status: DISCONTINUED | OUTPATIENT
Start: 2021-11-01 | End: 2021-11-02

## 2021-11-01 RX ORDER — ACETAMINOPHEN 500 MG
1000 TABLET ORAL ONCE
Status: DISCONTINUED | OUTPATIENT
Start: 2021-11-01 | End: 2021-11-02

## 2021-11-01 RX ORDER — SODIUM CHLORIDE 9 MG/ML
INJECTION, SOLUTION INTRAVENOUS CONTINUOUS PRN
Status: DISCONTINUED | OUTPATIENT
Start: 2021-11-01 | End: 2021-11-01 | Stop reason: SURG

## 2021-11-01 RX ORDER — TRAMADOL HYDROCHLORIDE 50 MG/1
100 TABLET ORAL EVERY 6 HOURS PRN
Status: DISCONTINUED | OUTPATIENT
Start: 2021-11-01 | End: 2021-11-02

## 2021-11-01 RX ORDER — HYDROCODONE BITARTRATE AND ACETAMINOPHEN 10; 325 MG/1; MG/1
1 TABLET ORAL EVERY 4 HOURS PRN
Status: DISCONTINUED | OUTPATIENT
Start: 2021-11-01 | End: 2021-11-02

## 2021-11-01 RX ORDER — ACETAMINOPHEN 325 MG/1
650 TABLET ORAL EVERY 4 HOURS PRN
Status: DISCONTINUED | OUTPATIENT
Start: 2021-11-01 | End: 2021-11-02

## 2021-11-01 RX ORDER — DIPHENHYDRAMINE HYDROCHLORIDE 50 MG/ML
12.5 INJECTION INTRAMUSCULAR; INTRAVENOUS AS NEEDED
Status: DISCONTINUED | OUTPATIENT
Start: 2021-11-01 | End: 2021-11-01 | Stop reason: HOSPADM

## 2021-11-01 RX ORDER — SENNOSIDES 8.6 MG
17.2 TABLET ORAL NIGHTLY
Status: DISCONTINUED | OUTPATIENT
Start: 2021-11-01 | End: 2021-11-02

## 2021-11-01 RX ORDER — GLYCOPYRROLATE 0.2 MG/ML
INJECTION, SOLUTION INTRAMUSCULAR; INTRAVENOUS AS NEEDED
Status: DISCONTINUED | OUTPATIENT
Start: 2021-11-01 | End: 2021-11-01 | Stop reason: SURG

## 2021-11-01 RX ORDER — HYDROMORPHONE HYDROCHLORIDE 1 MG/ML
0.2 INJECTION, SOLUTION INTRAMUSCULAR; INTRAVENOUS; SUBCUTANEOUS EVERY 2 HOUR PRN
Status: DISCONTINUED | OUTPATIENT
Start: 2021-11-01 | End: 2021-11-02

## 2021-11-01 RX ORDER — DOCUSATE SODIUM 100 MG/1
100 CAPSULE, LIQUID FILLED ORAL 2 TIMES DAILY
Status: DISCONTINUED | OUTPATIENT
Start: 2021-11-01 | End: 2021-11-02

## 2021-11-01 RX ORDER — CEFAZOLIN SODIUM/WATER 2 G/20 ML
2 SYRINGE (ML) INTRAVENOUS ONCE
Status: COMPLETED | OUTPATIENT
Start: 2021-11-01 | End: 2021-11-01

## 2021-11-01 RX ORDER — DIAZEPAM 5 MG/1
5 TABLET ORAL EVERY 8 HOURS PRN
Status: DISCONTINUED | OUTPATIENT
Start: 2021-11-01 | End: 2021-11-02

## 2021-11-01 RX ORDER — HYDROCODONE BITARTRATE AND ACETAMINOPHEN 5; 325 MG/1; MG/1
1 TABLET ORAL AS NEEDED
Status: DISCONTINUED | OUTPATIENT
Start: 2021-11-01 | End: 2021-11-01 | Stop reason: HOSPADM

## 2021-11-01 RX ORDER — CETIRIZINE HYDROCHLORIDE 10 MG/1
10 TABLET ORAL
Status: DISCONTINUED | OUTPATIENT
Start: 2021-11-01 | End: 2021-11-02

## 2021-11-01 RX ORDER — ROSUVASTATIN CALCIUM 20 MG/1
20 TABLET, COATED ORAL NIGHTLY
Status: DISCONTINUED | OUTPATIENT
Start: 2021-11-02 | End: 2021-11-02

## 2021-11-01 RX ORDER — DIPHENHYDRAMINE HCL 25 MG
25 CAPSULE ORAL EVERY 4 HOURS PRN
Status: DISCONTINUED | OUTPATIENT
Start: 2021-11-01 | End: 2021-11-02

## 2021-11-01 RX ORDER — DIPHENHYDRAMINE HYDROCHLORIDE 50 MG/ML
25 INJECTION INTRAMUSCULAR; INTRAVENOUS EVERY 4 HOURS PRN
Status: DISCONTINUED | OUTPATIENT
Start: 2021-11-01 | End: 2021-11-02

## 2021-11-01 RX ORDER — SODIUM CHLORIDE 9 MG/ML
INJECTION, SOLUTION INTRAVENOUS CONTINUOUS
Status: DISCONTINUED | OUTPATIENT
Start: 2021-11-01 | End: 2021-11-02

## 2021-11-01 RX ORDER — SODIUM CHLORIDE 0.9 % (FLUSH) 0.9 %
SYRINGE (ML) INJECTION AS NEEDED
Status: DISCONTINUED | OUTPATIENT
Start: 2021-11-01 | End: 2021-11-01 | Stop reason: HOSPADM

## 2021-11-01 RX ORDER — BISACODYL 10 MG
10 SUPPOSITORY, RECTAL RECTAL
Status: DISCONTINUED | OUTPATIENT
Start: 2021-11-01 | End: 2021-11-02

## 2021-11-01 RX ORDER — BUPIVACAINE HYDROCHLORIDE AND EPINEPHRINE 5; 5 MG/ML; UG/ML
INJECTION, SOLUTION EPIDURAL; INTRACAUDAL; PERINEURAL AS NEEDED
Status: DISCONTINUED | OUTPATIENT
Start: 2021-11-01 | End: 2021-11-01 | Stop reason: HOSPADM

## 2021-11-01 RX ORDER — ACETAMINOPHEN 500 MG
1000 TABLET ORAL EVERY 6 HOURS PRN
COMMUNITY

## 2021-11-01 RX ORDER — HYDROMORPHONE HYDROCHLORIDE 1 MG/ML
0.8 INJECTION, SOLUTION INTRAMUSCULAR; INTRAVENOUS; SUBCUTANEOUS EVERY 2 HOUR PRN
Status: DISCONTINUED | OUTPATIENT
Start: 2021-11-01 | End: 2021-11-02

## 2021-11-01 RX ORDER — CYCLOBENZAPRINE HCL 10 MG
10 TABLET ORAL 3 TIMES DAILY PRN
Status: DISCONTINUED | OUTPATIENT
Start: 2021-11-01 | End: 2021-11-02

## 2021-11-01 RX ORDER — ALBUTEROL SULFATE 2.5 MG/3ML
2.5 SOLUTION RESPIRATORY (INHALATION) AS NEEDED
Status: DISCONTINUED | OUTPATIENT
Start: 2021-11-01 | End: 2021-11-01 | Stop reason: HOSPADM

## 2021-11-01 RX ORDER — HYDROMORPHONE HYDROCHLORIDE 1 MG/ML
INJECTION, SOLUTION INTRAMUSCULAR; INTRAVENOUS; SUBCUTANEOUS
Status: COMPLETED
Start: 2021-11-01 | End: 2021-11-01

## 2021-11-01 RX ORDER — TRAMADOL HYDROCHLORIDE 50 MG/1
50 TABLET ORAL EVERY 6 HOURS PRN
Status: DISCONTINUED | OUTPATIENT
Start: 2021-11-01 | End: 2021-11-02

## 2021-11-01 RX ORDER — ONDANSETRON 2 MG/ML
4 INJECTION INTRAMUSCULAR; INTRAVENOUS EVERY 4 HOURS PRN
Status: DISCONTINUED | OUTPATIENT
Start: 2021-11-01 | End: 2021-11-02

## 2021-11-01 RX ADMIN — DEXAMETHASONE SODIUM PHOSPHATE 4 MG: 4 MG/ML VIAL (ML) INJECTION at 08:10:00

## 2021-11-01 RX ADMIN — CEFAZOLIN SODIUM 2 G: 1 INJECTION, POWDER, FOR SOLUTION INTRAMUSCULAR; INTRAVENOUS at 12:13:00

## 2021-11-01 RX ADMIN — ROCURONIUM BROMIDE 20 MG: 10 INJECTION, SOLUTION INTRAVENOUS at 10:44:00

## 2021-11-01 RX ADMIN — CEFAZOLIN SODIUM/WATER 2 G: 2 G/20 ML SYRINGE (ML) INTRAVENOUS at 08:15:00

## 2021-11-01 RX ADMIN — ROCURONIUM BROMIDE 80 MG: 10 INJECTION, SOLUTION INTRAVENOUS at 08:03:00

## 2021-11-01 RX ADMIN — SODIUM CHLORIDE, SODIUM LACTATE, POTASSIUM CHLORIDE, CALCIUM CHLORIDE: 600; 310; 30; 20 INJECTION, SOLUTION INTRAVENOUS at 12:14:00

## 2021-11-01 RX ADMIN — ROCURONIUM BROMIDE 20 MG: 10 INJECTION, SOLUTION INTRAVENOUS at 10:05:00

## 2021-11-01 RX ADMIN — LIDOCAINE HYDROCHLORIDE 50 MG: 10 INJECTION, SOLUTION EPIDURAL; INFILTRATION; INTRACAUDAL; PERINEURAL at 08:01:00

## 2021-11-01 RX ADMIN — EPHEDRINE SULFATE 5 MG: 50 INJECTION INTRAVENOUS at 08:58:00

## 2021-11-01 RX ADMIN — SODIUM CHLORIDE: 9 INJECTION, SOLUTION INTRAVENOUS at 08:00:00

## 2021-11-01 RX ADMIN — GLYCOPYRROLATE 0.2 MG: 0.2 INJECTION, SOLUTION INTRAMUSCULAR; INTRAVENOUS at 09:07:00

## 2021-11-01 RX ADMIN — ROCURONIUM BROMIDE 10 MG: 10 INJECTION, SOLUTION INTRAVENOUS at 12:13:00

## 2021-11-01 RX ADMIN — EPHEDRINE SULFATE 5 MG: 50 INJECTION INTRAVENOUS at 13:03:00

## 2021-11-01 RX ADMIN — SODIUM CHLORIDE, SODIUM LACTATE, POTASSIUM CHLORIDE, CALCIUM CHLORIDE: 600; 310; 30; 20 INJECTION, SOLUTION INTRAVENOUS at 08:00:00

## 2021-11-01 RX ADMIN — ROCURONIUM BROMIDE 20 MG: 10 INJECTION, SOLUTION INTRAVENOUS at 08:45:00

## 2021-11-01 RX ADMIN — ROCURONIUM BROMIDE 20 MG: 10 INJECTION, SOLUTION INTRAVENOUS at 11:16:00

## 2021-11-01 NOTE — ANESTHESIA PROCEDURE NOTES
Arterial Line  Performed by: Michael Hill MD  Authorized by: Michael Hill MD     General Information and Staff    Procedure Start:  11/1/2021 8:08 AM  Procedure End:  11/1/2021 8:10 AM  Anesthesiologist:  Michael Hill MD  Performed By:  Asa Ward

## 2021-11-01 NOTE — ANESTHESIA PROCEDURE NOTES
Peripheral IV  Date/Time: 11/1/2021 8:15 AM  Inserted by: Johanna Samuel MD    Placement  Needle size: 18 G  Laterality: right  Location: forearm  Local anesthetic: none  Site prep: chlorhexidine  Technique: ultrasound guided  Attempts: 1

## 2021-11-01 NOTE — ANESTHESIA PROCEDURE NOTES
Airway  Date/Time: 11/1/2021 8:05 AM  Urgency: elective    Airway not difficult    General Information and Staff    Patient location during procedure: OR  Anesthesiologist: Jamal Sánchez MD  Performed: anesthesiologist     Indications and Patient Conditi

## 2021-11-01 NOTE — ANESTHESIA PREPROCEDURE EVALUATION
PRE-OP EVALUATION    Patient Name: Jimmy Demarco    Admit Diagnosis: Lumbar disc herniation with radiculopathy [M51.16]  Weakness of right leg [R29.898]    Pre-op Diagnosis: Lumbar disc herniation with radiculopathy [M51.16]  Weakness of right leg % Oral Powd Pack, Take 1 packet by mouth daily.   , Disp: , Rfl: , Past Week at Unknown time  HYDROcodone-acetaminophen (NORCO)  MG Oral Tab, Take 1 tablet by mouth every 4 (four) hours as needed for Pain., Disp: 60 tablet, Rfl: 0, 10/25/2021  aspirin facet cyst resection   • OTHER SURGICAL HISTORY  12/26/2017, 2019    cysto w/ Dr. Serge Walker History    Tobacco Use      Smoking status: Never Smoker      Smokeless tobacco: Never Used    Alcohol use: Yes      Comment: rarely      Drug use:  No

## 2021-11-01 NOTE — H&P
History & Physical Examination    Patient Name: Nivia Adkins  MRN: [id number]  CSN: [id number]  YOB: 1950    Diagnosis: Lumbar disc herniation with radiculopathy    Present Illness: Very pleasant 71 y/o male presents today for surgical emergency room treated and released.   He was having a little bit of dizziness or vertigo and they did an MRI of the brain and MRA of the brain.     He did well up until Tuesday of last week where he drove to [place] and back after which time he developed capsule by mouth daily. , Disp: , Rfl: , Past Week at Unknown time  Omega-3 Fatty Acids (FISH OIL OR), Take 1 capsule by mouth daily. , Disp: , Rfl: , Past Week at Unknown time  Cholecalciferol (VITAMIN D OR), Take 1 capsule by mouth daily.   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 Speech fluent, comprehension intact. Answering questions appropriately.    No significan

## 2021-11-01 NOTE — BRIEF OP NOTE
Pre-Operative Diagnosis: Lumbar disc herniation with radiculopathy [M51.16]  Weakness of right leg [R29.898]     Post-Operative Diagnosis: Lumbar disc herniation with radiculopathy [M51.16]Weakness of right leg [R29.898]      Procedure Performed:   RIGHT L

## 2021-11-01 NOTE — PROGRESS NOTES
NURSING ADMISSION NOTE      Patient admitted via Cart/bed from PACU post RIGHT LUMBAR 3-LUMBAR 4 MICRODISCECTOMY. RIGHT LUMBAR 4- LUMBAR 5 AND LUMBAR 5 - SACRAL 1 MICROFORAMINOTOMIES. Oriented to room. Received alert, awake, oriented x 4.   Safety pre

## 2021-11-01 NOTE — ANESTHESIA POSTPROCEDURE EVALUATION
[address] [facility] Patient Status:  Inpatient   Age/Gender 70year old male MRN [id number]   Location [facility] Attending Kervin Nesbitt MD   Hosp Day # 0 PCP Stefan Calhoun MD       Anesthesia Post

## 2021-11-02 VITALS
DIASTOLIC BLOOD PRESSURE: 64 MMHG | RESPIRATION RATE: 18 BRPM | TEMPERATURE: 97 F | HEART RATE: 67 BPM | HEIGHT: 71 IN | WEIGHT: 250.44 LBS | BODY MASS INDEX: 35.06 KG/M2 | SYSTOLIC BLOOD PRESSURE: 116 MMHG | OXYGEN SATURATION: 97 %

## 2021-11-02 PROCEDURE — 99231 SBSQ HOSP IP/OBS SF/LOW 25: CPT | Performed by: HOSPITALIST

## 2021-11-02 RX ORDER — HYDROCODONE BITARTRATE AND ACETAMINOPHEN 10; 325 MG/1; MG/1
1-2 TABLET ORAL EVERY 4 HOURS PRN
Qty: 60 TABLET | Refills: 0 | Status: SHIPPED | OUTPATIENT
Start: 2021-11-02 | End: 2021-12-09

## 2021-11-02 RX ORDER — CYCLOBENZAPRINE HCL 5 MG
5 TABLET ORAL 3 TIMES DAILY PRN
Qty: 45 TABLET | Refills: 0 | Status: SHIPPED | OUTPATIENT
Start: 2021-11-02 | End: 2021-12-09

## 2021-11-02 NOTE — PROGRESS NOTES
Reviewed indications, side effects of pain medication/narcotics and constipation prevention.  Stressed importance of increased fluids/roughage in diet, continued use stool softeners along with laxatives and suppositories as needed while taking narcotics radha

## 2021-11-02 NOTE — PHYSICAL THERAPY NOTE
PHYSICAL THERAPY QUICK EVALUATION - INPATIENT    Room Number: 361/361-A  Evaluation Date: 11/2/2021  Presenting Problem: Lumbar disc herniation with radiculopathty  Physician Order: PT Eval and Treat    Problem List  Active Problems:    Hyperlipidemia BLADDER,LOCAL ANESTH  05/2019   • CYSTOSCOPY,INSERT URETERAL STENT      with biopsy, may 2019   • FOREARM/WRIST SURGERY UNLISTED  1997    L wrist fx, ORIF   • LAMINECTOMY,LUMBAR  10/2019   • OTHER      debridment of L wrist 1999   • OTHER  10/28/2019    L steps with a railing?: None       AM-PAC Score:  Raw Score: 24   Approx Degree of Impairment: 0%   Standardized Score (AM-PAC Scale): 61.14   CMS Modifier (G-Code): CH      FUNCTIONAL ABILITY STATUS  Gait Assessment  Gait Assistance: Modified independent;S the following goals . ..     Patient was able to transfer At previous, functional level  Safely and independently   Patient able to ambulate on level surfaces At previous, functional level  Safely and independently

## 2021-11-02 NOTE — PHYSICAL THERAPY NOTE
PHYSICAL THERAPY QUICK EVALUATION - INPATIENT    Room Number: 361/361-A  Evaluation Date: 11/2/2021  Presenting Problem: Lumbar disc herniation with radiculopathty  Physician Order: PT Eval and Treat     11/1/21  Diagnosis: Lumbar disc herniation with ra and venous insufficiency   • Venous insufficiency 9/17/2010   • Visual impairment     glasses   • Wrist fracture, left 1997       Past Surgical History  Past Surgical History:   Procedure Laterality Date   • CATARACT  11/2012   • CYSTOSCOPY,DIL BLADDER, patient currently need. ..    Help from Another: Moving to and from a bed to a chair (including a wheelchair)?: None   Help from Another: Need to walk in hospital room?: None   Help from Another: Climbing 3-5 steps with a railing?: None       AM-PAC Score: Therapy needs at this time. Patient discharged from Physical Therapy services. Please re-order if a new functional limitation presents during this admission. GOALS  Patient was able to achieve the following goals . ..     Patient was able to transfer At

## 2021-11-02 NOTE — PROGRESS NOTES
[facility]  Neurosurgery Progress Note    Jose Robertoama Benito Patient Status:  Inpatient    1/3/1950 MRN [id number]   [facility] 3SW-A Attending Zakiya Nieves MD   Hosp Day # 1 PCP Melinda Smith MD     Chief Complaint:  Lumbar

## 2021-11-02 NOTE — PLAN OF CARE
Problem: s/p spinal surgery  Data: Patient alert and oriented overnight, reports back pain at an 8 on 1-10 pain scale.  Patient on 2L O2 per NC while awake and with sleep instead of wearing cpap, maintaining saturation >90%, indwelling niño catheter in sridhar environment to reduce risk of injury  - Provide assistive devices as appropriate  - Consider OT/PT consult to assist with strengthening/mobility  - Encourage toileting schedule  Outcome: Progressing     Problem: Patient/Family Goals  Goal: Patient/Family L

## 2021-11-02 NOTE — OCCUPATIONAL THERAPY NOTE
OCCUPATIONAL THERAPY QUICK EVALUATION - INPATIENT     Room Number: 361/361-A  Evaluation Date: 11/2/2021  Type of Evaluation: Quick Eval  Presenting Problem: s/p R L3-4 microdiscectomy, R L4-S1 microforaminotomies 11/1/21    Physician Order: IP Consult to environmental allergies   • Sleep apnea     CPAP   • Thrombocytopenia (HCC) 11/4/2018   • Tinnitus 9/17/2010    low grade   • Urinary urgency 9/17/2010    mild   • Varicose veins 9/17/2010    and venous insufficiency   • Venous insufficiency 9/17/2010   • within functional limits     COORDINATION  Gross Motor    [facility]    Fine Motor    WFL      ADDITIONAL TESTS                                    NEUROLOGICAL FINDINGS  Neurological Findings: None                ACTIVITY TOLERANCE                         O2 SATU (reinforced initial supervision, reports will have from wife) with good verbal understanding. Patient End of Session: Up in chair;Needs met;Call light within reach;RN aware of session/findings; All patient questions and concerns addressed; Alarm set    ASS session: Surgical mask, gloves  PPE worn by patient this session: Surgical mask

## 2021-11-02 NOTE — PLAN OF CARE
Lower back surgical site with pain level 4/10, prefers to have ice pack to back. Coverlet dressing looks clean, dry and intact. Denied numbness or tingling sensation to BLE. Baptiste removed at 0700, due to void. PT and OT to eval today.

## 2021-11-02 NOTE — PROGRESS NOTES
NURSING DISCHARGE NOTE    Discharged Home via Wheelchair. Accompanied by Spouse  Belongings Taken by patient/family.   Discharge instructions discussed with patient, he verbalized understanding

## 2021-11-02 NOTE — CONSULTS
[facility] HOSPITALIST  CONSULT     Jose Nixon [facility] Patient Status:  Inpatient    1/3/1950 MRN [id number]   [facility] 3SW-A Attending Roula Bach MD   Hosp Day # 0 PCP Alejo Torres MD     Reason for consult: med mgt    Reques Venous insufficiency 9/17/2010   • Visual impairment     glasses   • Wrist fracture, left 1997        Past Surgical History:   Past Surgical History:   Procedure Laterality Date   • CATARACT  11/2012   • CYSTOSCOPY,DIL BLADDER,LOCAL ANESTH  05/2019   • CYS Coenzyme Q10 (COQ-10 OR), Take 1 capsule by mouth daily. , Disp: , Rfl:   Omega-3 Fatty Acids (FISH OIL OR), Take 1 capsule by mouth daily. , Disp: , Rfl:   Cholecalciferol (VITAMIN D OR), Take 1 capsule by mouth daily.   , Disp: , Rfl:   psyllium (METAMU PBNP in the last 168 hours. Creatinine Kinase  No results for input(s): CK in the last 168 hours. Inflammatory Markers  No results for input(s): CRP, TRACY, LDH, DDIMER in the last 168 hours. Imaging: Imaging data reviewed in Epic.       ASSESSMENT /

## 2021-11-02 NOTE — PROGRESS NOTES
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 Camille Waterman

## 2021-11-03 NOTE — OPERATIVE REPORT
[facility]    OPERATIVE REPORT    Patient:  Bel Cohen;  YOB: 1950     CSN:  [id number]; Medical Record Number:  [id number]    Admission Date:  11/1/2021 Operation Date:  11/1/2021    . ..........................     Operating Ph processes and level of the iliac crests were palpated to estimate a skin incision from L3 to S1 The procedure was initiated with a midline skin incision down to lumbosacral fascia.  The midline lumbosacral fascia over the spinous process tips and the inters underneath the spared lamina to remove ligamentum flavum from the central canal in its entirety.  The pedicle is identified with a nerve probe and a foraminotomy is carried out with rongeurs and drilling as needed beyond the pedicle to gain very generous fo returning the spinal frame to a flat neutral spinal position and then irrigating out the FloSeal and Gelfoam. A neutral position assists epidural hemostasis.  Finally each level is individually and checked with a nerve root probe to see that, with the spine

## 2021-11-08 ENCOUNTER — TELEPHONE (OUTPATIENT)
Dept: SURGERY | Facility: CLINIC | Age: 71
End: 2021-11-08

## 2021-11-08 NOTE — TELEPHONE ENCOUNTER
Patient had Right L3-L4 Microdiscectomy and Right L4-L5, L5-L1 Microforaminotomies on 11/3/21 by Dr. Jaimie Vogel    For Lumbar Sx:    Post op day 5    1)Asked how Katlyn Tran is patient feeling in general he stated:  Still sore, moving around a lot better    2) Discuss

## 2021-11-09 ENCOUNTER — TELEPHONE (OUTPATIENT)
Dept: INTERNAL MEDICINE CLINIC | Facility: CLINIC | Age: 71
End: 2021-11-09

## 2021-11-09 ENCOUNTER — OFFICE VISIT (OUTPATIENT)
Dept: INTERNAL MEDICINE CLINIC | Facility: CLINIC | Age: 71
End: 2021-11-09
Payer: MEDICARE

## 2021-11-09 VITALS
TEMPERATURE: 98 F | RESPIRATION RATE: 18 BRPM | BODY MASS INDEX: 35.56 KG/M2 | DIASTOLIC BLOOD PRESSURE: 72 MMHG | WEIGHT: 254 LBS | HEART RATE: 76 BPM | SYSTOLIC BLOOD PRESSURE: 118 MMHG | HEIGHT: 71 IN

## 2021-11-09 DIAGNOSIS — E78.5 DYSLIPIDEMIA: ICD-10-CM

## 2021-11-09 DIAGNOSIS — D49.59 URETERAL NEOPLASM: ICD-10-CM

## 2021-11-09 DIAGNOSIS — Z98.890 S/P LUMBAR MICRODISCECTOMY: ICD-10-CM

## 2021-11-09 DIAGNOSIS — M51.16 LUMBAR DISC HERNIATION WITH RADICULOPATHY: Primary | ICD-10-CM

## 2021-11-09 DIAGNOSIS — R79.89 ELEVATED SERUM CREATININE: ICD-10-CM

## 2021-11-09 PROCEDURE — 1111F DSCHRG MED/CURRENT MED MERGE: CPT | Performed by: FAMILY MEDICINE

## 2021-11-09 PROCEDURE — 99214 OFFICE O/P EST MOD 30 MIN: CPT | Performed by: FAMILY MEDICINE

## 2021-11-09 NOTE — PROGRESS NOTES
Haseeb Jane  1/3/1950    Patient presents with:  Post-Op: MR rm 9 post op       HPI:   Haseeb Jane is a 70year old male who presents for post-op evaluation after his right lumbar microdiscectomy's and microforaminotomies on 11/1.   He i Coughing    Comment:Also TREE  Pollen                  Runny nose, Coughing   Past Medical History:   Diagnosis Date   • Actinic keratosis 9/17/2010   • Babesiosis 11/14/2018   • Back problem    • Blood loss anemia 11/4/2018   • BPH (benign prostatic hyper S/P spinal surgery     Facet arthritis of cervical region     BMI 35.0-35.9,adult     Fatty liver     Pre-op testing     Ureteral neoplasm     Urinary tract infection without hematuria     Hydronephrosis     Acute renal failure (HCC)     Urothelial cancer abdominal pain  NEURO: denies headaches    EXAM:   /72 (BP Location: Right arm, Patient Position: Sitting, Cuff Size: adult)   Pulse 76   Temp 98.1 °F (36.7 °C) (Temporal)   Resp 18   Ht 5' 11\" (1.803 m)   Wt 254 lb (115.2 kg)   BMI 35.43 kg/m²   GE

## 2021-11-09 NOTE — TELEPHONE ENCOUNTER
Future Appointments   Date Time Provider Jodie Lopez   3/11/2022  7:20 AM Birgit Pereira MD [facility]     Orders to edward- Pt informed that labs need to be completed no sooner than 2 weeks prior to the appt.  Pt aware to fast-no call back required

## 2021-11-10 ENCOUNTER — PATIENT OUTREACH (OUTPATIENT)
Dept: CASE MANAGEMENT | Age: 71
End: 2021-11-10

## 2021-11-10 NOTE — PROGRESS NOTES
[facility] message sent to the pt for condition update regarding ortho surgery.

## 2021-11-11 ENCOUNTER — OFFICE VISIT (OUTPATIENT)
Dept: SURGERY | Facility: CLINIC | Age: 71
End: 2021-11-11
Payer: MEDICARE

## 2021-11-11 VITALS
BODY MASS INDEX: 35 KG/M2 | HEART RATE: 72 BPM | RESPIRATION RATE: 16 BRPM | WEIGHT: 254 LBS | SYSTOLIC BLOOD PRESSURE: 140 MMHG | DIASTOLIC BLOOD PRESSURE: 60 MMHG

## 2021-11-11 DIAGNOSIS — Z98.890 S/P LUMBAR LAMINECTOMY: ICD-10-CM

## 2021-11-11 DIAGNOSIS — M51.16 LUMBAR DISC HERNIATION WITH RADICULOPATHY: Primary | ICD-10-CM

## 2021-11-11 PROCEDURE — 99024 POSTOP FOLLOW-UP VISIT: CPT | Performed by: PHYSICIAN ASSISTANT

## 2021-11-11 NOTE — PROGRESS NOTES
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    S/P Right L3-4 discectomy, Right L3-4-5-S1 decompression 11/1/21 Dr. Rosetta Cranker. Minimal back pain.  No numbn down the stairs he fell due to his right leg buckling he struck his head on the drywall did not lose consciousness. Today he denies any headache double vision blurry vision loss of vision. No dizziness or vertigo.   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    progressive pigmented dermatosis in lower extremities-relatively stable   • Rosacea 9/17/2010   • Seasonal allergies     spring and fall, and environmental allergies   • Sl Coughing    MEDICATIONS:  HYDROcodone-acetaminophen  MG Oral Tab, Take 1-2 tablets by mouth every 4 (four) hours as needed for Pain., Disp: 60 tablet, Rfl: 0  cyclobenzaprine 5 MG Oral Tab, Take 1 tablet (5 mg total) by mouth 3 (three) times daily as Eversion   Right       5-         5       5         5- 5    Left       5         5       5         5 5      Reflexes DTRs:     Biceps   Brachioradialis   Triceps    Patellar    Ankle  Hamstring   Right      1+           1+               1+       1+

## 2021-11-11 NOTE — PATIENT INSTRUCTIONS
PLAN:  -limit bending lifting and twisting  -FU 12/9/21  -call with changes  - PT in 4 weeks if needed

## 2021-11-11 NOTE — PROGRESS NOTES
Patient states decrease in back spasms and pain. Patient states balance has improved. Slight pain in the right upper thigh region, this started a couple days ago. This occurs on and off.

## 2021-11-17 NOTE — PROGRESS NOTES
Initial Post Discharge Follow Up   Discharge Date: 11/2/21  Contact Date: 11/17/2021    Consent Verification:  Assessment Completed With: Patient  HIPAA Verified? Yes    Discharge Dx:   S/P RIGHT LUMBAR 3-LUMBAR 4 MICRODISCECTOMY.  RIGHT LUMBAR 4- LUMBAR after surgery?  no   •   GENERAL:   Were you able to participate in any of the following educational opportunities:   Pre op class in person no   Pre op class online no   Discharge class in person no   Discharge video  no   Pt stated he read a lumbar spine

## 2021-12-09 ENCOUNTER — LAB ENCOUNTER (OUTPATIENT)
Dept: LAB | Age: 71
End: 2021-12-09
Attending: FAMILY MEDICINE
Payer: MEDICARE

## 2021-12-09 ENCOUNTER — OFFICE VISIT (OUTPATIENT)
Dept: SURGERY | Facility: CLINIC | Age: 71
End: 2021-12-09
Payer: MEDICARE

## 2021-12-09 VITALS
BODY MASS INDEX: 35.56 KG/M2 | SYSTOLIC BLOOD PRESSURE: 120 MMHG | HEIGHT: 71 IN | WEIGHT: 254 LBS | DIASTOLIC BLOOD PRESSURE: 68 MMHG | HEART RATE: 64 BPM

## 2021-12-09 DIAGNOSIS — M51.16 LUMBAR DISC HERNIATION WITH RADICULOPATHY: Primary | ICD-10-CM

## 2021-12-09 DIAGNOSIS — R79.89 ELEVATED SERUM CREATININE: ICD-10-CM

## 2021-12-09 DIAGNOSIS — Z98.890 S/P LUMBAR LAMINECTOMY: ICD-10-CM

## 2021-12-09 PROCEDURE — 99024 POSTOP FOLLOW-UP VISIT: CPT | Performed by: PHYSICIAN ASSISTANT

## 2021-12-09 PROCEDURE — 80048 BASIC METABOLIC PNL TOTAL CA: CPT

## 2021-12-09 PROCEDURE — 36415 COLL VENOUS BLD VENIPUNCTURE: CPT

## 2021-12-09 NOTE — PROGRESS NOTES
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    S/P Right L3-4 discectomy, Right L3-4-5-S1 decompression 11/1/21 Dr. Harjit Ward.     Returns today having no carlos little bit of dizziness or vertigo and they did an MRI of the brain and MRA of the brain. He did well up until Tuesday of last week where he drove to [place] and back after which time he developed right leg radiculopathy.   He is feeling the right leg i hepatitis    • History of influenza    • Hyperglycemia 9/17/2010   • Kidney stone 8/19/2011   • Lipid screening 9/13/2010   • Lumbar epidural mass ([facility] [address] 10/28/2019   • Lyme disease 11/14/2018   • Measles    • Mononucleosis    • MRSA infection 1997    L wri member; gout in an other family member; hep c in his brother. SOCIAL HISTORY:   reports that he has never smoked. He has never used smokeless tobacco. He reports current alcohol use. He reports that he does not use drugs.     ALLERGIES:    [place] Thumb adduction Intrinsics   Right         5        5         5          5 5 5 5 5 5   Left         5        5         5          5 5 5 5 5 5       Lower extremity strength:    Iliopsoas  Hamstrings   Quads    D-flexion P-flexion Eversion   Right       4+*

## 2021-12-09 NOTE — PROGRESS NOTES
Pt is here regardin week post op   RIGHT LUMBAR 3-LUMBAR 4 MICRODISCECTOMY. RIGHT LUMBAR 4- LUMBAR 5 AND LUMBAR 5 - SACRAL 1 MICROFORAMINOTOMIES. Pt states he is doing very good since the sx. Pt pain at this moment is a 0/10.

## 2021-12-09 NOTE — PATIENT INSTRUCTIONS
Refill policies:    • Allow 2-3 business days for refills; controlled substances may take longer.   • Contact your pharmacy at least 5 days prior to running out of medication and have them send an electronic request or submit request through the “request re Depending on your insurance carrier, approval may take 3-10 days. It is highly recommended patients contact their insurance carrier directly to determine coverage.   If test is done without insurance authorization, patient may be responsible for the entire sits for core strengthening.   He is going to [place] in January he can walk in a swimming pool for increasing his strength.  -He can follow-up after he returns in January if he feels like he needs physical therapy.  -Call with any changes

## 2022-02-28 NOTE — TELEPHONE ENCOUNTER
Future Appointments   Date Time Provider Jodie Jessica   10/19/2021  9:00 AM Klaudia Farris MD [facility]   11/18/2021  9:15 AM RUBÉN Mena [facility]   12/9/2021  9:15 AM RUBÉN Mena [facility]
negative

## 2022-03-09 ENCOUNTER — OFFICE VISIT (OUTPATIENT)
Dept: INTERNAL MEDICINE CLINIC | Facility: CLINIC | Age: 72
End: 2022-03-09
Payer: MEDICARE

## 2022-03-09 ENCOUNTER — TELEPHONE (OUTPATIENT)
Dept: INTERNAL MEDICINE CLINIC | Facility: CLINIC | Age: 72
End: 2022-03-09

## 2022-03-09 VITALS
HEART RATE: 61 BPM | WEIGHT: 255.63 LBS | DIASTOLIC BLOOD PRESSURE: 80 MMHG | TEMPERATURE: 99 F | BODY MASS INDEX: 35.79 KG/M2 | RESPIRATION RATE: 16 BRPM | HEIGHT: 71 IN | SYSTOLIC BLOOD PRESSURE: 135 MMHG | OXYGEN SATURATION: 95 %

## 2022-03-09 DIAGNOSIS — C68.9 UROTHELIAL CANCER (HCC): ICD-10-CM

## 2022-03-09 DIAGNOSIS — E78.5 HYPERLIPIDEMIA, UNSPECIFIED HYPERLIPIDEMIA TYPE: ICD-10-CM

## 2022-03-09 DIAGNOSIS — Z00.00 ROUTINE GENERAL MEDICAL EXAMINATION AT A HEALTH CARE FACILITY: Primary | ICD-10-CM

## 2022-03-09 DIAGNOSIS — R93.1 AGATSTON CAC SCORE, <100: ICD-10-CM

## 2022-03-09 DIAGNOSIS — Z12.5 PROSTATE CANCER SCREENING: ICD-10-CM

## 2022-03-09 DIAGNOSIS — D49.59 URETERAL NEOPLASM: ICD-10-CM

## 2022-03-09 DIAGNOSIS — G47.33 OSA (OBSTRUCTIVE SLEEP APNEA): ICD-10-CM

## 2022-03-09 PROCEDURE — G0439 PPPS, SUBSEQ VISIT: HCPCS | Performed by: INTERNAL MEDICINE

## 2022-03-09 PROCEDURE — 99214 OFFICE O/P EST MOD 30 MIN: CPT | Performed by: INTERNAL MEDICINE

## 2022-03-09 NOTE — TELEPHONE ENCOUNTER
Future Appointments   Date Time Provider Jodie Lopez   3/10/2023  8:20 AM Mirian Colón MD [facility]     Patient is scheduled for Annual Physical.  Please place orders with Cecilia Cheng. Patient aware to fast.  No call back required. Patient informed that labs need to be completed no sooner than 2 weeks prior to the appointment.

## 2022-03-18 ENCOUNTER — LAB ENCOUNTER (OUTPATIENT)
Dept: LAB | Age: 72
End: 2022-03-18
Attending: INTERNAL MEDICINE
Payer: MEDICARE

## 2022-03-18 DIAGNOSIS — N40.1 BENIGN PROSTATIC HYPERPLASIA WITH URINARY RETENTION: ICD-10-CM

## 2022-03-18 DIAGNOSIS — Z83.3 FH: DIABETES MELLITUS: ICD-10-CM

## 2022-03-18 DIAGNOSIS — Z00.00 ROUTINE GENERAL MEDICAL EXAMINATION AT A HEALTH CARE FACILITY: ICD-10-CM

## 2022-03-18 DIAGNOSIS — Z12.5 PROSTATE CANCER SCREENING: ICD-10-CM

## 2022-03-18 DIAGNOSIS — E88.81 INSULIN RESISTANCE: ICD-10-CM

## 2022-03-18 DIAGNOSIS — D64.9 ANEMIA, UNSPECIFIED TYPE: ICD-10-CM

## 2022-03-18 DIAGNOSIS — R33.8 BENIGN PROSTATIC HYPERPLASIA WITH URINARY RETENTION: ICD-10-CM

## 2022-03-18 DIAGNOSIS — E78.5 HYPERLIPIDEMIA, UNSPECIFIED HYPERLIPIDEMIA TYPE: ICD-10-CM

## 2022-03-18 LAB
ALBUMIN SERPL-MCNC: 3.7 G/DL (ref 3.4–5)
ALBUMIN/GLOB SERPL: 1 {RATIO} (ref 1–2)
ALP LIVER SERPL-CCNC: 80 U/L
ALT SERPL-CCNC: 32 U/L
ANION GAP SERPL CALC-SCNC: 5 MMOL/L (ref 0–18)
AST SERPL-CCNC: 26 U/L (ref 15–37)
BASOPHILS # BLD AUTO: 0.07 X10(3) UL (ref 0–0.2)
BASOPHILS NFR BLD AUTO: 1.2 %
BILIRUB SERPL-MCNC: 0.5 MG/DL (ref 0.1–2)
BUN BLD-MCNC: 25 MG/DL (ref 7–18)
CALCIUM BLD-MCNC: 9 MG/DL (ref 8.5–10.1)
CHLORIDE SERPL-SCNC: 110 MMOL/L (ref 98–112)
CHOLEST SERPL-MCNC: 175 MG/DL (ref ?–200)
CO2 SERPL-SCNC: 25 MMOL/L (ref 21–32)
CREAT BLD-MCNC: 1.12 MG/DL
DEPRECATED HBV CORE AB SER IA-ACNC: 72.2 NG/ML
EOSINOPHIL # BLD AUTO: 0.34 X10(3) UL (ref 0–0.7)
EOSINOPHIL NFR BLD AUTO: 5.9 %
ERYTHROCYTE [DISTWIDTH] IN BLOOD BY AUTOMATED COUNT: 13 %
FASTING PATIENT LIPID ANSWER: YES
FASTING STATUS PATIENT QL REPORTED: YES
GLOBULIN PLAS-MCNC: 3.6 G/DL (ref 2.8–4.4)
GLUCOSE BLD-MCNC: 96 MG/DL (ref 70–99)
HCT VFR BLD AUTO: 44.2 %
HDLC SERPL-MCNC: 39 MG/DL (ref 40–59)
HGB BLD-MCNC: 15 G/DL
IMM GRANULOCYTES # BLD AUTO: 0.01 X10(3) UL (ref 0–1)
IMM GRANULOCYTES NFR BLD: 0.2 %
IRON SATN MFR SERPL: 33 %
IRON SERPL-MCNC: 104 UG/DL
LDLC SERPL CALC-MCNC: 109 MG/DL (ref ?–100)
LYMPHOCYTES # BLD AUTO: 1.66 X10(3) UL (ref 1–4)
LYMPHOCYTES NFR BLD AUTO: 28.9 %
MCH RBC QN AUTO: 31.1 PG (ref 26–34)
MCHC RBC AUTO-ENTMCNC: 33.9 G/DL (ref 31–37)
MCV RBC AUTO: 91.7 FL
MONOCYTES # BLD AUTO: 0.57 X10(3) UL (ref 0.1–1)
MONOCYTES NFR BLD AUTO: 9.9 %
NEUTROPHILS # BLD AUTO: 3.09 X10 (3) UL (ref 1.5–7.7)
NEUTROPHILS # BLD AUTO: 3.09 X10(3) UL (ref 1.5–7.7)
NEUTROPHILS NFR BLD AUTO: 53.9 %
NONHDLC SERPL-MCNC: 136 MG/DL (ref ?–130)
OSMOLALITY SERPL CALC.SUM OF ELEC: 294 MOSM/KG (ref 275–295)
PLATELET # BLD AUTO: 206 10(3)UL (ref 150–450)
POTASSIUM SERPL-SCNC: 4.5 MMOL/L (ref 3.5–5.1)
PROT SERPL-MCNC: 7.3 G/DL (ref 6.4–8.2)
RBC # BLD AUTO: 4.82 X10(6)UL
SODIUM SERPL-SCNC: 140 MMOL/L (ref 136–145)
TIBC SERPL-MCNC: 311 UG/DL (ref 240–450)
TRANSFERRIN SERPL-MCNC: 209 MG/DL (ref 200–360)
TRIGL SERPL-MCNC: 155 MG/DL (ref 30–149)
VLDLC SERPL CALC-MCNC: 26 MG/DL (ref 0–30)
WBC # BLD AUTO: 5.7 X10(3) UL (ref 4–11)

## 2022-03-18 PROCEDURE — 85025 COMPLETE CBC W/AUTO DIFF WBC: CPT

## 2022-03-18 PROCEDURE — 80061 LIPID PANEL: CPT

## 2022-03-18 PROCEDURE — 82728 ASSAY OF FERRITIN: CPT

## 2022-03-18 PROCEDURE — 83540 ASSAY OF IRON: CPT

## 2022-03-18 PROCEDURE — 83550 IRON BINDING TEST: CPT

## 2022-03-18 PROCEDURE — 36415 COLL VENOUS BLD VENIPUNCTURE: CPT

## 2022-03-18 PROCEDURE — 80053 COMPREHEN METABOLIC PANEL: CPT

## 2022-09-19 ENCOUNTER — OFFICE VISIT (OUTPATIENT)
Dept: INTERNAL MEDICINE CLINIC | Facility: CLINIC | Age: 72
End: 2022-09-19

## 2022-09-19 VITALS
HEART RATE: 70 BPM | HEIGHT: 71 IN | BODY MASS INDEX: 34.72 KG/M2 | WEIGHT: 248 LBS | DIASTOLIC BLOOD PRESSURE: 64 MMHG | SYSTOLIC BLOOD PRESSURE: 136 MMHG | TEMPERATURE: 97 F

## 2022-09-19 DIAGNOSIS — M25.511 CHRONIC PAIN OF BOTH SHOULDERS: Primary | ICD-10-CM

## 2022-09-19 DIAGNOSIS — M25.512 CHRONIC PAIN OF BOTH SHOULDERS: Primary | ICD-10-CM

## 2022-09-19 DIAGNOSIS — G89.29 CHRONIC LEFT HIP PAIN: ICD-10-CM

## 2022-09-19 DIAGNOSIS — G89.29 CHRONIC PAIN OF BOTH SHOULDERS: Primary | ICD-10-CM

## 2022-09-19 DIAGNOSIS — M25.552 CHRONIC LEFT HIP PAIN: ICD-10-CM

## 2022-09-19 PROCEDURE — 99214 OFFICE O/P EST MOD 30 MIN: CPT | Performed by: FAMILY MEDICINE

## 2022-09-20 ENCOUNTER — HOSPITAL ENCOUNTER (OUTPATIENT)
Dept: GENERAL RADIOLOGY | Age: 72
Discharge: HOME | End: 2022-09-20
Attending: FAMILY MEDICINE

## 2022-09-20 ENCOUNTER — LAB ENCOUNTER (OUTPATIENT)
Dept: LAB | Age: 72
End: 2022-09-20
Attending: UROLOGY

## 2022-09-20 DIAGNOSIS — M25.552 CHRONIC LEFT HIP PAIN: ICD-10-CM

## 2022-09-20 DIAGNOSIS — M25.512 CHRONIC PAIN OF BOTH SHOULDERS: ICD-10-CM

## 2022-09-20 DIAGNOSIS — G89.29 CHRONIC LEFT HIP PAIN: ICD-10-CM

## 2022-09-20 DIAGNOSIS — G89.29 CHRONIC PAIN OF BOTH SHOULDERS: ICD-10-CM

## 2022-09-20 DIAGNOSIS — M25.511 CHRONIC PAIN OF BOTH SHOULDERS: ICD-10-CM

## 2022-09-20 DIAGNOSIS — Z12.5 PROSTATE CANCER SCREENING: ICD-10-CM

## 2022-09-20 LAB — COMPLEXED PSA SERPL-MCNC: 0.18 NG/ML (ref ?–4)

## 2022-09-20 PROCEDURE — 81003 URINALYSIS AUTO W/O SCOPE: CPT | Performed by: UROLOGY

## 2022-09-20 PROCEDURE — 73030 X-RAY EXAM OF SHOULDER: CPT | Performed by: FAMILY MEDICINE

## 2022-09-20 PROCEDURE — 36415 COLL VENOUS BLD VENIPUNCTURE: CPT

## 2022-09-20 PROCEDURE — 73502 X-RAY EXAM HIP UNI 2-3 VIEWS: CPT | Performed by: FAMILY MEDICINE

## 2022-09-21 ENCOUNTER — PATIENT MESSAGE (OUTPATIENT)
Dept: INTERNAL MEDICINE CLINIC | Facility: CLINIC | Age: 72
End: 2022-09-21

## 2022-09-21 DIAGNOSIS — M19.011 PRIMARY OSTEOARTHRITIS OF SHOULDERS, BILATERAL: Primary | ICD-10-CM

## 2022-09-21 DIAGNOSIS — M25.552 GREATER TROCHANTERIC PAIN SYNDROME OF LEFT LOWER EXTREMITY: ICD-10-CM

## 2022-09-21 DIAGNOSIS — M19.012 PRIMARY OSTEOARTHRITIS OF SHOULDERS, BILATERAL: Primary | ICD-10-CM

## 2022-09-21 NOTE — TELEPHONE ENCOUNTER
Patient sent my chart message with information regarding Physical Therapy. Last office visit - 9/19/2022- Bilateral Shoulder Pain & Chronic Left hip pain    [facility] ([facility]  Hari DOMINGUEZ. [address], [place]  Office: [phone]    Order previously placed. Routed to PCP for an Update. Routed to [facility] Clinical Pool to be printed and faxed.

## 2022-09-21 NOTE — TELEPHONE ENCOUNTER
From: Gelacio Sol  To: Yuri Knight MD  Sent: 9/21/2022 9:39 AM CDT  Subject: PT referral    Dr. Effie Ramirez,  The physical therapist I have used in the past is [facility]. He is located at [address] [place] [address], [address], [place], [address] [place] [address]  Office: [phone]  Fax: [phone]  Thank you,    Myrtle Bean

## 2022-10-11 ENCOUNTER — TELEPHONE (OUTPATIENT)
Dept: INTERNAL MEDICINE CLINIC | Facility: CLINIC | Age: 72
End: 2022-10-11

## 2022-11-21 ENCOUNTER — MED REC SCAN ONLY (OUTPATIENT)
Dept: INTERNAL MEDICINE CLINIC | Facility: CLINIC | Age: 72
End: 2022-11-21

## 2022-12-14 ENCOUNTER — TELEPHONE (OUTPATIENT)
Dept: INTERNAL MEDICINE CLINIC | Facility: CLINIC | Age: 72
End: 2022-12-14

## 2022-12-14 NOTE — TELEPHONE ENCOUNTER
Problems   The patient or proxy has not reviewed this information, and there are updates pending:   Requested Problem Additions Date Noted Reported By  Comments   History of Lyme disease 11/4/2018 Jason Jassi    History of babesiosis 11/4/2018 Jason Jassi    History of kidney stones 8/1/2008 Jason Jassi      Requested Problem Removals Date Noted Reported By  Comments   Kidney stone 8/19/2011 Jason Jassi No kidney stones for 8 years   Hydronephrosis  Jason Jassi Follow up [address] beg for hydronephrosis   Urinary tract infection without hematuria 12/18/2020 Jason Jassi Resolved   Pyelonephritis of right kidney  Jason Jassi Resolved     Medications   The patient or proxy has not reviewed this information, and there are updates pending:   Requested Medication Additions Start Date Reported By  Comments   Fish Oil [address] on going     Allergies   The patient or proxy has not reviewed this information. [address] [address] for clinical updates as requested by pt?

## 2022-12-15 RX ORDER — ROSUVASTATIN CALCIUM 20 MG/1
20 TABLET, COATED ORAL NIGHTLY
Qty: 90 TABLET | Refills: 0 | Status: SHIPPED | OUTPATIENT
Start: 2022-12-15

## 2022-12-15 NOTE — TELEPHONE ENCOUNTER
Cholesterol Medication Protocol Passed 12/14/2022 05:43 PM   Protocol Details  ALT < 80    ALT resulted within past year    Lipid panel within past 12 months    Appointment within past 12 or next 3 months        LOV 3/3/21     LAST LAB 4/13/22     LAST RX  3/3/21 90 with 3      Next OV   Future Appointments   Date Time Provider Jodie Lopez   12/20/2022  9:00 AM Juancarlos Madrigal MD [facility]   2/9/2023  8:00 AM SRIDHAR Bernal [facility] [id number]   3/10/2023  8:20 AM Birgit Pereira MD [facility]       PROTOCOL pass

## 2022-12-27 ENCOUNTER — OFFICE VISIT (OUTPATIENT)
Dept: INTERNAL MEDICINE CLINIC | Facility: CLINIC | Age: 72
End: 2022-12-27
Payer: MEDICARE

## 2022-12-27 VITALS
WEIGHT: 250.38 LBS | OXYGEN SATURATION: 98 % | SYSTOLIC BLOOD PRESSURE: 120 MMHG | DIASTOLIC BLOOD PRESSURE: 70 MMHG | HEART RATE: 76 BPM | BODY MASS INDEX: 35.84 KG/M2 | HEIGHT: 70 IN | RESPIRATION RATE: 16 BRPM

## 2022-12-27 DIAGNOSIS — Z83.3 FAMILY HISTORY OF DIABETES MELLITUS IN MOTHER: ICD-10-CM

## 2022-12-27 DIAGNOSIS — E66.9 OBESITY (BMI 30.0-34.9): ICD-10-CM

## 2022-12-27 DIAGNOSIS — Z51.81 THERAPEUTIC DRUG MONITORING: Primary | ICD-10-CM

## 2022-12-27 DIAGNOSIS — G47.33 OSA (OBSTRUCTIVE SLEEP APNEA): ICD-10-CM

## 2022-12-27 DIAGNOSIS — M51.16 LUMBAR DISC HERNIATION WITH RADICULOPATHY: ICD-10-CM

## 2022-12-27 DIAGNOSIS — E78.49 OTHER HYPERLIPIDEMIA: ICD-10-CM

## 2022-12-27 DIAGNOSIS — K76.0 FATTY LIVER: ICD-10-CM

## 2022-12-27 DIAGNOSIS — E88.81 INSULIN RESISTANCE: ICD-10-CM

## 2022-12-27 PROBLEM — Z86.19 HISTORY OF BABESIOSIS: Status: ACTIVE | Noted: 2018-11-04

## 2022-12-27 PROBLEM — I65.21 CAROTID STENOSIS, RIGHT: Status: ACTIVE | Noted: 2021-08-23

## 2022-12-27 PROBLEM — R45.86 MOOD CHANGE: Status: ACTIVE | Noted: 2020-07-15

## 2022-12-27 PROBLEM — R39.9 LOWER URINARY TRACT SYMPTOMS (LUTS): Status: ACTIVE | Noted: 2021-05-06

## 2022-12-27 PROBLEM — K44.9 HIATAL HERNIA: Status: ACTIVE | Noted: 2020-10-29

## 2022-12-27 PROCEDURE — 99204 OFFICE O/P NEW MOD 45 MIN: CPT | Performed by: NURSE PRACTITIONER

## 2022-12-27 NOTE — PATIENT INSTRUCTIONS
Welcome to the [facility] Weight Management Program!!  Thank you for placing your trust in our health care team, I look forward to working with you along this journey to better health! Next steps:     1.  Schedule a personal nutrition consultation with one of our registered dieticians. Bring along your food journal (3 days minimum). See journal options below. 2.  Fill your prescribed medication and take as discussed and prescribed: rybelsus 3mg daily (take on an empty stomach) and wait 30 minutes to eat or drink anything  3. Get a1c done     Please try to work on the following dietary changes this first month:  Daily protein recommendation to start: 100-130 grams  Daily carbohydrate: <145g  Daily calories: 1,700  1. Drink water with meals and throughout the day, cut down on soda and/or juice if consumed. Consider flavored water options like Bubbly, Spindrift, Hint and Romario. 2.  Eat breakfast daily and focus on having protein with each meal, examples include: greek yogurt, cottage cheese, hard boiled egg, whole grain toast with peanut butter. 3.  Reduce refined carbohydrates and sugars which includes items such as sweets, as well as rice, pasta, and bread and make sure to choose whole grain options when having them with just 1 serving per meal about the size of your inner palm. 4.  Consume non starchy veggies daily working towards making them a good 50% of your daily food intake. Add them to lunch and dinner consistently. 5.  Start a daily probiotic: VSL#3 is recommended, (order on line at www.vsl3. com). Take 1 capsule daily with water for 30 days, then reduce to 1 every other day (this will reduce the cost). Capsules can be left out for 2 weeks, but then must be refrigerated. Please download alex My Fitness Jose C Primus! Or Net Diary to monitor daily dietary intake and you will be able to see if you are eating the right amount of calories or too much or too little which would hinder weight loss. Additionally this will help to see your daily carbohydrate and protein intake. When you set the ming up choose 1-2 lbs/week as a goal.  Keeping a paper food journal is an option as well to remain accountable for your choices- this is the start to mindful eating! A low calorie diet has been consistently shown to support weight loss. Continue or start exercising to help establish a routine. If not already exercising begin with 1 day and progress as able with long-term goal of 30 minutes 5 days a week at a minimum. Meditation daily can help manage and control stress. Chronic stress can make weight loss difficult. Exercising is one way to help with stress, but meditation using the CALM Ming or another comparable alternative can be done in your home or place of work with little time commitment. This Ming can also help work on behavior change and improve sleep. Check out the segment under Calm Masterclass and listen to The 4 Pillars of Health. A great way to begin learning about the foundation of lifestyle with practical tips to use in your every day. Check out www.yourweightmatters. org blog for continued daily support and education along this weight loss journey! Patient Resources:     Personal Training/Fitness Classes/Health Coaching     Baldev Kimball and Cabrera Alejandra @ [email or web address]/ Full fitness center with group fitness and personal training. Discount available as client of [facility] Weight Management. Health Coaching and Personal Training with Jennifer Hilliard at our [facility]- individual weekly coaching with option to add personal training and small group fitness classes targeted at weight loss- [phone] and/or email @ [email or web address]  360FIT Lisa [email or web address] Group Fitness [phone] and/or email Alyssa Pablo at [email or web address]  [address] with multiple locations: Visible Path Fitness (www.Bitrockr), Eat The Frog Fitness (www.eatLendUp. Contractually), Fit Body Bootcamp (www.fitbodybootcamp.Contractually), Catmoji Fitness (www.Analytics Engines. Contractually), The Exercise  (www.exercisecoach.Contractually)     Online Fitness  Fitness  on [employer] in 10 DVD series- www. raqxl09YQB. Contractually  Sit and Be Fit - Chair exercise series Www.sitandbefit. org  Hip Hop Fit with Elijah Morales at www.hiphopfit. net     Apps for on the Apigee 7 Minute Workout (orange box with white 7) - free on the go HIIT training ming  Peloton Ming @ TapBlaze     Nutrition Trackers and Tools  LoseIT! And My Fitness Pal apps and on line for tracking nutrition  NOOM - virtual health coaching  FitFoundation (healthy meals on the go) in [facility] @ [employer] [email or web address]. Ricky Jane MD @ wwwDatical and Sushma Vela (keto and low carb plans recommended) @ www. PVTMHE06.BVV, Metabolic Meals @ www. MyMetabolicMeals. com - individual prepared meals to go  LinQpay, CircleBack Lending, [employer], Every Plate, EchoFirst- on line meal delivery programs for preparation at home  [place] DreamCloset.com in [place] for homemade meals to go @ wwwAura XM. Contractually  Diet Doctor @ www. dietdoctor. com - low carb swaps  Marley Spoon - meal prep and planning ming (www.yummly. com)     Stress Management/Behavior/Mindful Eating  CALM meditation ming (www.calm. Contractually)  Headspace  Am I Hungry? Mindful eating virtual  ming  Www.yourweightmatters. org - Obesity Action Coalition sponsored Blog posts daily  Motivation ming (black box with white \")- daily supportive messages sent to your phone     Books/Video Education/Podcasts  Mindless Eating by Panchito Gaytan  Why We Get Sick by Darci Cisneros (a book about insulin resistance)  Atomic Habits by Radha Rodriguez (a book about taking small steps to promote greater behavior change)   Can't Hurt Me by Yaakov Griffin (a book exploring the power of discipline in achieving your goals)  The End of Dieting:  How to Live for Life by Dr. Jim Ray Daniel Castro M.D. or listen to The 1995 [facility] Episode 63: Understanding \"Nutritarian\" Eating w/Dr. Wang [place]  Your Body in Balance: The reMail of Food, Hormones, and Health by Dr. Hazel Velasco  The Menopause Diet Plan by Ilan Sim and [facility] HOSP AT [facility]  The Complete Guide to fasting by Dr. Allen Castillo, [facility] by Aram Hyaes, Ph.D, R.D. Weight Loss Surgery Will Not Treat Food Addiction by Sharad Ghosh Ph.D  The [address] [place] [address] on plant based nutrition  Fed Up - documentary about obesity (Free on Shutln)  The Truth About Sugar - documentary on sugar (Free on TELA Bio, https://youtu. be/6N6lqwgUH1u)  The Dr. Fabio Sharma by Dr. Mike Gruber MD  Fitlosophy Fitspiration - journal to better health (found at [employer] in fitness aisle)  What Happened to You?- a look at the impact trauma has on behavior written by Marina Shelley and Dr. Isidra Patel Again by Derrick Chadwick - discovering your true self after trauma  Rea Blanc talk on Blink (air taxi), The Call to Courage  Podcasts: The Exam Room by the Physician's Committee, Nutrition Facts by Dr. Yue Decker    We are here to support you with weight loss, but please remember that you still need your primary care provider for your routine health maintenance.

## 2022-12-28 ENCOUNTER — LAB ENCOUNTER (OUTPATIENT)
Dept: LAB | Age: 72
End: 2022-12-28
Attending: NURSE PRACTITIONER
Payer: MEDICARE

## 2022-12-28 DIAGNOSIS — E66.9 OBESITY (BMI 30.0-34.9): ICD-10-CM

## 2022-12-28 DIAGNOSIS — Z83.3 FAMILY HISTORY OF DIABETES MELLITUS IN MOTHER: ICD-10-CM

## 2022-12-28 DIAGNOSIS — E88.81 INSULIN RESISTANCE: ICD-10-CM

## 2022-12-28 DIAGNOSIS — Z51.81 THERAPEUTIC DRUG MONITORING: ICD-10-CM

## 2022-12-28 LAB
EST. AVERAGE GLUCOSE BLD GHB EST-MCNC: 117 MG/DL (ref 68–126)
HBA1C MFR BLD: 5.7 % (ref ?–5.7)

## 2022-12-28 PROCEDURE — 83036 HEMOGLOBIN GLYCOSYLATED A1C: CPT

## 2022-12-28 PROCEDURE — 36415 COLL VENOUS BLD VENIPUNCTURE: CPT

## 2023-01-13 ENCOUNTER — PATIENT MESSAGE (OUTPATIENT)
Dept: INTERNAL MEDICINE CLINIC | Facility: CLINIC | Age: 73
End: 2023-01-13

## 2023-01-16 RX ORDER — ORAL SEMAGLUTIDE 7 MG/1
7 TABLET ORAL DAILY
Qty: 90 TABLET | Refills: 0 | Status: SHIPPED | OUTPATIENT
Start: 2023-01-16 | End: 2023-02-15

## 2023-01-16 NOTE — TELEPHONE ENCOUNTER
From: Dana Bal  To: SRIDHAR Samayoa  Sent: 1/13/2023 3:41 PM CST  Subject: Nestor Day,  Happy New Year. I have been taking 3mg Rybelsus for about 10 days without any issues. I am currently in [place] and will run out of medication before I return. I would appreciate if you would send a 90 day prescription for 7mg to [facility] (mail order) if you are in agreement. I have listed an alternative address on my account. I believe that it is on my formulary. Thank you.   Best regards,  Angeles Eason

## 2023-03-07 ENCOUNTER — TELEPHONE (OUTPATIENT)
Dept: INTERNAL MEDICINE CLINIC | Facility: CLINIC | Age: 73
End: 2023-03-07

## 2023-03-07 DIAGNOSIS — E88.81 INSULIN RESISTANCE: Primary | ICD-10-CM

## 2023-03-07 DIAGNOSIS — E78.49 OTHER HYPERLIPIDEMIA: ICD-10-CM

## 2023-03-07 DIAGNOSIS — E66.9 OBESITY (BMI 30.0-34.9): ICD-10-CM

## 2023-03-07 DIAGNOSIS — Z83.3 FAMILY HISTORY OF DIABETES MELLITUS IN MOTHER: ICD-10-CM

## 2023-03-08 ENCOUNTER — OFFICE VISIT (OUTPATIENT)
Dept: INTERNAL MEDICINE CLINIC | Facility: CLINIC | Age: 73
End: 2023-03-08
Payer: MEDICARE

## 2023-03-08 VITALS — BODY MASS INDEX: 35 KG/M2 | WEIGHT: 243.63 LBS

## 2023-03-08 DIAGNOSIS — E66.9 OBESITY (BMI 30.0-34.9): ICD-10-CM

## 2023-03-10 ENCOUNTER — OFFICE VISIT (OUTPATIENT)
Dept: INTERNAL MEDICINE CLINIC | Facility: CLINIC | Age: 73
End: 2023-03-10
Payer: MEDICARE

## 2023-03-10 VITALS
DIASTOLIC BLOOD PRESSURE: 72 MMHG | BODY MASS INDEX: 34.79 KG/M2 | WEIGHT: 243 LBS | SYSTOLIC BLOOD PRESSURE: 124 MMHG | HEART RATE: 70 BPM | TEMPERATURE: 98 F | HEIGHT: 70 IN

## 2023-03-10 DIAGNOSIS — I65.23 ATHEROSCLEROSIS OF BOTH CAROTID ARTERIES: ICD-10-CM

## 2023-03-10 DIAGNOSIS — Z98.890 S/P SPINAL SURGERY: ICD-10-CM

## 2023-03-10 DIAGNOSIS — G47.33 OSA (OBSTRUCTIVE SLEEP APNEA): ICD-10-CM

## 2023-03-10 DIAGNOSIS — L98.9 SKIN LESION: ICD-10-CM

## 2023-03-10 DIAGNOSIS — Z87.448 HISTORY OF HEMATURIA: ICD-10-CM

## 2023-03-10 DIAGNOSIS — E78.49 OTHER HYPERLIPIDEMIA: ICD-10-CM

## 2023-03-10 DIAGNOSIS — C68.9 UROTHELIAL CANCER (HCC): ICD-10-CM

## 2023-03-10 DIAGNOSIS — R39.9 LOWER URINARY TRACT SYMPTOMS (LUTS): ICD-10-CM

## 2023-03-10 DIAGNOSIS — Z00.00 ROUTINE GENERAL MEDICAL EXAMINATION AT A HEALTH CARE FACILITY: Primary | ICD-10-CM

## 2023-03-10 DIAGNOSIS — R93.1 AGATSTON CAC SCORE, <100: ICD-10-CM

## 2023-03-10 DIAGNOSIS — I65.21 CAROTID STENOSIS, RIGHT: ICD-10-CM

## 2023-03-10 PROBLEM — F39 MOOD DISORDER (HCC): Status: RESOLVED | Noted: 2018-12-06 | Resolved: 2023-03-10

## 2023-03-10 PROBLEM — F39 MOOD DISORDER: Status: RESOLVED | Noted: 2018-12-06 | Resolved: 2023-03-10

## 2023-03-10 PROCEDURE — 99214 OFFICE O/P EST MOD 30 MIN: CPT | Performed by: INTERNAL MEDICINE

## 2023-03-10 PROCEDURE — G0439 PPPS, SUBSEQ VISIT: HCPCS | Performed by: INTERNAL MEDICINE

## 2023-03-10 PROCEDURE — 1125F AMNT PAIN NOTED PAIN PRSNT: CPT | Performed by: INTERNAL MEDICINE

## 2023-03-10 RX ORDER — ROSUVASTATIN CALCIUM 20 MG/1
20 TABLET, COATED ORAL NIGHTLY
Qty: 90 TABLET | Refills: 3 | Status: SHIPPED | OUTPATIENT
Start: 2023-03-10

## 2023-03-10 RX ORDER — ORAL SEMAGLUTIDE 7 MG/1
1 TABLET ORAL DAILY
COMMUNITY
Start: 2023-01-03

## 2023-03-20 ENCOUNTER — LAB ENCOUNTER (OUTPATIENT)
Dept: LAB | Age: 73
End: 2023-03-20
Attending: INTERNAL MEDICINE
Payer: MEDICARE

## 2023-03-20 DIAGNOSIS — Z87.448 HISTORY OF HEMATURIA: ICD-10-CM

## 2023-03-20 DIAGNOSIS — E78.5 HYPERLIPIDEMIA, UNSPECIFIED HYPERLIPIDEMIA TYPE: ICD-10-CM

## 2023-03-20 DIAGNOSIS — Z00.00 ROUTINE GENERAL MEDICAL EXAMINATION AT A HEALTH CARE FACILITY: ICD-10-CM

## 2023-03-20 LAB
ALBUMIN SERPL-MCNC: 3.5 G/DL (ref 3.4–5)
ALBUMIN/GLOB SERPL: 0.9 {RATIO} (ref 1–2)
ALP LIVER SERPL-CCNC: 75 U/L
ALT SERPL-CCNC: 43 U/L
ANION GAP SERPL CALC-SCNC: 4 MMOL/L (ref 0–18)
AST SERPL-CCNC: 23 U/L (ref 15–37)
BASOPHILS # BLD AUTO: 0.06 X10(3) UL (ref 0–0.2)
BASOPHILS NFR BLD AUTO: 0.8 %
BILIRUB SERPL-MCNC: 0.6 MG/DL (ref 0.1–2)
BILIRUB UR QL STRIP.AUTO: NEGATIVE
BUN BLD-MCNC: 21 MG/DL (ref 7–18)
CALCIUM BLD-MCNC: 9.3 MG/DL (ref 8.5–10.1)
CHLORIDE SERPL-SCNC: 105 MMOL/L (ref 98–112)
CHOLEST SERPL-MCNC: 112 MG/DL (ref ?–200)
CLARITY UR REFRACT.AUTO: CLEAR
CO2 SERPL-SCNC: 30 MMOL/L (ref 21–32)
COLOR UR AUTO: YELLOW
CREAT BLD-MCNC: 1.26 MG/DL
EOSINOPHIL # BLD AUTO: 0.29 X10(3) UL (ref 0–0.7)
EOSINOPHIL NFR BLD AUTO: 3.9 %
ERYTHROCYTE [DISTWIDTH] IN BLOOD BY AUTOMATED COUNT: 12.5 %
FASTING PATIENT LIPID ANSWER: YES
FASTING STATUS PATIENT QL REPORTED: YES
GFR SERPLBLD BASED ON 1.73 SQ M-ARVRAT: 60 ML/MIN/1.73M2 (ref 60–?)
GLOBULIN PLAS-MCNC: 3.9 G/DL (ref 2.8–4.4)
GLUCOSE BLD-MCNC: 97 MG/DL (ref 70–99)
GLUCOSE UR STRIP.AUTO-MCNC: NEGATIVE MG/DL
HCT VFR BLD AUTO: 46.5 %
HDLC SERPL-MCNC: 43 MG/DL (ref 40–59)
HGB BLD-MCNC: 15.8 G/DL
IMM GRANULOCYTES # BLD AUTO: 0.03 X10(3) UL (ref 0–1)
IMM GRANULOCYTES NFR BLD: 0.4 %
KETONES UR STRIP.AUTO-MCNC: NEGATIVE MG/DL
LDLC SERPL CALC-MCNC: 43 MG/DL (ref ?–100)
LYMPHOCYTES # BLD AUTO: 1.78 X10(3) UL (ref 1–4)
LYMPHOCYTES NFR BLD AUTO: 23.6 %
MCH RBC QN AUTO: 31 PG (ref 26–34)
MCHC RBC AUTO-ENTMCNC: 34 G/DL (ref 31–37)
MCV RBC AUTO: 91.4 FL
MONOCYTES # BLD AUTO: 0.76 X10(3) UL (ref 0.1–1)
MONOCYTES NFR BLD AUTO: 10.1 %
NEUTROPHILS # BLD AUTO: 4.61 X10 (3) UL (ref 1.5–7.7)
NEUTROPHILS # BLD AUTO: 4.61 X10(3) UL (ref 1.5–7.7)
NEUTROPHILS NFR BLD AUTO: 61.2 %
NITRITE UR QL STRIP.AUTO: NEGATIVE
NONHDLC SERPL-MCNC: 69 MG/DL (ref ?–130)
OSMOLALITY SERPL CALC.SUM OF ELEC: 291 MOSM/KG (ref 275–295)
PH UR STRIP.AUTO: 6 [PH] (ref 5–8)
PLATELET # BLD AUTO: 193 10(3)UL (ref 150–450)
POTASSIUM SERPL-SCNC: 4 MMOL/L (ref 3.5–5.1)
PROT SERPL-MCNC: 7.4 G/DL (ref 6.4–8.2)
PROT UR STRIP.AUTO-MCNC: NEGATIVE MG/DL
RBC # BLD AUTO: 5.09 X10(6)UL
RBC UR QL AUTO: NEGATIVE
SODIUM SERPL-SCNC: 139 MMOL/L (ref 136–145)
SP GR UR STRIP.AUTO: 1.01 (ref 1–1.03)
TRIGL SERPL-MCNC: 155 MG/DL (ref 30–149)
UROBILINOGEN UR STRIP.AUTO-MCNC: <2 MG/DL
VLDLC SERPL CALC-MCNC: 22 MG/DL (ref 0–30)
WBC # BLD AUTO: 7.5 X10(3) UL (ref 4–11)

## 2023-03-20 PROCEDURE — 85025 COMPLETE CBC W/AUTO DIFF WBC: CPT

## 2023-03-20 PROCEDURE — 80053 COMPREHEN METABOLIC PANEL: CPT

## 2023-03-20 PROCEDURE — 36415 COLL VENOUS BLD VENIPUNCTURE: CPT

## 2023-03-20 PROCEDURE — 81001 URINALYSIS AUTO W/SCOPE: CPT

## 2023-03-20 PROCEDURE — 80061 LIPID PANEL: CPT

## 2023-04-13 ENCOUNTER — PATIENT MESSAGE (OUTPATIENT)
Dept: INTERNAL MEDICINE CLINIC | Facility: CLINIC | Age: 73
End: 2023-04-13

## 2023-04-13 ENCOUNTER — OFFICE VISIT (OUTPATIENT)
Dept: INTERNAL MEDICINE CLINIC | Facility: CLINIC | Age: 73
End: 2023-04-13
Payer: MEDICARE

## 2023-04-13 VITALS
HEIGHT: 70 IN | SYSTOLIC BLOOD PRESSURE: 140 MMHG | BODY MASS INDEX: 35.07 KG/M2 | RESPIRATION RATE: 16 BRPM | DIASTOLIC BLOOD PRESSURE: 79 MMHG | WEIGHT: 245 LBS | OXYGEN SATURATION: 96 % | HEART RATE: 71 BPM

## 2023-04-13 DIAGNOSIS — K76.0 FATTY LIVER: ICD-10-CM

## 2023-04-13 DIAGNOSIS — E78.49 OTHER HYPERLIPIDEMIA: ICD-10-CM

## 2023-04-13 DIAGNOSIS — E88.81 INSULIN RESISTANCE: ICD-10-CM

## 2023-04-13 DIAGNOSIS — Z51.81 THERAPEUTIC DRUG MONITORING: Primary | ICD-10-CM

## 2023-04-13 DIAGNOSIS — G47.33 OSA (OBSTRUCTIVE SLEEP APNEA): ICD-10-CM

## 2023-04-13 DIAGNOSIS — M51.16 LUMBAR DISC HERNIATION WITH RADICULOPATHY: ICD-10-CM

## 2023-04-13 DIAGNOSIS — E66.9 OBESITY (BMI 30.0-34.9): ICD-10-CM

## 2023-04-13 PROCEDURE — 99213 OFFICE O/P EST LOW 20 MIN: CPT | Performed by: NURSE PRACTITIONER

## 2023-04-13 PROCEDURE — 1126F AMNT PAIN NOTED NONE PRSNT: CPT | Performed by: NURSE PRACTITIONER

## 2023-04-13 RX ORDER — AMOXICILLIN 500 MG
CAPSULE ORAL
COMMUNITY

## 2023-04-13 RX ORDER — ORAL SEMAGLUTIDE 14 MG/1
14 TABLET ORAL DAILY
Qty: 90 TABLET | Refills: 0 | Status: SHIPPED | OUTPATIENT
Start: 2023-04-13 | End: 2023-05-13

## 2023-04-13 NOTE — TELEPHONE ENCOUNTER
From: Kimberly Nunez  To: SRIDHAR Boudreaux  Sent: 4/13/2023 9:36 AM CDT  Subject: Rybelsus     It was nice seeing you today. According to my .com alex, I should be covered for the 14mg dose age of Rybelsus. Thank you.   Luna Lim

## 2023-04-13 NOTE — PATIENT INSTRUCTIONS
Next steps:  1. Fill your prescribed medication and take as discussed and prescribed: rybelsus 14mg daily   2. Schedule a personal nutrition consultation with one of our registered dieticians    1. Drink water with meals and throughout the day, cut down on soda and/or juice if consumed. Consider flavored water options like Bubbly, Spindrift, Hint and Romario. 2.  Eat breakfast daily and focus on having protein with each meal, examples include: greek yogurt, cottage cheese, hard boiled egg, whole grain toast with peanut butter. 3.  Reduce refined carbohydrates and sugars which includes items such as sweets, as well as rice, pasta, and bread and make sure to choose whole grain options when having them with just 1 serving per meal about the size of your inner palm. 4.  Consume non starchy veggies daily working towards making them a good 50% of your daily food intake. Add them to lunch and dinner consistently. 5.  Start a daily probiotic: VSL#3 is recommended, (order on line at www.vsl3. com). Take 1 capsule daily with water for 30 days, then reduce to 1 every other day (this will reduce the cost). Capsules can be left out for 2 weeks, but then must be refrigerated. Please download alex My Fitness Lavona Crea! Or Net Diary to monitor daily dietary intake and you will be able to see if you are eating the right amount of calories or too much or too little which would hinder weight loss. Additionally this will help to see your daily carbohydrate and protein intake. When you set the alex up choose 1-2 lbs/week as a goal.  Keeping a paper food journal is an option as well to remain accountable for your choices- this is the start to mindful eating! A low calorie diet has been consistently shown to support weight loss. Continue or start exercising to help establish a routine. If not already exercising begin with 1 day and progress as able with long-term goal of 30 minutes 5 days a week at a minimum. Meditation daily can help manage and control stress. Chronic stress can make weight loss difficult. Exercising is one way to help with stress, but meditation using the CALM Ming or another comparable alternative can be done in your home or place of work with little time commitment. This Ming can also help work on behavior change and improve sleep. Check out the segment under Calm Masterclass and listen to The 4 Pillars of Health. A great way to begin learning about the foundation of lifestyle with practical tips to use in your every day. Check out www.yourweightmatters. org blog for continued daily support and education along this weight loss journey! Patient Resources:     Personal Training/Fitness Classes/Health Coaching     Baldev Jigar and Cabrera Sophiaside @ [email or web address]/ Full fitness center with group fitness and personal training. Discount available as client of [facility]. Health Coaching and Personal Training with [place] at our [facility]- individual weekly coaching with option to add personal training and small group fitness classes targeted at weight loss- [phone] and/or email @ [email or web address]. org  360FIT [place] [email or web address] Group Fitness [phone] and/or email Antoinette Bejarano at [email or web address]. com  [address] [facility] with multiple locations: Aetna (www.Blue Ocean Software), WeeWorld The RockThePost (www.Modastic Groupe. Happy Cloud), Fit Body Bootcamp (www.Bigbasket.combodyEdgar Onlinep.Happy Cloud), ZanAqua (www.Lakeside Speech Language and Learning), The Exercise  (www.exercisecoach.Happy Cloud)     Online Fitness  Fitness  on [employer] in 10 DVD series- www. jaedu46DAT. Happy Cloud  Sit and Be Fit - Chair exercise series Www.sitandbefit. org  Hip Hop Fit with Elijah Morales at www.hiphopfit. net     Apps for on the NTRglobal 7 Minute Workout (orange box with white 7) - free on the go HIIT training ming  Peloton Ming @ www. Wireless Generation     Nutrition Trackers and Tools  LoseIT! And My Fitness Pal apps and on line for tracking nutrition  NOOM - virtual health coaching  FitFoundation (healthy meals on the go) in [facility] @ www. aeaezpnqvocpc9z. Jewels Min MD @ www.Logue TransportdTrading Metrics and Gracy Nunez (keto and low carb plans recommended) @ wwwTelekenex.KSJ, Metabolic Meals @ www. MyMetabolicMeals. com - individual prepared meals to go  Mobvoi, VoyageByMe, International Business Machines, Every Plate, Newslabs- on line meal delivery programs for preparation at home  [place] EmbedStore in [place] for homemade meals to go @ wwwPlanZap. Amnis  Diet Doctor @ www. dietdoctor. Amnis - low carb swaps  Yummly - meal prep and planning ming (www.yummly. com)     Stress Management/Behavior/Mindful Eating  CALM meditation ming (www.Topokine Therapeutics)  Headspace  Am I Hungry? Mindful eating virtual  ming  Www.yourweightmatters. org - Obesity Action Coalition sponsored Blog posts daily  Motivation ming (black box with white \")- daily supportive messages sent to your phone     Books/Video Education/Podcasts  Mindless Eating by Ciarra Dallas  Why We Get Sick by Alexus Whitlock (a book about insulin resistance)  Atomic Habits by Jennie Hurst (a book about taking small steps to promote greater behavior change)   Can't Hurt Me by Lindy Arellano (a book exploring the power of discipline in achieving your goals)  The End of Dieting: How to Live for Life by Dr. Sergio Sierra M.D. or listen to The [facility] Episode 61: Understanding \"Nutritarian\" Eating w/Dr. Sergio Sierra  Your Body in Balance: The World [employer] of Food, Hormones, and Health by Dr. Veronica Chavarria  The Menopause Diet Plan by Bandar Nash and [facility] H[facility] AT [facility]  The Complete Guide to fasting by Dr. Ubaldo Rodriguez, [address] [facility] by Ronni Munguia, Ph.D, R.D.   Weight Loss Surgery Will Not Treat Food Addiction by Michelle Botello Ph.D  The Game Changers- Navendis Documentary on plant based nutrition  Fed Up - documentary about obesity (Free on Utube)  The Truth About Sugar - documentary on sugar (Free on Utube, https://youtu. be/8I1hfmzEC1q)  The Dr. Ying Denise by Dr. Kemar Dent MD  Fitlosophy Fitspiration - journal to better health (found at [employer] in fitness aisle)  What Happened to You?- a look at the impact trauma has on behavior written by Gianluca Feliciano and Dr. Bermeo Lot Again by Christiane Ku - discovering your true self after trauma  Antonino Hayes talk on [employer], The Call to WallCompass  Podcasts: The Exam Room by the Physician's Committee, Nutrition Facts by Dr. Garay Shows    We are here to support you with weight loss, but please remember that you still need your primary care provider for your routine health maintenance.

## 2023-06-26 ENCOUNTER — PATIENT MESSAGE (OUTPATIENT)
Dept: INTERNAL MEDICINE CLINIC | Facility: CLINIC | Age: 73
End: 2023-06-26

## 2023-06-27 RX ORDER — ORAL SEMAGLUTIDE 14 MG/1
14 TABLET ORAL DAILY
Qty: 90 TABLET | Refills: 0 | Status: SHIPPED | OUTPATIENT
Start: 2023-06-27

## 2023-08-30 ENCOUNTER — OFFICE VISIT (OUTPATIENT)
Dept: INTERNAL MEDICINE CLINIC | Facility: CLINIC | Age: 73
End: 2023-08-30
Payer: MEDICARE

## 2023-08-30 VITALS
RESPIRATION RATE: 16 BRPM | HEIGHT: 70 IN | SYSTOLIC BLOOD PRESSURE: 140 MMHG | HEART RATE: 78 BPM | DIASTOLIC BLOOD PRESSURE: 80 MMHG | WEIGHT: 234 LBS | BODY MASS INDEX: 33.5 KG/M2

## 2023-08-30 DIAGNOSIS — Z51.81 THERAPEUTIC DRUG MONITORING: Primary | ICD-10-CM

## 2023-08-30 DIAGNOSIS — E88.81 INSULIN RESISTANCE: ICD-10-CM

## 2023-08-30 DIAGNOSIS — E66.9 OBESITY (BMI 30.0-34.9): ICD-10-CM

## 2023-08-30 DIAGNOSIS — M51.16 LUMBAR DISC HERNIATION WITH RADICULOPATHY: ICD-10-CM

## 2023-08-30 DIAGNOSIS — E78.49 OTHER HYPERLIPIDEMIA: ICD-10-CM

## 2023-08-30 DIAGNOSIS — K76.0 FATTY LIVER: ICD-10-CM

## 2023-08-30 DIAGNOSIS — G47.33 OSA (OBSTRUCTIVE SLEEP APNEA): ICD-10-CM

## 2023-08-30 PROCEDURE — 99214 OFFICE O/P EST MOD 30 MIN: CPT | Performed by: NURSE PRACTITIONER

## 2023-09-24 ENCOUNTER — HOSPITAL ENCOUNTER (OUTPATIENT)
Dept: CT IMAGING | Age: 73
Discharge: HOME | End: 2023-09-24
Attending: INTERNAL MEDICINE

## 2023-09-24 DIAGNOSIS — Z02.89 SELF-REFERRAL: ICD-10-CM

## 2023-10-16 NOTE — TELEPHONE ENCOUNTER
Received orders for PT, placed in [address] bin for signature Bactrim Pregnancy And Lactation Text: This medication is Pregnancy Category D and is known to cause fetal risk.  It is also excreted in breast milk.

## 2023-10-18 RX ORDER — ORAL SEMAGLUTIDE 14 MG/1
1 TABLET ORAL DAILY
Qty: 90 TABLET | Refills: 0 | Status: SHIPPED | OUTPATIENT
Start: 2023-10-18

## 2023-10-18 NOTE — TELEPHONE ENCOUNTER
Requesting   Requested Prescriptions     Pending Prescriptions Disp Refills    RYBELSUS 14 MG Oral Tab [Pharmacy Med Name: HKDHQTBS TAB 14MG] 90 tablet 0     Sig: TAKE 1 TABLET DAILY     LOV: 8/30/23  RTC: 3 months  Filled: 6/27/23 #90 with 0 refills    Future Appointments   Date Time Provider Jodie Lopez   3/15/2024  8:20 AM Gwendolyn Powell MD [facility]

## 2023-11-22 ENCOUNTER — LAB ENCOUNTER (OUTPATIENT)
Dept: LAB | Age: 73
End: 2023-11-22
Attending: PEDIATRICS
Payer: MEDICARE

## 2023-11-22 DIAGNOSIS — N19 RENAL FAILURE: Primary | ICD-10-CM

## 2023-11-22 LAB
CREAT BLD-MCNC: 1.11 MG/DL
EGFRCR SERPLBLD CKD-EPI 2021: 70 ML/MIN/1.73M2 (ref 60–?)

## 2023-11-22 PROCEDURE — 82565 ASSAY OF CREATININE: CPT

## 2023-11-22 PROCEDURE — 36415 COLL VENOUS BLD VENIPUNCTURE: CPT

## 2024-02-10 NOTE — PROGRESS NOTES
Discussed with patient hips look good recent CT consistent also with x-rays severe lumbar arthritis CT incidental abdomen did show some canal stenosis. complains of pain/discomfort

## 2024-02-20 RX ORDER — ROSUVASTATIN CALCIUM 20 MG/1
20 TABLET, COATED ORAL NIGHTLY
Qty: 90 TABLET | Refills: 3 | Status: SHIPPED | OUTPATIENT
Start: 2024-02-20 | End: 2024-02-20

## 2024-02-20 NOTE — TELEPHONE ENCOUNTER
Last VISIT 03-10-23 JL physical    Last CPE 03-10-23    Last REFILL 03-10-23     Last LABS 03-20-23 cmp, cbc, lipid    Future Appointments   Date Time Provider Department Center   3/18/2024  9:40 AM Dwain Caballero MD [facility]       Please Approve or Deny.  Pt requesting refill [facility].

## 2024-02-21 ENCOUNTER — PATIENT MESSAGE (OUTPATIENT)
Dept: INTERNAL MEDICINE CLINIC | Facility: CLINIC | Age: 74
End: 2024-02-21

## 2024-02-21 DIAGNOSIS — Z12.5 SCREENING FOR MALIGNANT NEOPLASM OF PROSTATE: ICD-10-CM

## 2024-02-21 DIAGNOSIS — Z00.00 ROUTINE GENERAL MEDICAL EXAMINATION AT A HEALTH CARE FACILITY: Primary | ICD-10-CM

## 2024-02-21 DIAGNOSIS — E78.49 OTHER HYPERLIPIDEMIA: ICD-10-CM

## 2024-02-21 RX ORDER — ORAL SEMAGLUTIDE 14 MG/1
1 TABLET ORAL DAILY
Qty: 90 TABLET | Refills: 0 | OUTPATIENT
Start: 2024-02-21

## 2024-02-21 RX ORDER — ROSUVASTATIN CALCIUM 20 MG/1
20 TABLET, COATED ORAL NIGHTLY
Qty: 90 TABLET | Refills: 3 | Status: SHIPPED | OUTPATIENT
Start: 2024-02-21

## 2024-02-21 NOTE — TELEPHONE ENCOUNTER
Requesting   Requested Prescriptions     Pending Prescriptions Disp Refills    Semaglutide (RYBELSUS) 14 MG Oral Tab 90 tablet 0     Sig: Take 1 tablet by mouth daily.     ]  LOV: 8/30/23  RTC: 3 months  Filled: 10/18/23 #90 with 0 refills    Future Appointments   Date Time Provider Department Center   3/18/2024  9:40 AM Dwain Caballero MD [facility]     Needs appt

## 2024-02-22 NOTE — TELEPHONE ENCOUNTER
From: Boo Fam  To: Dwain Caballero  Sent: 2/21/2024 10:31 PM CST  Subject: Annual Medicare Visit    Dr. Caballero,  Would it be possible to see a list of labs that are ordered for me to complete before my appointment on 3/18/24?  Thank you.  Regards,  Boo Fam

## 2024-02-27 ENCOUNTER — LAB ENCOUNTER (OUTPATIENT)
Dept: LAB | Age: 74
End: 2024-02-27
Attending: INTERNAL MEDICINE
Payer: MEDICARE

## 2024-02-27 DIAGNOSIS — Z12.5 SCREENING FOR MALIGNANT NEOPLASM OF PROSTATE: ICD-10-CM

## 2024-02-27 DIAGNOSIS — Z00.00 ROUTINE GENERAL MEDICAL EXAMINATION AT A HEALTH CARE FACILITY: ICD-10-CM

## 2024-02-27 DIAGNOSIS — E78.49 OTHER HYPERLIPIDEMIA: ICD-10-CM

## 2024-02-27 LAB
ALBUMIN SERPL-MCNC: 3.8 G/DL (ref 3.4–5)
ALBUMIN/GLOB SERPL: 1 {RATIO} (ref 1–2)
ALP LIVER SERPL-CCNC: 79 U/L
ALT SERPL-CCNC: 30 U/L
ANION GAP SERPL CALC-SCNC: 1 MMOL/L (ref 0–18)
AST SERPL-CCNC: 19 U/L (ref 15–37)
BASOPHILS # BLD AUTO: 0.04 X10(3) UL (ref 0–0.2)
BASOPHILS NFR BLD AUTO: 0.6 %
BILIRUB SERPL-MCNC: 0.6 MG/DL (ref 0.1–2)
BUN BLD-MCNC: 20 MG/DL (ref 9–23)
CALCIUM BLD-MCNC: 9.1 MG/DL (ref 8.5–10.1)
CHLORIDE SERPL-SCNC: 109 MMOL/L (ref 98–112)
CHOLEST SERPL-MCNC: 126 MG/DL (ref ?–200)
CO2 SERPL-SCNC: 29 MMOL/L (ref 21–32)
COMPLEXED PSA SERPL-MCNC: 0.28 NG/ML (ref ?–4)
CREAT BLD-MCNC: 1.2 MG/DL
EGFRCR SERPLBLD CKD-EPI 2021: 63 ML/MIN/1.73M2 (ref 60–?)
EOSINOPHIL # BLD AUTO: 0.58 X10(3) UL (ref 0–0.7)
EOSINOPHIL NFR BLD AUTO: 9.1 %
ERYTHROCYTE [DISTWIDTH] IN BLOOD BY AUTOMATED COUNT: 13.1 %
FASTING PATIENT LIPID ANSWER: YES
FASTING STATUS PATIENT QL REPORTED: YES
GLOBULIN PLAS-MCNC: 3.9 G/DL (ref 2.8–4.4)
GLUCOSE BLD-MCNC: 101 MG/DL (ref 70–99)
HCT VFR BLD AUTO: 44.7 %
HDLC SERPL-MCNC: 46 MG/DL (ref 40–59)
HGB BLD-MCNC: 14.9 G/DL
IMM GRANULOCYTES # BLD AUTO: 0.01 X10(3) UL (ref 0–1)
IMM GRANULOCYTES NFR BLD: 0.2 %
LDLC SERPL CALC-MCNC: 58 MG/DL (ref ?–100)
LYMPHOCYTES # BLD AUTO: 1.85 X10(3) UL (ref 1–4)
LYMPHOCYTES NFR BLD AUTO: 29 %
MCH RBC QN AUTO: 30.7 PG (ref 26–34)
MCHC RBC AUTO-ENTMCNC: 33.3 G/DL (ref 31–37)
MCV RBC AUTO: 92.2 FL
MONOCYTES # BLD AUTO: 0.63 X10(3) UL (ref 0.1–1)
MONOCYTES NFR BLD AUTO: 9.9 %
NEUTROPHILS # BLD AUTO: 3.28 X10 (3) UL (ref 1.5–7.7)
NEUTROPHILS # BLD AUTO: 3.28 X10(3) UL (ref 1.5–7.7)
NEUTROPHILS NFR BLD AUTO: 51.2 %
NONHDLC SERPL-MCNC: 80 MG/DL (ref ?–130)
OSMOLALITY SERPL CALC.SUM OF ELEC: 291 MOSM/KG (ref 275–295)
PLATELET # BLD AUTO: 195 10(3)UL (ref 150–450)
POTASSIUM SERPL-SCNC: 4.3 MMOL/L (ref 3.5–5.1)
PROT SERPL-MCNC: 7.7 G/DL (ref 6.4–8.2)
RBC # BLD AUTO: 4.85 X10(6)UL
SODIUM SERPL-SCNC: 139 MMOL/L (ref 136–145)
TRIGL SERPL-MCNC: 121 MG/DL (ref 30–149)
TSI SER-ACNC: 1.4 MIU/ML (ref 0.36–3.74)
VLDLC SERPL CALC-MCNC: 18 MG/DL (ref 0–30)
WBC # BLD AUTO: 6.4 X10(3) UL (ref 4–11)

## 2024-02-27 PROCEDURE — 85025 COMPLETE CBC W/AUTO DIFF WBC: CPT

## 2024-02-27 PROCEDURE — 80053 COMPREHEN METABOLIC PANEL: CPT

## 2024-02-27 PROCEDURE — 84443 ASSAY THYROID STIM HORMONE: CPT

## 2024-02-27 PROCEDURE — 80061 LIPID PANEL: CPT

## 2024-02-27 PROCEDURE — 36415 COLL VENOUS BLD VENIPUNCTURE: CPT

## 2024-03-18 ENCOUNTER — OFFICE VISIT (OUTPATIENT)
Dept: INTERNAL MEDICINE CLINIC | Facility: CLINIC | Age: 74
End: 2024-03-18
Payer: MEDICARE

## 2024-03-18 VITALS
HEIGHT: 70 IN | OXYGEN SATURATION: 97 % | BODY MASS INDEX: 34.93 KG/M2 | WEIGHT: 244 LBS | SYSTOLIC BLOOD PRESSURE: 128 MMHG | TEMPERATURE: 97 F | HEART RATE: 74 BPM | DIASTOLIC BLOOD PRESSURE: 80 MMHG

## 2024-03-18 DIAGNOSIS — Z00.00 ROUTINE GENERAL MEDICAL EXAMINATION AT A HEALTH CARE FACILITY: Primary | ICD-10-CM

## 2024-03-18 DIAGNOSIS — R93.1 AGATSTON CAC SCORE, <100: ICD-10-CM

## 2024-03-18 DIAGNOSIS — M47.812 CERVICAL SPINE ARTHRITIS: ICD-10-CM

## 2024-03-18 DIAGNOSIS — K44.9 HIATAL HERNIA: ICD-10-CM

## 2024-03-18 DIAGNOSIS — C68.9 UROTHELIAL CANCER (HCC): ICD-10-CM

## 2024-03-18 DIAGNOSIS — M47.812 FACET ARTHRITIS OF CERVICAL REGION: ICD-10-CM

## 2024-03-18 DIAGNOSIS — Z83.3 FH: DIABETES MELLITUS: ICD-10-CM

## 2024-03-18 DIAGNOSIS — I65.21 CAROTID STENOSIS, RIGHT: ICD-10-CM

## 2024-03-18 DIAGNOSIS — I65.23 ATHEROSCLEROSIS OF BOTH CAROTID ARTERIES: ICD-10-CM

## 2024-03-18 DIAGNOSIS — E78.49 OTHER HYPERLIPIDEMIA: ICD-10-CM

## 2024-03-18 PROBLEM — R10.9 FLANK PAIN: Status: RESOLVED | Noted: 2020-12-18 | Resolved: 2024-03-18

## 2024-03-18 PROBLEM — R45.86 MOOD CHANGE: Status: RESOLVED | Noted: 2020-07-15 | Resolved: 2024-03-18

## 2024-03-18 PROBLEM — M51.16 LUMBAR DISC HERNIATION WITH RADICULOPATHY: Status: RESOLVED | Noted: 2021-11-01 | Resolved: 2024-03-18

## 2024-03-18 RX ORDER — ROSUVASTATIN CALCIUM 20 MG/1
20 TABLET, COATED ORAL NIGHTLY
Qty: 90 TABLET | Refills: 3 | Status: SHIPPED | OUTPATIENT
Start: 2024-03-18

## 2024-03-22 NOTE — PROGRESS NOTES
Subjective:   Patient ID: Boo Fam is a 74 year old male.    /80   Pulse 74   Temp 97.3 °F (36.3 °C)   Ht 5' 10\" (1.778 m)   Wt 244 lb (110.7 kg)   SpO2 97%   BMI 35.01 kg/m²     HPI  Here for awv, hyperchol, follows at [facility] with [facility]rology for cancer, occ neck discomfort and carotid dx  History/Other:   Review of Systems   Constitutional:  Negative for fever.   HENT:  Negative for congestion.    Eyes:  Negative for visual disturbance.   Respiratory:  Negative for shortness of breath.    Cardiovascular:  Negative for chest pain.   Gastrointestinal:  Negative for abdominal pain.   Endocrine: Negative for polyuria.   Genitourinary:  Negative for dysuria.   Musculoskeletal:  Negative for arthralgias.   Skin:  Negative for rash.   Neurological:  Negative for headaches.   Psychiatric/Behavioral:  Negative for confusion.      Current Outpatient Medications   Medication Sig Dispense Refill    rosuvastatin 20 MG Oral Tab Take 1 tablet (20 mg total) by mouth nightly. 90 tablet 3    Omega-3 Fatty Acids (FISH OIL) 1200 MG Oral Cap       Multiple Vitamins-Minerals (MULTI-VITAMIN/MINERALS) Oral Tab Take 1 tablet by mouth daily.      cetirizine 10 MG Oral Tab Take 1 tablet (10 mg total) by mouth daily.      Coenzyme Q10 (COQ-10 OR) Take 1 capsule by mouth daily.        Cholecalciferol (VITAMIN D OR) Take 1 capsule by mouth daily.        psyllium (METAMUCIL SMOOTH TEXTURE) 28 % Oral Powd Pack Take 1 packet by mouth daily.       Allergies:  Allergies   Allergen Reactions    Gramineae Pollens Runny nose, Coughing and OTHER (SEE COMMENTS)     Also trees    Grass Runny nose and Coughing     Also TREE    Pollen Runny nose and Coughing       Objective:   Physical Exam  Constitutional:       Appearance: Normal appearance.   HENT:      Head: Normocephalic and atraumatic.      Right Ear: Tympanic membrane normal.      Left Ear: Tympanic membrane normal.   Eyes:      Pupils: Pupils are equal, round, and reactive to  light.   Cardiovascular:      Rate and Rhythm: Regular rhythm.      Heart sounds: No murmur heard.  Pulmonary:      Breath sounds: Normal breath sounds.   Abdominal:      Palpations: Abdomen is soft.   Musculoskeletal:         General: No swelling.      Cervical back: Neck supple.   Skin:     General: Skin is warm.   Neurological:      General: No focal deficit present.      Mental Status: He is alert.   Psychiatric:         Mood and Affect: Mood normal.         Assessment & Plan:   1. Routine general medical examination at a health care facility -see awv form filled out for details   2. Urothelial cancer (HCC) -per [facility]   3. Other hyperlipidemia -cont meds   4. Carotid stenosis, right -cont statin   5. Atherosclerosis of both carotid arteries -cont statin   6. Agatston CAC score, <100 -follow on meds   7. FH: diabetes mellitus -follow labs   8. Facet arthritis of cervical region -neck ok, follow   9. Cervical spine arthritis -follow   10. Hiatal hernia -follow  Gets bp checked at specialists, fu in one year here for awv       No orders of the defined types were placed in this encounter.      Meds This Visit:  Requested Prescriptions     Signed Prescriptions Disp Refills    rosuvastatin 20 MG Oral Tab 90 tablet 3     Sig: Take 1 tablet (20 mg total) by mouth nightly.       Imaging & Referrals:  None

## 2024-04-15 ENCOUNTER — LAB ENCOUNTER (OUTPATIENT)
Dept: LAB | Facility: HOSPITAL | Age: 74
End: 2024-04-15
Attending: PEDIATRICS
Payer: MEDICARE

## 2024-04-15 ENCOUNTER — LAB ENCOUNTER (OUTPATIENT)
Dept: LAB | Age: 74
End: 2024-04-15
Attending: PEDIATRICS
Payer: MEDICARE

## 2024-04-15 DIAGNOSIS — R31.9 HEMATURIA: Primary | ICD-10-CM

## 2024-04-15 LAB
BILIRUB UR QL STRIP.AUTO: NEGATIVE
CLARITY UR REFRACT.AUTO: CLEAR
GLUCOSE UR STRIP.AUTO-MCNC: NORMAL MG/DL
KETONES UR STRIP.AUTO-MCNC: NEGATIVE MG/DL
LEUKOCYTE ESTERASE UR QL STRIP.AUTO: 25
NITRITE UR QL STRIP.AUTO: NEGATIVE
PH UR STRIP.AUTO: 6 [PH] (ref 5–8)
PROT UR STRIP.AUTO-MCNC: NEGATIVE MG/DL
SP GR UR STRIP.AUTO: 1.02 (ref 1–1.03)
UROBILINOGEN UR STRIP.AUTO-MCNC: NORMAL MG/DL

## 2024-04-15 PROCEDURE — 87086 URINE CULTURE/COLONY COUNT: CPT

## 2024-04-15 PROCEDURE — 81001 URINALYSIS AUTO W/SCOPE: CPT

## 2024-06-17 ENCOUNTER — LAB ENCOUNTER (OUTPATIENT)
Dept: LAB | Age: 74
End: 2024-06-17
Attending: PEDIATRICS

## 2024-06-17 DIAGNOSIS — C66.1: Primary | ICD-10-CM

## 2024-06-17 LAB
BILIRUB UR QL STRIP.AUTO: NEGATIVE
CLARITY UR REFRACT.AUTO: CLEAR
GLUCOSE UR STRIP.AUTO-MCNC: NORMAL MG/DL
KETONES UR STRIP.AUTO-MCNC: NEGATIVE MG/DL
LEUKOCYTE ESTERASE UR QL STRIP.AUTO: 75
NITRITE UR QL STRIP.AUTO: NEGATIVE
PH UR STRIP.AUTO: 6 [PH] (ref 5–8)
PROT UR STRIP.AUTO-MCNC: NEGATIVE MG/DL
RBC UR QL AUTO: NEGATIVE
SP GR UR STRIP.AUTO: 1.01 (ref 1–1.03)
UROBILINOGEN UR STRIP.AUTO-MCNC: NORMAL MG/DL

## 2024-06-17 PROCEDURE — 81001 URINALYSIS AUTO W/SCOPE: CPT

## 2024-09-16 ENCOUNTER — OFFICE VISIT (OUTPATIENT)
Dept: INTERNAL MEDICINE CLINIC | Facility: CLINIC | Age: 74
End: 2024-09-16
Payer: MEDICARE

## 2024-09-16 VITALS
OXYGEN SATURATION: 94 % | SYSTOLIC BLOOD PRESSURE: 132 MMHG | WEIGHT: 248.38 LBS | HEIGHT: 70.08 IN | HEART RATE: 66 BPM | TEMPERATURE: 97 F | DIASTOLIC BLOOD PRESSURE: 78 MMHG | RESPIRATION RATE: 18 BRPM | BODY MASS INDEX: 35.56 KG/M2

## 2024-09-16 DIAGNOSIS — E78.49 OTHER HYPERLIPIDEMIA: ICD-10-CM

## 2024-09-16 DIAGNOSIS — R03.0 ELEVATED BP WITHOUT DIAGNOSIS OF HYPERTENSION: Primary | ICD-10-CM

## 2024-09-16 DIAGNOSIS — C68.9 UROTHELIAL CANCER (HCC): ICD-10-CM

## 2024-09-16 PROCEDURE — 99213 OFFICE O/P EST LOW 20 MIN: CPT | Performed by: INTERNAL MEDICINE

## 2024-12-05 ENCOUNTER — PATIENT MESSAGE (OUTPATIENT)
Dept: INTERNAL MEDICINE CLINIC | Facility: CLINIC | Age: 74
End: 2024-12-05

## 2024-12-08 NOTE — TELEPHONE ENCOUNTER
Health Maintenance   Topic Date Due    COVID-19 Vaccine (3 - 2024-25 season) 09/01/2024    Influenza Vaccine (1) 10/01/2024    Colorectal Cancer Screening  12/05/2024    Annual Physical  03/18/2025    Annual Depression Screening  Completed    Fall Risk Screening (Annual)  Completed    Pneumococcal Vaccine: 65+ Years  Completed    Zoster Vaccines  Completed     ASSESSMENT:    Colon cancer screening    PLAN:    Colonoscopy. The procedure was reviewed with the patient. The patient is aware of the risks, including bleeding, perforation and anesthetic complications. The limitations of the procedure were reviewed. The patient agrees and all questions were answered.    Electronically signed by Jt Doss MD at 12/05/2024 1:10 PM CST   PLAN:    In light of his age, he does not need another colonoscopy.    Electronically signed by Jt Doss MD at 12/05/2024 1:08 PM CST

## 2024-12-17 NOTE — PROGRESS NOTES
Subjective:   Patient ID: Boo Fam is a 74 year old male.    /78 (BP Location: Left arm, Patient Position: Sitting, Cuff Size: large)   Pulse 66   Temp 97.4 °F (36.3 °C) (Temporal)   Resp 18   Ht 5' 10.08\" (1.78 m)   Wt 248 lb 6.4 oz (112.7 kg)   SpO2 94%   BMI 35.56 kg/m²     HPI  Here as had a few episodes of elevated bps at procedures, better today, hyperchol and urethral carcinoma  History/Other:   Review of Systems   Constitutional:  Negative for fever.   Respiratory:  Negative for shortness of breath.    Cardiovascular:  Negative for chest pain.   Endocrine: Negative for polyuria.   Neurological:  Negative for headaches.     Current Outpatient Medications   Medication Sig Dispense Refill    rosuvastatin 20 MG Oral Tab Take 1 tablet (20 mg total) by mouth nightly. 90 tablet 3    Omega-3 Fatty Acids (FISH OIL) 1200 MG Oral Cap       Multiple Vitamins-Minerals (MULTI-VITAMIN/MINERALS) Oral Tab Take 1 tablet by mouth daily.      cetirizine 10 MG Oral Tab Take 1 tablet (10 mg total) by mouth daily.      Coenzyme Q10 (COQ-10 OR) Take 1 capsule by mouth daily.        Cholecalciferol (VITAMIN D OR) Take 1 capsule by mouth daily.        psyllium (METAMUCIL SMOOTH TEXTURE) 28 % Oral Powd Pack Take 1 packet by mouth daily.       Allergies:  Allergies   Allergen Reactions    Gramineae Pollens Runny nose, Coughing and OTHER (SEE COMMENTS)     Also trees    Grass Runny nose and Coughing     Also TREE    Pollen Runny nose and Coughing       Objective:   Physical Exam  Constitutional:       Appearance: Normal appearance.   Cardiovascular:      Rate and Rhythm: Regular rhythm.      Heart sounds: Normal heart sounds.   Pulmonary:      Breath sounds: Normal breath sounds.   Skin:     General: Skin is warm.   Neurological:      Mental Status: He is alert and oriented to person, place, and time.       Assessment & Plan:   1. Elevated BP without diagnosis of hypertension -bp ok, today, follow, if high  again call, otherwise fu for annual awv   2. Urothelial cancer (HCC) -follows at [facility]   3. Other hyperlipidemia -cont statin       No orders of the defined types were placed in this encounter.      Meds This Visit:  Requested Prescriptions      No prescriptions requested or ordered in this encounter       Imaging & Referrals:  None     Quality 431: Preventive Care And Screening: Unhealthy Alcohol Use - Screening: Patient not identified as an unhealthy alcohol user when screened for unhealthy alcohol use using a systematic screening method Quality 226: Preventive Care And Screening: Tobacco Use: Screening And Cessation Intervention: Patient screened for tobacco use and is an ex/non-smoker Detail Level: Detailed Quality 130: Documentation Of Current Medications In The Medical Record: Current Medications Documented

## 2024-12-26 NOTE — TELEPHONE ENCOUNTER
From: Kahlli Guillen  To: RUBÉN Portillo  Sent: 11/25/2019 3:17 PM CST  Subject: Non-Urgent Medical Question    Mariano Been,  I continue to do well with NO recurrence of the left-sided radicular pain.  The incision has healed well and my back di PAST SURGICAL HISTORY:  No significant past surgical history

## 2025-01-06 DIAGNOSIS — R33.9 URINARY RETENTION: ICD-10-CM

## 2025-01-06 DIAGNOSIS — R82.90 URINE FINDING: ICD-10-CM

## 2025-01-06 DIAGNOSIS — D49.59 URETERAL NEOPLASM: Primary | ICD-10-CM

## 2025-01-06 NOTE — TELEPHONE ENCOUNTER
Received Written Orders BCG :   Faxed to [facility] (Fax# [phone])

## 2025-01-07 RX ORDER — CEPHALEXIN 500 MG/1
500 CAPSULE ORAL AS DIRECTED
Qty: 6 CAPSULE | Refills: 0 | Status: SHIPPED | OUTPATIENT
Start: 2025-01-07

## 2025-01-07 NOTE — TELEPHONE ENCOUNTER
Faxed with fax confirmation Updated Written BCG Orders 1/7/2025 (Included coude tip).  Fax# [phone]. Order with cover sheet (=2 pages) refaxed to [phone] with fax confirmation.     Dept. Will contact patient to schedule - they should be contacting patient in the next few days after verification orders, billing, ect...).

## 2025-01-08 ENCOUNTER — LAB ENCOUNTER (OUTPATIENT)
Dept: LAB | Age: 75
End: 2025-01-08
Attending: PHYSICIAN ASSISTANT
Payer: MEDICARE

## 2025-01-08 DIAGNOSIS — R33.9 URINARY RETENTION: ICD-10-CM

## 2025-01-08 DIAGNOSIS — D49.59 URETERAL NEOPLASM: ICD-10-CM

## 2025-01-08 DIAGNOSIS — R82.90 URINE FINDING: ICD-10-CM

## 2025-01-08 LAB
BILIRUB UR QL STRIP.AUTO: NEGATIVE
CLARITY UR REFRACT.AUTO: CLEAR
COLOR UR AUTO: YELLOW
GLUCOSE UR STRIP.AUTO-MCNC: NEGATIVE MG/DL
KETONES UR STRIP.AUTO-MCNC: NEGATIVE MG/DL
NITRITE UR QL STRIP.AUTO: NEGATIVE
PH UR STRIP.AUTO: 6 [PH] (ref 5–8)
SP GR UR STRIP.AUTO: 1.02 (ref 1–1.03)
UROBILINOGEN UR STRIP.AUTO-MCNC: 0.2 MG/DL

## 2025-01-08 PROCEDURE — 81001 URINALYSIS AUTO W/SCOPE: CPT

## 2025-01-08 PROCEDURE — 87086 URINE CULTURE/COLONY COUNT: CPT

## 2025-01-08 PROCEDURE — 81015 MICROSCOPIC EXAM OF URINE: CPT

## 2025-01-10 ENCOUNTER — NURSE ONLY (OUTPATIENT)
Age: 75
End: 2025-01-10
Attending: PHYSICIAN ASSISTANT
Payer: MEDICARE

## 2025-01-10 DIAGNOSIS — D49.59 URETERAL NEOPLASM: Primary | ICD-10-CM

## 2025-01-10 RX ORDER — LIDOCAINE HYDROCHLORIDE 20 MG/ML
10 JELLY TOPICAL ONCE
Start: 2025-01-17 | End: 2025-01-17

## 2025-01-10 RX ORDER — LIDOCAINE HYDROCHLORIDE 20 MG/ML
10 JELLY TOPICAL ONCE
Status: DISCONTINUED | OUTPATIENT
Start: 2025-01-10 | End: 2025-01-10

## 2025-01-10 NOTE — PROGRESS NOTES
Education Record    Learner:  Patient    Disease / Diagnosis: Urothelial CA    Barriers / Limitations:  None   Comments:    Method:  Brief focused   Comments:    General Topics:  Medication, Procedure, Side effects and symptom management, and Plan of care reviewed   Comments:    Outcome:  Shows understanding   Comments:    BCG #1 of 6. Tolerated well without issue. Future appointments made. Aware to take keflex 4 hours later. Patient goes home to lay in Trendelenburg for 1 hour due to location of disease.

## 2025-01-15 ENCOUNTER — LAB ENCOUNTER (OUTPATIENT)
Dept: LAB | Age: 75
End: 2025-01-15
Attending: PHYSICIAN ASSISTANT
Payer: MEDICARE

## 2025-01-15 ENCOUNTER — MOBILE ENCOUNTER (OUTPATIENT)
Dept: SURGERY | Facility: CLINIC | Age: 75
End: 2025-01-15

## 2025-01-15 DIAGNOSIS — D49.59 URETERAL NEOPLASM: ICD-10-CM

## 2025-01-15 DIAGNOSIS — D49.59 URETERAL NEOPLASM: Primary | ICD-10-CM

## 2025-01-15 LAB
BILIRUB UR QL STRIP.AUTO: NEGATIVE
CLARITY UR REFRACT.AUTO: CLEAR
GLUCOSE UR STRIP.AUTO-MCNC: NORMAL MG/DL
KETONES UR STRIP.AUTO-MCNC: NEGATIVE MG/DL
LEUKOCYTE ESTERASE UR QL STRIP.AUTO: 25
NITRITE UR QL STRIP.AUTO: NEGATIVE
PH UR STRIP.AUTO: 5.5 [PH] (ref 5–8)
PROT UR STRIP.AUTO-MCNC: NEGATIVE MG/DL
RBC UR QL AUTO: NEGATIVE
SP GR UR STRIP.AUTO: 1.02 (ref 1–1.03)
UROBILINOGEN UR STRIP.AUTO-MCNC: NORMAL MG/DL

## 2025-01-15 PROCEDURE — 81001 URINALYSIS AUTO W/SCOPE: CPT

## 2025-01-15 PROCEDURE — 87086 URINE CULTURE/COLONY COUNT: CPT

## 2025-01-17 ENCOUNTER — NURSE ONLY (OUTPATIENT)
Age: 75
End: 2025-01-17
Attending: PHYSICIAN ASSISTANT
Payer: MEDICARE

## 2025-01-17 DIAGNOSIS — D49.59 URETERAL NEOPLASM: Primary | ICD-10-CM

## 2025-01-17 RX ORDER — LIDOCAINE HYDROCHLORIDE 20 MG/ML
10 JELLY TOPICAL ONCE
Status: COMPLETED | OUTPATIENT
Start: 2025-01-17 | End: 2025-01-17

## 2025-01-17 RX ORDER — LIDOCAINE HYDROCHLORIDE 20 MG/ML
10 JELLY TOPICAL ONCE
Start: 2025-01-24 | End: 2025-01-24

## 2025-01-17 RX ADMIN — LIDOCAINE HYDROCHLORIDE 10 ML: 20 JELLY TOPICAL at 08:21:00

## 2025-01-17 NOTE — PROGRESS NOTES
Education Record    Learner:  Patient    Disease / Diagnosis: Urothelial CA    Barriers / Limitations:  None   Comments:    Method:  Discussion   Comments:    General Topics:  Medication, Side effects and symptom management, Plan of care reviewed, and Fall risk and prevention   Comments:    Outcome:  Shows understanding   Comments:    UA and culture reviewed. Pt denies UTI symptoms. Urinary catheter inserted using sterile technique, urine return noted, BCG instilled, catheter removed. Pt tolerated well. Pt instructed to hold x2 hrs and take PO keflex in 4 hrs - he verbalized understanding. Discharged ambulatory in stable condition.

## 2025-01-21 ENCOUNTER — LAB ENCOUNTER (OUTPATIENT)
Dept: LAB | Age: 75
End: 2025-01-21
Attending: PHYSICIAN ASSISTANT
Payer: MEDICARE

## 2025-01-21 DIAGNOSIS — D49.59 URETERAL NEOPLASM: ICD-10-CM

## 2025-01-21 LAB
BILIRUB UR QL STRIP.AUTO: NEGATIVE
CLARITY UR REFRACT.AUTO: CLEAR
GLUCOSE UR STRIP.AUTO-MCNC: NORMAL MG/DL
KETONES UR STRIP.AUTO-MCNC: NEGATIVE MG/DL
LEUKOCYTE ESTERASE UR QL STRIP.AUTO: 75
NITRITE UR QL STRIP.AUTO: NEGATIVE
PH UR STRIP.AUTO: 5 [PH] (ref 5–8)
PROT UR STRIP.AUTO-MCNC: NEGATIVE MG/DL
RBC UR QL AUTO: NEGATIVE
SP GR UR STRIP.AUTO: 1.02 (ref 1–1.03)
UROBILINOGEN UR STRIP.AUTO-MCNC: NORMAL MG/DL

## 2025-01-21 PROCEDURE — 87086 URINE CULTURE/COLONY COUNT: CPT

## 2025-01-21 PROCEDURE — 81001 URINALYSIS AUTO W/SCOPE: CPT

## 2025-01-24 ENCOUNTER — NURSE ONLY (OUTPATIENT)
Age: 75
End: 2025-01-24
Attending: PHYSICIAN ASSISTANT
Payer: MEDICARE

## 2025-01-24 VITALS
DIASTOLIC BLOOD PRESSURE: 87 MMHG | BODY MASS INDEX: 36.1 KG/M2 | HEIGHT: 70.08 IN | OXYGEN SATURATION: 92 % | TEMPERATURE: 98 F | SYSTOLIC BLOOD PRESSURE: 137 MMHG | RESPIRATION RATE: 18 BRPM | WEIGHT: 252.19 LBS | HEART RATE: 75 BPM

## 2025-01-24 DIAGNOSIS — D49.59 URETERAL NEOPLASM: Primary | ICD-10-CM

## 2025-01-24 RX ORDER — LIDOCAINE HYDROCHLORIDE 20 MG/ML
10 JELLY TOPICAL ONCE
Start: 2025-01-31 | End: 2025-01-31

## 2025-01-24 RX ORDER — LIDOCAINE HYDROCHLORIDE 20 MG/ML
10 JELLY TOPICAL ONCE
Status: COMPLETED | OUTPATIENT
Start: 2025-01-24 | End: 2025-01-24

## 2025-01-24 RX ADMIN — LIDOCAINE HYDROCHLORIDE 10 ML: 20 JELLY TOPICAL at 08:15:00

## 2025-01-24 NOTE — PROGRESS NOTES
Education Record    Learner:  Patient    Disease / Diagnosis: ureteral neoplasm    Barriers / Limitations:  None   Comments:    Method:  Brief focused   Comments:    General Topics:  Plan of care reviewed   Comments:    Outcome:  Shows understanding   Comments:    BCG bladder instillation #3/6.

## 2025-01-28 ENCOUNTER — LAB ENCOUNTER (OUTPATIENT)
Dept: LAB | Age: 75
End: 2025-01-28
Attending: PHYSICIAN ASSISTANT
Payer: MEDICARE

## 2025-01-28 DIAGNOSIS — D49.59 URETERAL NEOPLASM: ICD-10-CM

## 2025-01-28 LAB
ANION GAP SERPL CALC-SCNC: 9 MMOL/L (ref 0–18)
BASOPHILS # BLD AUTO: 0.08 X10(3) UL (ref 0–0.2)
BASOPHILS NFR BLD AUTO: 0.9 %
BILIRUB UR QL STRIP.AUTO: NEGATIVE
BUN BLD-MCNC: 27 MG/DL (ref 9–23)
CALCIUM BLD-MCNC: 9.4 MG/DL (ref 8.7–10.6)
CHLORIDE SERPL-SCNC: 103 MMOL/L (ref 98–112)
CLARITY UR REFRACT.AUTO: CLEAR
CO2 SERPL-SCNC: 28 MMOL/L (ref 21–32)
CREAT BLD-MCNC: 1.39 MG/DL
EGFRCR SERPLBLD CKD-EPI 2021: 53 ML/MIN/1.73M2 (ref 60–?)
EOSINOPHIL # BLD AUTO: 0.25 X10(3) UL (ref 0–0.7)
EOSINOPHIL NFR BLD AUTO: 2.9 %
ERYTHROCYTE [DISTWIDTH] IN BLOOD BY AUTOMATED COUNT: 12.5 %
FASTING STATUS PATIENT QL REPORTED: NO
GLUCOSE BLD-MCNC: 97 MG/DL (ref 70–99)
GLUCOSE UR STRIP.AUTO-MCNC: NORMAL MG/DL
HCT VFR BLD AUTO: 46.8 %
HGB BLD-MCNC: 16.2 G/DL
IMM GRANULOCYTES # BLD AUTO: 0.03 X10(3) UL (ref 0–1)
IMM GRANULOCYTES NFR BLD: 0.4 %
KETONES UR STRIP.AUTO-MCNC: NEGATIVE MG/DL
LEUKOCYTE ESTERASE UR QL STRIP.AUTO: 75
LYMPHOCYTES # BLD AUTO: 2.53 X10(3) UL (ref 1–4)
LYMPHOCYTES NFR BLD AUTO: 29.8 %
MCH RBC QN AUTO: 32 PG (ref 26–34)
MCHC RBC AUTO-ENTMCNC: 34.6 G/DL (ref 31–37)
MCV RBC AUTO: 92.5 FL
MONOCYTES # BLD AUTO: 0.81 X10(3) UL (ref 0.1–1)
MONOCYTES NFR BLD AUTO: 9.5 %
NEUTROPHILS # BLD AUTO: 4.8 X10 (3) UL (ref 1.5–7.7)
NEUTROPHILS # BLD AUTO: 4.8 X10(3) UL (ref 1.5–7.7)
NEUTROPHILS NFR BLD AUTO: 56.5 %
NITRITE UR QL STRIP.AUTO: NEGATIVE
OSMOLALITY SERPL CALC.SUM OF ELEC: 295 MOSM/KG (ref 275–295)
PH UR STRIP.AUTO: 6 [PH] (ref 5–8)
PLATELET # BLD AUTO: 272 10(3)UL (ref 150–450)
POTASSIUM SERPL-SCNC: 4.6 MMOL/L (ref 3.5–5.1)
PROT UR STRIP.AUTO-MCNC: NEGATIVE MG/DL
RBC # BLD AUTO: 5.06 X10(6)UL
RBC UR QL AUTO: NEGATIVE
SODIUM SERPL-SCNC: 140 MMOL/L (ref 136–145)
SP GR UR STRIP.AUTO: 1.01 (ref 1–1.03)
UROBILINOGEN UR STRIP.AUTO-MCNC: NORMAL MG/DL
WBC # BLD AUTO: 8.5 X10(3) UL (ref 4–11)

## 2025-01-28 PROCEDURE — 87086 URINE CULTURE/COLONY COUNT: CPT

## 2025-01-28 PROCEDURE — 36415 COLL VENOUS BLD VENIPUNCTURE: CPT

## 2025-01-28 PROCEDURE — 85025 COMPLETE CBC W/AUTO DIFF WBC: CPT

## 2025-01-28 PROCEDURE — 81001 URINALYSIS AUTO W/SCOPE: CPT

## 2025-01-28 PROCEDURE — 80048 BASIC METABOLIC PNL TOTAL CA: CPT

## 2025-01-31 ENCOUNTER — NURSE ONLY (OUTPATIENT)
Age: 75
End: 2025-01-31
Attending: PHYSICIAN ASSISTANT
Payer: MEDICARE

## 2025-01-31 DIAGNOSIS — D49.59 URETERAL NEOPLASM: Primary | ICD-10-CM

## 2025-01-31 RX ORDER — LIDOCAINE HYDROCHLORIDE 20 MG/ML
10 JELLY TOPICAL ONCE
Status: COMPLETED | OUTPATIENT
Start: 2025-01-31 | End: 2025-01-31

## 2025-01-31 RX ORDER — LIDOCAINE HYDROCHLORIDE 20 MG/ML
10 JELLY TOPICAL ONCE
Start: 2025-02-07 | End: 2025-02-07

## 2025-01-31 RX ADMIN — LIDOCAINE HYDROCHLORIDE 10 ML: 20 JELLY TOPICAL at 08:24:00

## 2025-01-31 NOTE — PROGRESS NOTES
Education Record    Learner:  Patient    Disease / Diagnosis: Ureteral Neoplasm    Barriers / Limitations:  None   Comments:    Method:  Brief focused   Comments:    General Topics:  Plan of care reviewed   Comments:    Outcome:  Shows understanding   Comments:    BCG instilled using sterile technique. Patient states understanding for holding his urine for 2 hours and well as antibiotic use.

## 2025-02-04 ENCOUNTER — LAB ENCOUNTER (OUTPATIENT)
Dept: LAB | Age: 75
End: 2025-02-04
Attending: PHYSICIAN ASSISTANT
Payer: MEDICARE

## 2025-02-04 DIAGNOSIS — D49.59 URETERAL NEOPLASM: ICD-10-CM

## 2025-02-04 LAB
ANION GAP SERPL CALC-SCNC: 8 MMOL/L (ref 0–18)
BILIRUB UR QL STRIP.AUTO: NEGATIVE
BUN BLD-MCNC: 29 MG/DL (ref 9–23)
CALCIUM BLD-MCNC: 9.5 MG/DL (ref 8.7–10.6)
CHLORIDE SERPL-SCNC: 103 MMOL/L (ref 98–112)
CLARITY UR REFRACT.AUTO: CLEAR
CO2 SERPL-SCNC: 27 MMOL/L (ref 21–32)
CREAT BLD-MCNC: 1.43 MG/DL
EGFRCR SERPLBLD CKD-EPI 2021: 51 ML/MIN/1.73M2 (ref 60–?)
FASTING STATUS PATIENT QL REPORTED: NO
GLUCOSE BLD-MCNC: 91 MG/DL (ref 70–99)
GLUCOSE UR STRIP.AUTO-MCNC: NORMAL MG/DL
KETONES UR STRIP.AUTO-MCNC: NEGATIVE MG/DL
LEUKOCYTE ESTERASE UR QL STRIP.AUTO: 250
NITRITE UR QL STRIP.AUTO: NEGATIVE
OSMOLALITY SERPL CALC.SUM OF ELEC: 291 MOSM/KG (ref 275–295)
PH UR STRIP.AUTO: 6 [PH] (ref 5–8)
POTASSIUM SERPL-SCNC: 4.2 MMOL/L (ref 3.5–5.1)
PROT UR STRIP.AUTO-MCNC: NEGATIVE MG/DL
RBC UR QL AUTO: NEGATIVE
SODIUM SERPL-SCNC: 138 MMOL/L (ref 136–145)
SP GR UR STRIP.AUTO: 1.01 (ref 1–1.03)
UROBILINOGEN UR STRIP.AUTO-MCNC: NORMAL MG/DL

## 2025-02-04 PROCEDURE — 80048 BASIC METABOLIC PNL TOTAL CA: CPT

## 2025-02-04 PROCEDURE — 81001 URINALYSIS AUTO W/SCOPE: CPT

## 2025-02-04 PROCEDURE — 87086 URINE CULTURE/COLONY COUNT: CPT

## 2025-02-04 PROCEDURE — 36415 COLL VENOUS BLD VENIPUNCTURE: CPT

## 2025-02-07 ENCOUNTER — NURSE ONLY (OUTPATIENT)
Age: 75
End: 2025-02-07
Attending: PHYSICIAN ASSISTANT
Payer: MEDICARE

## 2025-02-07 DIAGNOSIS — D49.59 URETERAL NEOPLASM: Primary | ICD-10-CM

## 2025-02-07 RX ORDER — LIDOCAINE HYDROCHLORIDE 20 MG/ML
10 JELLY TOPICAL ONCE
Start: 2025-02-14 | End: 2025-02-14

## 2025-02-07 RX ORDER — LIDOCAINE HYDROCHLORIDE 20 MG/ML
10 JELLY TOPICAL ONCE
Status: COMPLETED | OUTPATIENT
Start: 2025-02-07 | End: 2025-02-07

## 2025-02-07 RX ADMIN — LIDOCAINE HYDROCHLORIDE 10 ML: 20 JELLY TOPICAL at 08:32:00

## 2025-02-07 NOTE — PROGRESS NOTES
Education Record    Learner:  Patient    Disease / Diagnosis: Urothelial cancer    Barriers / Limitations:  None   Comments:    Method:  Discussion   Comments:    General Topics:  Medication, Side effects and symptom management, Plan of care reviewed, and Fall risk and prevention   Comments:    Outcome:  Shows understanding   Comments:    UA and culture results reviewed. Pt denies UTI symptoms.     Urinary catheter inserted using sterile technique, urine return noted, BCG (half dose 25mg) instilled, and catheter removed. Pt tolerated well. Pt instructed to hold x2 hrs and take PO abx in 4 hrs - he verbalized understanding. Discharged ambulatory in stable condition.

## 2025-02-11 ENCOUNTER — LAB ENCOUNTER (OUTPATIENT)
Dept: LAB | Age: 75
End: 2025-02-11
Attending: PHYSICIAN ASSISTANT
Payer: MEDICARE

## 2025-02-11 DIAGNOSIS — D49.59 URETERAL NEOPLASM: ICD-10-CM

## 2025-02-11 LAB
ANION GAP SERPL CALC-SCNC: 9 MMOL/L (ref 0–18)
BILIRUB UR QL STRIP.AUTO: NEGATIVE
BUN BLD-MCNC: 21 MG/DL (ref 9–23)
CALCIUM BLD-MCNC: 9.9 MG/DL (ref 8.7–10.6)
CHLORIDE SERPL-SCNC: 104 MMOL/L (ref 98–112)
CLARITY UR REFRACT.AUTO: CLEAR
CO2 SERPL-SCNC: 27 MMOL/L (ref 21–32)
CREAT BLD-MCNC: 1.39 MG/DL
EGFRCR SERPLBLD CKD-EPI 2021: 53 ML/MIN/1.73M2 (ref 60–?)
FASTING STATUS PATIENT QL REPORTED: NO
GLUCOSE BLD-MCNC: 110 MG/DL (ref 70–99)
GLUCOSE UR STRIP.AUTO-MCNC: NORMAL MG/DL
KETONES UR STRIP.AUTO-MCNC: NEGATIVE MG/DL
LEUKOCYTE ESTERASE UR QL STRIP.AUTO: 250
NITRITE UR QL STRIP.AUTO: NEGATIVE
OSMOLALITY SERPL CALC.SUM OF ELEC: 294 MOSM/KG (ref 275–295)
PH UR STRIP.AUTO: 5.5 [PH] (ref 5–8)
POTASSIUM SERPL-SCNC: 4.1 MMOL/L (ref 3.5–5.1)
SODIUM SERPL-SCNC: 140 MMOL/L (ref 136–145)
SP GR UR STRIP.AUTO: 1.01 (ref 1–1.03)
UROBILINOGEN UR STRIP.AUTO-MCNC: NORMAL MG/DL
WBC #/AREA URNS AUTO: >50 /HPF

## 2025-02-11 PROCEDURE — 80048 BASIC METABOLIC PNL TOTAL CA: CPT

## 2025-02-11 PROCEDURE — 87086 URINE CULTURE/COLONY COUNT: CPT

## 2025-02-11 PROCEDURE — 36415 COLL VENOUS BLD VENIPUNCTURE: CPT

## 2025-02-11 PROCEDURE — 81001 URINALYSIS AUTO W/SCOPE: CPT

## 2025-02-14 ENCOUNTER — NURSE ONLY (OUTPATIENT)
Age: 75
End: 2025-02-14
Attending: PHYSICIAN ASSISTANT
Payer: MEDICARE

## 2025-02-14 DIAGNOSIS — D49.59 URETERAL NEOPLASM: Primary | ICD-10-CM

## 2025-02-14 RX ORDER — LIDOCAINE HYDROCHLORIDE 20 MG/ML
10 JELLY TOPICAL ONCE
Status: COMPLETED | OUTPATIENT
Start: 2025-02-14 | End: 2025-02-14

## 2025-02-14 RX ORDER — LIDOCAINE HYDROCHLORIDE 20 MG/ML
10 JELLY TOPICAL ONCE
Status: CANCELLED
Start: 2025-02-14 | End: 2025-02-14

## 2025-02-14 RX ADMIN — LIDOCAINE HYDROCHLORIDE 10 ML: 20 JELLY TOPICAL at 08:31:00

## 2025-02-14 NOTE — PROGRESS NOTES
Pt here for BCG 6/6 . Pt denies any issues or concerns.      Ordering Provider: Martina  Order Exp: today     Pt tolerated infusion without difficulty or complaint. Reviewed next apt date/time: no more appointments to schedule at this time       Education Record  Learner:  Patient  Disease / Diagnosis: urothelial ca   Barriers / Limitations:  None  Method:  Discussion  General Topics:  Plan of care reviewed  Outcome:  Shows understanding      Patient here for last BCG - UA and culture reviewed and Ok'd to proceed by ordering provider. BCG instilled using sterile technique and patient tolerated well. Patient aware to hold urine for 2 hours and to take antibiotic in 4 hours. Patient left in stable condition.

## 2025-03-24 ENCOUNTER — OFFICE VISIT (OUTPATIENT)
Dept: INTERNAL MEDICINE CLINIC | Facility: CLINIC | Age: 75
End: 2025-03-24
Payer: MEDICARE

## 2025-03-24 ENCOUNTER — TELEPHONE (OUTPATIENT)
Dept: INTERNAL MEDICINE CLINIC | Facility: CLINIC | Age: 75
End: 2025-03-24

## 2025-03-24 VITALS
RESPIRATION RATE: 17 BRPM | TEMPERATURE: 98 F | BODY MASS INDEX: 35.76 KG/M2 | DIASTOLIC BLOOD PRESSURE: 66 MMHG | HEART RATE: 65 BPM | OXYGEN SATURATION: 95 % | WEIGHT: 247 LBS | HEIGHT: 69.5 IN | SYSTOLIC BLOOD PRESSURE: 112 MMHG

## 2025-03-24 DIAGNOSIS — I65.21 CAROTID STENOSIS, RIGHT: ICD-10-CM

## 2025-03-24 DIAGNOSIS — G47.33 OSA (OBSTRUCTIVE SLEEP APNEA): ICD-10-CM

## 2025-03-24 DIAGNOSIS — Z12.5 PROSTATE CANCER SCREENING: ICD-10-CM

## 2025-03-24 DIAGNOSIS — G47.33 OSA (OBSTRUCTIVE SLEEP APNEA): Primary | ICD-10-CM

## 2025-03-24 DIAGNOSIS — Z13.228 ENCOUNTER FOR SCREENING FOR METABOLIC DISORDER: ICD-10-CM

## 2025-03-24 DIAGNOSIS — M25.511 CHRONIC PAIN OF BOTH SHOULDERS: ICD-10-CM

## 2025-03-24 DIAGNOSIS — M25.512 CHRONIC PAIN OF BOTH SHOULDERS: ICD-10-CM

## 2025-03-24 DIAGNOSIS — E78.49 OTHER HYPERLIPIDEMIA: ICD-10-CM

## 2025-03-24 DIAGNOSIS — Z13.29 SCREENING FOR THYROID DISORDER: ICD-10-CM

## 2025-03-24 DIAGNOSIS — G89.29 CHRONIC PAIN OF BOTH SHOULDERS: ICD-10-CM

## 2025-03-24 DIAGNOSIS — Z00.00 ROUTINE GENERAL MEDICAL EXAMINATION AT A HEALTH CARE FACILITY: Primary | ICD-10-CM

## 2025-03-24 DIAGNOSIS — Z13.220 ENCOUNTER FOR SCREENING FOR LIPID DISORDER: ICD-10-CM

## 2025-03-24 DIAGNOSIS — Z13.0 SCREENING FOR BLOOD DISEASE: ICD-10-CM

## 2025-03-24 DIAGNOSIS — Z00.00 ROUTINE GENERAL MEDICAL EXAMINATION AT A HEALTH CARE FACILITY: ICD-10-CM

## 2025-03-24 DIAGNOSIS — I65.23 ATHEROSCLEROSIS OF BOTH CAROTID ARTERIES: ICD-10-CM

## 2025-03-24 DIAGNOSIS — C68.9 UROTHELIAL CANCER (HCC): ICD-10-CM

## 2025-03-24 NOTE — TELEPHONE ENCOUNTER
Patient called request labs prior to their annual physical.  Annual physical scheduled for 3/30/26 .   Please order labs. Patient preferred lab is [facility]  Patient informed request was sent to clinical team.  Patient informed to fast for labs.  No callback required.     Future Appointments   Date Time Provider Department Center   3/30/2026  8:20 AM Dwain Caballero MD [facility]

## 2025-03-25 NOTE — PROGRESS NOTES
Subjective:   Patient ID: Boo Fam is a 75 year old male.    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   HENT:  Negative for congestion.    Eyes:  Negative for visual disturbance.   Respiratory:  Negative for shortness of breath.    Cardiovascular:  Negative for chest pain.   Gastrointestinal:  Negative for abdominal pain.   Endocrine: Negative for polyuria.   Genitourinary:  Negative for dysuria.   Musculoskeletal:  Negative for arthralgias.   Skin:  Negative for rash.   Neurological:  Negative for headaches.   Psychiatric/Behavioral:  Negative for confusion.      Current Outpatient Medications   Medication Sig Dispense Refill    rosuvastatin 20 MG Oral Tab Take 1 tablet (20 mg total) by mouth nightly. 90 tablet 3    Omega-3 Fatty Acids (FISH OIL) 1200 MG Oral Cap       Multiple Vitamins-Minerals (MULTI-VITAMIN/MINERALS) Oral Tab Take 1 tablet by mouth daily.      cetirizine 10 MG Oral Tab Take 1 tablet (10 mg total) by mouth daily.      Coenzyme Q10 (COQ-10 OR) Take 1 capsule by mouth daily.        Cholecalciferol (VITAMIN D OR) Take 1 capsule by mouth daily.        psyllium (METAMUCIL SMOOTH TEXTURE) 28 % Oral Powd Pack Take 1 packet by mouth daily.      cephALEXin 500 MG Oral Cap Take 1 capsule (500 mg total) by mouth As Directed. Cephalexin 500mg as a one time dose 4 hours after each BCG Installation (Patient not taking: Reported on 3/24/2025) 6 capsule 0     Allergies:Allergies[1]    Objective:   Physical Exam  Constitutional:       Appearance: Normal appearance.   HENT:      Head:  Normocephalic and atraumatic.      Right Ear: Tympanic membrane normal.      Left Ear: Tympanic membrane normal.   Eyes:      Pupils: Pupils are equal, round, and reactive to light.   Cardiovascular:      Rate and Rhythm: Regular rhythm.      Heart sounds: No murmur heard.  Pulmonary:      Breath sounds: Normal breath sounds.   Abdominal:      Palpations: Abdomen is soft.   Musculoskeletal:         General: No swelling.      Cervical back: Neck supple.   Skin:     General: Skin is warm.   Neurological:      General: No focal deficit present.      Mental Status: He is alert.   Psychiatric:         Mood and Affect: Mood normal.         Assessment & Plan:   1. Routine general medical examination at a health care facility    2. Other hyperlipidemia -statin   3. Prostate cancer screening -psa with labs   4. Chronic pain of both shoulders -decreased rom, PT, call if not better   5. Urothelial cancer (HCC) -follows with urology at    6. Carotid stenosis, right -no symptoms, cont statin   7. Atherosclerosis of both carotid arteries -cont statin, labs to eval ldl   8. ADOLPH (obstructive sleep apnea) -cont cpap, wt loss   Fu in one year    Orders Placed This Encounter   Procedures    Comp Metabolic Panel (14) [E]    CBC W Differential W Platelet [E]    Lipid Panel [E]    PSA, Total (Screening) [E]       Meds This Visit:  Requested Prescriptions      No prescriptions requested or ordered in this encounter       Imaging & Referrals:  OP REFERRAL TO [facility]       [1]   Allergies  Allergen Reactions    Gramineae Pollens Runny nose, Coughing and OTHER (SEE COMMENTS)     Also trees    Grass Runny nose and Coughing     Also TREE    Pollen Runny nose and Coughing

## 2025-03-31 ENCOUNTER — LAB ENCOUNTER (OUTPATIENT)
Dept: LAB | Age: 75
End: 2025-03-31
Attending: INTERNAL MEDICINE
Payer: MEDICARE

## 2025-03-31 ENCOUNTER — TELEPHONE (OUTPATIENT)
Dept: FAMILY MEDICINE CLINIC | Facility: CLINIC | Age: 75
End: 2025-03-31

## 2025-03-31 DIAGNOSIS — E78.49 OTHER HYPERLIPIDEMIA: ICD-10-CM

## 2025-03-31 DIAGNOSIS — Z12.5 PROSTATE CANCER SCREENING: ICD-10-CM

## 2025-03-31 LAB
ALBUMIN SERPL-MCNC: 4.8 G/DL (ref 3.2–4.8)
ALBUMIN/GLOB SERPL: 1.5 {RATIO} (ref 1–2)
ALP LIVER SERPL-CCNC: 83 U/L
ALT SERPL-CCNC: 30 U/L
ANION GAP SERPL CALC-SCNC: 9 MMOL/L (ref 0–18)
AST SERPL-CCNC: 21 U/L (ref ?–34)
BASOPHILS # BLD AUTO: 0.03 X10(3) UL (ref 0–0.2)
BASOPHILS NFR BLD AUTO: 0.5 %
BILIRUB SERPL-MCNC: 0.5 MG/DL (ref 0.2–1.1)
BUN BLD-MCNC: 22 MG/DL (ref 9–23)
CALCIUM BLD-MCNC: 9.9 MG/DL (ref 8.7–10.6)
CHLORIDE SERPL-SCNC: 106 MMOL/L (ref 98–112)
CHOLEST SERPL-MCNC: 100 MG/DL (ref ?–200)
CO2 SERPL-SCNC: 28 MMOL/L (ref 21–32)
COMPLEXED PSA SERPL-MCNC: 0.52 NG/ML (ref ?–4)
CREAT BLD-MCNC: 1.3 MG/DL
EGFRCR SERPLBLD CKD-EPI 2021: 57 ML/MIN/1.73M2 (ref 60–?)
EOSINOPHIL # BLD AUTO: 1.94 X10(3) UL (ref 0–0.7)
EOSINOPHIL NFR BLD AUTO: 29.4 %
ERYTHROCYTE [DISTWIDTH] IN BLOOD BY AUTOMATED COUNT: 13.3 %
FASTING PATIENT LIPID ANSWER: YES
FASTING STATUS PATIENT QL REPORTED: YES
GLOBULIN PLAS-MCNC: 3.2 G/DL (ref 2–3.5)
GLUCOSE BLD-MCNC: 103 MG/DL (ref 70–99)
HCT VFR BLD AUTO: 46 %
HDLC SERPL-MCNC: 27 MG/DL (ref 40–59)
HGB BLD-MCNC: 15.4 G/DL
IMM GRANULOCYTES # BLD AUTO: 0.01 X10(3) UL (ref 0–1)
IMM GRANULOCYTES NFR BLD: 0.2 %
LDLC SERPL CALC-MCNC: 55 MG/DL (ref ?–100)
LYMPHOCYTES # BLD AUTO: 1.94 X10(3) UL (ref 1–4)
LYMPHOCYTES NFR BLD AUTO: 29.4 %
MCH RBC QN AUTO: 30.9 PG (ref 26–34)
MCHC RBC AUTO-ENTMCNC: 33.5 G/DL (ref 31–37)
MCV RBC AUTO: 92.2 FL
MONOCYTES # BLD AUTO: 0.44 X10(3) UL (ref 0.1–1)
MONOCYTES NFR BLD AUTO: 6.7 %
NEUTROPHILS # BLD AUTO: 2.24 X10 (3) UL (ref 1.5–7.7)
NEUTROPHILS # BLD AUTO: 2.24 X10(3) UL (ref 1.5–7.7)
NEUTROPHILS NFR BLD AUTO: 33.8 %
NONHDLC SERPL-MCNC: 73 MG/DL (ref ?–130)
OSMOLALITY SERPL CALC.SUM OF ELEC: 300 MOSM/KG (ref 275–295)
PLATELET # BLD AUTO: 203 10(3)UL (ref 150–450)
POTASSIUM SERPL-SCNC: 4.5 MMOL/L (ref 3.5–5.1)
PROT SERPL-MCNC: 8 G/DL (ref 5.7–8.2)
RBC # BLD AUTO: 4.99 X10(6)UL
SODIUM SERPL-SCNC: 143 MMOL/L (ref 136–145)
TRIGL SERPL-MCNC: 94 MG/DL (ref 30–149)
VLDLC SERPL CALC-MCNC: 14 MG/DL (ref 0–30)
WBC # BLD AUTO: 6.6 X10(3) UL (ref 4–11)

## 2025-03-31 PROCEDURE — 85025 COMPLETE CBC W/AUTO DIFF WBC: CPT

## 2025-03-31 PROCEDURE — 80053 COMPREHEN METABOLIC PANEL: CPT

## 2025-03-31 PROCEDURE — 80061 LIPID PANEL: CPT

## 2025-03-31 PROCEDURE — 36415 COLL VENOUS BLD VENIPUNCTURE: CPT

## 2025-03-31 NOTE — TELEPHONE ENCOUNTER
Dr Caballero - Patient would like to speak to you regarding his CBC results   Eosinophil count of 29.4    LOV     3/24/25    Routine General medical exam + 7      Last CBC  1/28/25    Eosinophil count of 2.9

## 2025-04-01 ENCOUNTER — TELEPHONE (OUTPATIENT)
Dept: INTERNAL MEDICINE CLINIC | Facility: CLINIC | Age: 75
End: 2025-04-01

## 2025-04-01 DIAGNOSIS — R79.89 ABNORMAL CBC: Primary | ICD-10-CM

## 2025-05-05 ENCOUNTER — TELEPHONE (OUTPATIENT)
Dept: INTERNAL MEDICINE CLINIC | Facility: CLINIC | Age: 75
End: 2025-05-05

## 2025-06-04 ENCOUNTER — TELEPHONE (OUTPATIENT)
Dept: INTERNAL MEDICINE CLINIC | Facility: CLINIC | Age: 75
End: 2025-06-04

## 2025-08-28 ENCOUNTER — TELEPHONE (OUTPATIENT)
Dept: INTERNAL MEDICINE CLINIC | Facility: CLINIC | Age: 75
End: 2025-08-28

## (undated) DIAGNOSIS — Z12.5 PROSTATE CANCER SCREENING: ICD-10-CM

## (undated) DIAGNOSIS — N40.1 BENIGN PROSTATIC HYPERPLASIA WITH URINARY RETENTION: ICD-10-CM

## (undated) DIAGNOSIS — D64.9 ANEMIA, UNSPECIFIED TYPE: ICD-10-CM

## (undated) DIAGNOSIS — Z00.00 ROUTINE GENERAL MEDICAL EXAMINATION AT A HEALTH CARE FACILITY: ICD-10-CM

## (undated) DIAGNOSIS — E88.81 INSULIN RESISTANCE: ICD-10-CM

## (undated) DIAGNOSIS — R33.8 BENIGN PROSTATIC HYPERPLASIA WITH URINARY RETENTION: ICD-10-CM

## (undated) DIAGNOSIS — E78.5 HYPERLIPIDEMIA, UNSPECIFIED HYPERLIPIDEMIA TYPE: Primary | ICD-10-CM

## (undated) DIAGNOSIS — Z83.3 FH: DIABETES MELLITUS: ICD-10-CM

## (undated) DEVICE — FLOSEAL HEMOSTATIC MATRIX, 5ML: Brand: FLOSEAL HEMOSTATIC MATRIX

## (undated) DEVICE — KENDALL SCD EXPRESS SLEEVES, KNEE LENGTH, MEDIUM: Brand: KENDALL SCD

## (undated) DEVICE — STERILE POLYISOPRENE POWDER-FREE SURGICAL GLOVES: Brand: PROTEXIS

## (undated) DEVICE — URETERAL ACCESS SHEATH SET: Brand: NAVIGATOR HD

## (undated) DEVICE — DRAPE MICROSCOPE NEURO PENTERO

## (undated) DEVICE — ENCORE® LATEX ACCLAIM SIZE 8, STERILE LATEX POWDER-FREE SURGICAL GLOVE: Brand: ENCORE

## (undated) DEVICE — NON-ADHERENT PAD PREPACK: Brand: TELFA

## (undated) DEVICE — GLOVE SURG TRIUMPH SZ 8

## (undated) DEVICE — SOL  .9 1000ML BTL

## (undated) DEVICE — ALCOHOL 70% 4 OZ

## (undated) DEVICE — BANDAID COVERLET 1X3

## (undated) DEVICE — SUTURE VICRYL 0 CT-2

## (undated) DEVICE — ENDOSCOPIC VALVE WITH ADAPTER.: Brand: SURSEAL® II

## (undated) DEVICE — FLEXOR, URETERAL ACCESS SHEATH WITH AQ, HYDROPHILIC COATING: Brand: FLEXOR

## (undated) DEVICE — LIGHT HANDLE

## (undated) DEVICE — Device

## (undated) DEVICE — SUTURE VICRYL 3-0 RB-1

## (undated) DEVICE — SOL H2O 3000ML IRRIG

## (undated) DEVICE — COVER SGL STRL LGHT HNDL BLU

## (undated) DEVICE — MARKER SKIN PREP RESIST STRL

## (undated) DEVICE — SOLO HYBRID WIRE 35 FLEX STR

## (undated) DEVICE — UNDYED BRAIDED (POLYGLACTIN 910), SYNTHETIC ABSORBABLE SUTURE: Brand: COATED VICRYL

## (undated) DEVICE — SOL H2O 1000ML BTL

## (undated) DEVICE — 4.0MM COARSE DIAMOND

## (undated) DEVICE — 3.0MM PRECISION ROUND

## (undated) DEVICE — Device: Brand: PLUMEPEN

## (undated) DEVICE — PLASTC TOOMEY SYRNG DISP

## (undated) DEVICE — URETEROSCOPIC BIOPSY FORCEPS: Brand: PIRANHA

## (undated) DEVICE — CONMED DISPOSABLE BRONCHIAL CYTOLOGY BRUSH, STRAIGHT HANDLE, 3 MM X 120 CM: Brand: CONMED

## (undated) DEVICE — MICRO COVER: Brand: UNBRANDED

## (undated) DEVICE — CYSTO PACK: Brand: MEDLINE INDUSTRIES, INC.

## (undated) DEVICE — CONTAINER SPEC STR 4OZ GRY LID

## (undated) DEVICE — GOWN,SIRUS,FABRIC-REINFORCED,X-LARGE: Brand: MEDLINE

## (undated) DEVICE — LAMINECTOMY CDS: Brand: MEDLINE INDUSTRIES, INC.

## (undated) DEVICE — 3M(TM) MEDIPORE(TM) +PAD SOFT CLOTH ADHESIVE WOUND DRESSING 3569: Brand: 3M™ MEDIPORE™

## (undated) DEVICE — ISOVUE 300 10X100ML VIAL

## (undated) DEVICE — ZIPWIRE GUIDEWIRE .035X150 STR

## (undated) DEVICE — GLOVE SURG SENSICARE SZ 6-1/2

## (undated) DEVICE — 5.0MM PRECISION ROUND

## (undated) DEVICE — SOL  .9 3000ML

## (undated) DEVICE — TIGERTAIL 6F FLXTIP 70CM

## (undated) DEVICE — SEAL Y BIOPSY PORT P6R SCOPE

## (undated) DEVICE — PAIN TRAY: Brand: MEDLINE INDUSTRIES, INC.

## (undated) DEVICE — STERILE SYNTHETIC POLYISOPRENE POWDER-FREE SURGICAL GLOVES WITH HYDROGEL COATING: Brand: PROTEXIS

## (undated) DEVICE — SPECIMEN TRAP LUKI

## (undated) DEVICE — Device: Brand: INTELLICART™

## (undated) DEVICE — SCD SLEEVE KNEE HI BLEND

## (undated) DEVICE — REMOVER DURAPREP 3M

## (undated) DEVICE — 3M(TM) MEDIPORE(TM) +PAD SOFT CLOTH ADHESIVE WOUND DRESSING 3566: Brand: 3M™ MEDIPORE™

## (undated) DEVICE — GAUZE SPONGES,12 PLY: Brand: CURITY

## (undated) DEVICE — ADHESIVE MASTISOL 2/3CC VL

## (undated) DEVICE — GLOVE SURG SENSICARE SZ 7-1/2

## (undated) DEVICE — REM POLYHESIVE ADULT PATIENT RETURN ELECTRODE: Brand: VALLEYLAB

## (undated) DEVICE — CAUTERY CORD DISP E0503

## (undated) DEVICE — TRANSPOSAL ULTRAFLEX DUO/QUAD ULTRA CART MANIFOLD

## (undated) DEVICE — TIGERTAIL 5F FLXTIP 70CM

## (undated) DEVICE — SOLUTION ANSEP 70% ISOPRPNL

## (undated) DEVICE — NEEDLE SPINAL 22X5 405148

## (undated) DEVICE — CYSTO CDS-LF: Brand: MEDLINE INDUSTRIES, INC.

## (undated) DEVICE — SUTURE VICRYL 2-0 CT-2

## (undated) NOTE — LETTER
06/01/20        Pablo Bhakta 36      Dear Amber Lyon records indicate that you have outstanding lab work and or testing that was ordered for you and has not yet been completed:  Orders Placed This Encounter

## (undated) NOTE — LETTER
01/02/18        Marion Leaver  Livia 36      Dear Rebecca Ayers records indicate that you have outstanding lab work and or testing that was ordered for you and has not yet been completed:          Lipid Panel [E]      He

## (undated) NOTE — Clinical Note
Procedure Date:11/1/21  D/c Date:11/2/21  Procedure:P RIGHT LUMBAR 3-LUMBAR 4 MICRODISCECTOMY.  RIGHT LUMBAR 4- LUMBAR 5 AND LUMBAR 5 - SACRAL 1 MICRO FORAMINOTOMIES  Surgeon: Olena Ann

## (undated) NOTE — LETTER
03/17/22        William Bhakta 36      Dear Alma Babin records indicate that you have outstanding lab work and or testing that was ordered for you and has not yet been completed:  Orders Placed This Encounter      CBC W Differential W Platelet [E]      Comp Metabolic Panel (14) [E]      Iron And Tibc [E]      Ferritin [E]      PSA (Screening) [E]      Lipid Panel [E]    To provide you with the best possible care, please complete these orders at your earliest convenience. If you have recently completed these orders please disregard this letter. If you have any questions please call the office at Dept: [phone].      Thank you,       Muriel Shone, MD

## (undated) NOTE — LETTER
10/18/19        RE: Christa Bragg     : 1/3/1950    Dear Dr. Temitope Bryant,    This letter is to inform you that your patient is being scheduled for surgery with Dr. Yehuda Ramsey on 10/28/19 at [facility]. We have asked the patient to contact your offic

## (undated) NOTE — LETTER
Marleen Montana 89  [address], [address] [facility]  Authorization for Surgical Operation and Procedure     Date:___________                                                                                                         Time:__________ 4.   Should the need arise during my operation or immediate post-operative period, I also consent to the administration of blood and/or blood products.   Further, I understand that despite careful testing and screening of blood or blood products by jason 8.   I recognize that in the event my procedure results in extended X-Ray/fluoroscopy time, I may develop a skin reaction. 9.  If I have a Do Not Attempt Resuscitation (DNAR) order in place, that status will be suspended while in the operating room, proc 1. IJassi agree to be cared for by an anesthesiologist, who is specially trained to monitor me and give me medicine to put me to sleep or keep me comfortable during my procedure    I understand that my anesthesiologist is not an employee 5. My doctor has explained to me other choices available to me for my care (alternatives).   6. Pregnant Patients (“epidural”):  I understand that the risks of having an epidural (medicine given into my back to help control pain during labor), include itchi

## (undated) NOTE — LETTER
04/02/21        Bel Bhakta 36      Dear Hemal Johnson records indicate that you have outstanding lab work and or testing that was ordered for you and has not yet been completed:  Orders Placed This Encounter

## (undated) NOTE — MR AVS SNAPSHOT
After Visit Summary   8/28/2020    Santana Gilbert    MRN: [id number]           Visit Information     Date & Time  8/28/2020  8:00 AM Provider  [address] [place] [address],[place] [address] Dept.  Phone  [phone]      Your 9/4/2020 8:00 AM [facility] now offers Video Visits through [address] for adult and pediatric patients.   Video Visits are available Monday - Friday for many common conditions such as allergies, cold Monday – Friday  10:00 am – 10:00 pm   Saturday – Sunday  10:00 am – 4:00 pm     [address]   Monday – Friday  4:00 pm – 10:00 pm   Saturday – Sunday  10:00 am – 4:00 pm  [facility]  Emergency Medicine Providers  Conditions needing urgent attention, but

## (undated) NOTE — LETTER
02/28/18        Meggan Arnold, Dr. Jamison Jain [address]      Dear Tricia Bailey,    [facility] records indicate that you have outstanding lab work and or testing that was ordered for you and has not yet been completed:          Lipid Panel [E]

## (undated) NOTE — LETTER
10/14/21    RE: Christa Bragg     : 1/3/1950    Dear Dr. Mary Randall,    This letter is to inform you that your patient is being scheduled for surgery with Dr. Ambrocio Bustamante on 11/3/21 at [facility]. We have asked the patient to contact your office t